# Patient Record
Sex: MALE | Race: WHITE | NOT HISPANIC OR LATINO | Employment: FULL TIME | URBAN - METROPOLITAN AREA
[De-identification: names, ages, dates, MRNs, and addresses within clinical notes are randomized per-mention and may not be internally consistent; named-entity substitution may affect disease eponyms.]

---

## 2017-02-02 ENCOUNTER — OFFICE VISIT (OUTPATIENT)
Dept: FAMILY MEDICINE CLINIC | Facility: CLINIC | Age: 62
End: 2017-02-02

## 2017-02-02 ENCOUNTER — APPOINTMENT (OUTPATIENT)
Dept: LAB | Facility: HOSPITAL | Age: 62
End: 2017-02-02

## 2017-02-02 VITALS
SYSTOLIC BLOOD PRESSURE: 120 MMHG | WEIGHT: 217 LBS | HEIGHT: 72 IN | BODY MASS INDEX: 29.39 KG/M2 | HEART RATE: 73 BPM | TEMPERATURE: 97.5 F | DIASTOLIC BLOOD PRESSURE: 80 MMHG | OXYGEN SATURATION: 95 %

## 2017-02-02 DIAGNOSIS — B35.6 TINEA CRURIS: ICD-10-CM

## 2017-02-02 DIAGNOSIS — Z00.00 ROUTINE GENERAL MEDICAL EXAMINATION AT A HEALTH CARE FACILITY: Primary | ICD-10-CM

## 2017-02-02 DIAGNOSIS — K21.00 GASTROESOPHAGEAL REFLUX DISEASE WITH ESOPHAGITIS: ICD-10-CM

## 2017-02-02 DIAGNOSIS — I10 ESSENTIAL HYPERTENSION: ICD-10-CM

## 2017-02-02 DIAGNOSIS — E78.2 MIXED HYPERLIPIDEMIA: ICD-10-CM

## 2017-02-02 DIAGNOSIS — I10 ESSENTIAL HYPERTENSION, BENIGN: ICD-10-CM

## 2017-02-02 DIAGNOSIS — K58.2 IRRITABLE BOWEL SYNDROME WITH BOTH CONSTIPATION AND DIARRHEA: ICD-10-CM

## 2017-02-02 DIAGNOSIS — M70.21 OLECRANON BURSITIS OF RIGHT ELBOW: ICD-10-CM

## 2017-02-02 LAB
ALBUMIN SERPL-MCNC: 4.7 G/DL (ref 3.2–4.8)
ALBUMIN/GLOB SERPL: 1.6 G/DL (ref 1.5–2.5)
ALP SERPL-CCNC: 123 U/L (ref 25–100)
ALT SERPL W P-5'-P-CCNC: 99 U/L (ref 7–40)
ANION GAP SERPL CALCULATED.3IONS-SCNC: 9 MMOL/L (ref 3–11)
ARTICHOKE IGE QN: 154 MG/DL (ref 0–130)
AST SERPL-CCNC: 55 U/L (ref 0–33)
BASOPHILS # BLD AUTO: 0.06 10*3/MM3 (ref 0–0.2)
BASOPHILS NFR BLD AUTO: 0.8 % (ref 0–1)
BILIRUB SERPL-MCNC: 0.6 MG/DL (ref 0.3–1.2)
BUN BLD-MCNC: 16 MG/DL (ref 9–23)
BUN/CREAT SERPL: 17.8 (ref 7–25)
CALCIUM SPEC-SCNC: 10.7 MG/DL (ref 8.7–10.4)
CHLORIDE SERPL-SCNC: 100 MMOL/L (ref 99–109)
CHOLEST SERPL-MCNC: 239 MG/DL (ref 0–200)
CO2 SERPL-SCNC: 31 MMOL/L (ref 20–31)
CREAT BLD-MCNC: 0.9 MG/DL (ref 0.6–1.3)
DEPRECATED RDW RBC AUTO: 41.9 FL (ref 37–54)
EOSINOPHIL # BLD AUTO: 0.27 10*3/MM3 (ref 0.1–0.3)
EOSINOPHIL NFR BLD AUTO: 3.6 % (ref 0–3)
ERYTHROCYTE [DISTWIDTH] IN BLOOD BY AUTOMATED COUNT: 12.5 % (ref 11.3–14.5)
GFR SERPL CREATININE-BSD FRML MDRD: 85 ML/MIN/1.73
GLOBULIN UR ELPH-MCNC: 3 GM/DL
GLUCOSE BLD-MCNC: 101 MG/DL (ref 70–100)
HCT VFR BLD AUTO: 46.5 % (ref 38.9–50.9)
HCV AB SER DONR QL: NORMAL
HDLC SERPL-MCNC: 43 MG/DL (ref 40–60)
HGB BLD-MCNC: 15.3 G/DL (ref 13.1–17.5)
IMM GRANULOCYTES # BLD: 0.01 10*3/MM3 (ref 0–0.03)
IMM GRANULOCYTES NFR BLD: 0.1 % (ref 0–0.6)
LYMPHOCYTES # BLD AUTO: 1.59 10*3/MM3 (ref 0.6–4.8)
LYMPHOCYTES NFR BLD AUTO: 21.2 % (ref 24–44)
MCH RBC QN AUTO: 30.3 PG (ref 27–31)
MCHC RBC AUTO-ENTMCNC: 32.9 G/DL (ref 32–36)
MCV RBC AUTO: 92.1 FL (ref 80–99)
MONOCYTES # BLD AUTO: 0.66 10*3/MM3 (ref 0–1)
MONOCYTES NFR BLD AUTO: 8.8 % (ref 0–12)
NEUTROPHILS # BLD AUTO: 4.92 10*3/MM3 (ref 1.5–8.3)
NEUTROPHILS NFR BLD AUTO: 65.5 % (ref 41–71)
PLATELET # BLD AUTO: 235 10*3/MM3 (ref 150–450)
PMV BLD AUTO: 10.5 FL (ref 6–12)
POTASSIUM BLD-SCNC: 4.4 MMOL/L (ref 3.5–5.5)
PROT SERPL-MCNC: 7.7 G/DL (ref 5.7–8.2)
PSA SERPL-MCNC: 3.7 NG/ML (ref 0–4)
RBC # BLD AUTO: 5.05 10*6/MM3 (ref 4.2–5.76)
SODIUM BLD-SCNC: 140 MMOL/L (ref 132–146)
T4 SERPL-MCNC: 6.6 MCG/DL (ref 4.7–11.4)
TRIGL SERPL-MCNC: 299 MG/DL (ref 0–150)
TSH SERPL DL<=0.05 MIU/L-ACNC: 0.71 MIU/ML (ref 0.35–5.35)
WBC NRBC COR # BLD: 7.51 10*3/MM3 (ref 3.5–10.8)

## 2017-02-02 PROCEDURE — 84436 ASSAY OF TOTAL THYROXINE: CPT | Performed by: FAMILY MEDICINE

## 2017-02-02 PROCEDURE — 80061 LIPID PANEL: CPT | Performed by: FAMILY MEDICINE

## 2017-02-02 PROCEDURE — 80053 COMPREHEN METABOLIC PANEL: CPT | Performed by: FAMILY MEDICINE

## 2017-02-02 PROCEDURE — G0103 PSA SCREENING: HCPCS | Performed by: FAMILY MEDICINE

## 2017-02-02 PROCEDURE — 99396 PREV VISIT EST AGE 40-64: CPT | Performed by: FAMILY MEDICINE

## 2017-02-02 PROCEDURE — 93000 ELECTROCARDIOGRAM COMPLETE: CPT | Performed by: FAMILY MEDICINE

## 2017-02-02 PROCEDURE — 84443 ASSAY THYROID STIM HORMONE: CPT | Performed by: FAMILY MEDICINE

## 2017-02-02 PROCEDURE — 85025 COMPLETE CBC W/AUTO DIFF WBC: CPT | Performed by: FAMILY MEDICINE

## 2017-02-02 PROCEDURE — 36415 COLL VENOUS BLD VENIPUNCTURE: CPT | Performed by: FAMILY MEDICINE

## 2017-02-02 PROCEDURE — 86803 HEPATITIS C AB TEST: CPT | Performed by: FAMILY MEDICINE

## 2017-02-02 RX ORDER — SUCRALFATE 1 G/1
1 TABLET ORAL 4 TIMES DAILY
COMMUNITY
End: 2017-02-02 | Stop reason: SDUPTHER

## 2017-02-02 RX ORDER — SUCRALFATE 1 G/1
1 TABLET ORAL 4 TIMES DAILY
Qty: 120 TABLET | Refills: 11 | Status: SHIPPED | OUTPATIENT
Start: 2017-02-02 | End: 2018-05-04 | Stop reason: SDUPTHER

## 2017-02-02 RX ORDER — DICYCLOMINE HCL 20 MG
20 TABLET ORAL EVERY 6 HOURS
Qty: 60 TABLET | Refills: 11 | Status: SHIPPED | OUTPATIENT
Start: 2017-02-02 | End: 2018-05-04 | Stop reason: SDUPTHER

## 2017-02-02 RX ORDER — PRAVASTATIN SODIUM 40 MG
40 TABLET ORAL DAILY
Qty: 90 TABLET | Refills: 3 | Status: SHIPPED | OUTPATIENT
Start: 2017-02-02 | End: 2017-02-12

## 2017-02-02 RX ORDER — HYDROCHLOROTHIAZIDE 25 MG/1
25 TABLET ORAL DAILY
COMMUNITY
End: 2017-02-02 | Stop reason: SDUPTHER

## 2017-02-02 RX ORDER — HYDROCHLOROTHIAZIDE 25 MG/1
25 TABLET ORAL DAILY
Qty: 90 TABLET | Refills: 3 | Status: SHIPPED | OUTPATIENT
Start: 2017-02-02 | End: 2018-06-28 | Stop reason: SDUPTHER

## 2017-02-02 RX ORDER — LOSARTAN POTASSIUM 100 MG/1
100 TABLET ORAL DAILY
Qty: 90 TABLET | Refills: 3 | Status: SHIPPED | OUTPATIENT
Start: 2017-02-02 | End: 2018-05-04 | Stop reason: SDUPTHER

## 2017-02-02 RX ORDER — PRAVASTATIN SODIUM 40 MG
1 TABLET ORAL DAILY
COMMUNITY
Start: 2016-10-27 | End: 2017-02-02 | Stop reason: SDUPTHER

## 2017-02-02 RX ORDER — MELOXICAM 15 MG/1
15 TABLET ORAL DAILY
Qty: 30 TABLET | Refills: 1 | Status: SHIPPED | OUTPATIENT
Start: 2017-02-02 | End: 2018-06-28 | Stop reason: SDUPTHER

## 2017-02-02 RX ORDER — CLOTRIMAZOLE AND BETAMETHASONE DIPROPIONATE 10; .64 MG/G; MG/G
CREAM TOPICAL 3 TIMES DAILY
Qty: 45 G | Refills: 2 | Status: SHIPPED | OUTPATIENT
Start: 2017-02-02 | End: 2020-05-14

## 2017-02-02 RX ORDER — DICYCLOMINE HCL 20 MG
20 TABLET ORAL EVERY 6 HOURS
COMMUNITY
End: 2017-02-02 | Stop reason: SDUPTHER

## 2017-02-02 NOTE — PROGRESS NOTES
Conrad Santiago is a 61 y.o. male    HPI Comments: Patient presents today for annual physical examination.  Chronic medical problems include hypertension, hyperlipidemia, reflux esophagitis, irritable bowel syndrome and relatively recent detached retina.  He also had an abscess on his left lower back recently incised and drained.  Subsequent to that he developed rash in his groin that is now spread to his axilla.  It is quite pruritic.  He also complains of pain at the right elbow with swelling.  He has a previous history of olecranon bursitis in this elbow.  He also complains of dysuria and urinary frequency that has been intermittent for the past several days.  He denies any urethral discharge, fever or chills.  From a health maintenance standpoint he has not had a flu vaccine or tetanus booster in unknown period of time and he's never had the shingles vaccine.  His last colonoscopy was in 2010.  He is fasting for his lab studies today.  He needs all of his medicines refilled also.    Rash   Pertinent negatives include no cough or shortness of breath.   Joint Swelling   Associated symptoms include arthralgias, joint swelling and a rash. Pertinent negatives include no chest pain or coughing.   Urinary Tract Infection    Associated symptoms include frequency.       The following portions of the patient's history were reviewed and updated as appropriate: allergies, current medications, past social history and problem list    Review of Systems   Constitutional: Negative.    HENT: Negative.    Eyes: Positive for visual disturbance.   Respiratory: Negative.  Negative for cough, shortness of breath and wheezing.    Cardiovascular: Negative.  Negative for chest pain and palpitations.   Gastrointestinal: Negative.    Endocrine: Negative.    Genitourinary: Positive for dysuria and frequency.   Musculoskeletal: Positive for arthralgias and joint swelling.   Skin: Positive for rash.   Allergic/Immunologic: Negative.     Neurological: Negative.    Hematological: Negative.    Psychiatric/Behavioral: Negative.    All other systems reviewed and are negative.      Objective     Vitals:    02/02/17 0846   BP: 120/80   Pulse: 73   Temp: 97.5 °F (36.4 °C)   SpO2: 95%       Physical Exam   Constitutional: He is oriented to person, place, and time. He appears well-developed and well-nourished.   HENT:   Head: Normocephalic and atraumatic.   Right Ear: External ear normal.   Left Ear: External ear normal.   Nose: Nose normal.   Mouth/Throat: Oropharynx is clear and moist.   Eyes: Conjunctivae and EOM are normal. Pupils are equal, round, and reactive to light.   Neck: Normal range of motion. Neck supple. No thyromegaly present.   Cardiovascular: Normal rate, regular rhythm, normal heart sounds and intact distal pulses.    No murmur heard.  Pulmonary/Chest: Effort normal and breath sounds normal.   Abdominal: Soft. Bowel sounds are normal. He exhibits no mass. There is no tenderness.   Genitourinary: Rectum normal, prostate normal and penis normal.   Musculoskeletal: Normal range of motion. He exhibits no edema.        Right elbow: He exhibits swelling. He exhibits normal range of motion, no effusion, no deformity and no laceration. Tenderness found. Olecranon process tenderness noted.   Tenderness with slight swelling at the olecranon bursa right elbow.   Neurological: He is alert and oriented to person, place, and time. He has normal reflexes. No cranial nerve deficit.   Skin: Skin is warm and dry. Rash noted.   Erythematous rash confluent bilateral groin regions extending onto his scrotum and also noted in both axillae.   Psychiatric: He has a normal mood and affect. His behavior is normal.       ECG 12 Lead  Date/Time: 2/2/2017 9:55 AM  Performed by: JASON SUAZO  Authorized by: JASON SUAZO   Comparison: not compared with previous ECG   Rhythm: sinus rhythm  Rate: normal  Conduction: conduction normal  ST  Segments: ST segments normal  T Waves: T waves normal  QRS axis: normal  Other: no other findings  Clinical impression: normal ECG            Assessment/Plan   Problem List Items Addressed This Visit        Cardiovascular and Mediastinum    Essential hypertension, benign    Relevant Medications    losartan (COZAAR) 100 MG tablet    hydrochlorothiazide (HYDRODIURIL) 25 MG tablet    Other Relevant Orders    Comprehensive Metabolic Panel    TSH    T4    Hyperlipidemia    Relevant Medications    pravastatin (PRAVACHOL) 40 MG tablet    Other Relevant Orders    Lipid Panel       Digestive    Esophageal reflux    Relevant Medications    dicyclomine (BENTYL) 20 MG tablet    sucralfate (CARAFATE) 1 G tablet    Other Relevant Orders    CBC & Differential    CBC Auto Differential    IBS (irritable bowel syndrome)    Relevant Medications    dicyclomine (BENTYL) 20 MG tablet      Other Visit Diagnoses     Routine general medical examination at a health care facility    -  Primary    Relevant Orders    CBC & Differential    Comprehensive Metabolic Panel    Lipid Panel    Hepatitis C Antibody    TSH    T4    PSA Screen    CBC Auto Differential    Tinea cruris        Relevant Medications    clotrimazole-betamethasone (LOTRISONE) 1-0.05 % cream    Olecranon bursitis of right elbow        Relevant Medications    meloxicam (MOBIC) 15 MG tablet    Other Relevant Orders    CBC & Differential    CBC Auto Differential    Essential hypertension        Relevant Medications    losartan (COZAAR) 100 MG tablet    hydrochlorothiazide (HYDRODIURIL) 25 MG tablet        Tdap and flu vaccine today.    Patient is counseled during today's visit regarding preventative health measures to include use of seatbelts, medication safety, healthy diet and regular exercise.

## 2017-02-12 RX ORDER — ROSUVASTATIN CALCIUM 20 MG/1
20 TABLET, COATED ORAL DAILY
Qty: 30 TABLET | Refills: 5 | Status: SHIPPED | OUTPATIENT
Start: 2017-02-12 | End: 2018-05-04 | Stop reason: ALTCHOICE

## 2018-04-27 DIAGNOSIS — I10 ESSENTIAL HYPERTENSION, BENIGN: ICD-10-CM

## 2018-04-27 DIAGNOSIS — I10 ESSENTIAL HYPERTENSION: ICD-10-CM

## 2018-05-04 ENCOUNTER — TELEPHONE (OUTPATIENT)
Dept: FAMILY MEDICINE CLINIC | Facility: CLINIC | Age: 63
End: 2018-05-04

## 2018-05-04 DIAGNOSIS — K58.2 IRRITABLE BOWEL SYNDROME WITH BOTH CONSTIPATION AND DIARRHEA: ICD-10-CM

## 2018-05-04 DIAGNOSIS — I10 ESSENTIAL HYPERTENSION, BENIGN: ICD-10-CM

## 2018-05-04 DIAGNOSIS — K21.00 GASTROESOPHAGEAL REFLUX DISEASE WITH ESOPHAGITIS: ICD-10-CM

## 2018-05-04 DIAGNOSIS — I10 ESSENTIAL HYPERTENSION: ICD-10-CM

## 2018-05-04 RX ORDER — PRAVASTATIN SODIUM 40 MG
40 TABLET ORAL DAILY
Qty: 30 TABLET | Refills: 0 | Status: SHIPPED | OUTPATIENT
Start: 2018-05-04 | End: 2018-06-28 | Stop reason: SDUPTHER

## 2018-05-04 RX ORDER — SUCRALFATE 1 G/1
1 TABLET ORAL 4 TIMES DAILY
Qty: 120 TABLET | Refills: 0 | Status: SHIPPED | OUTPATIENT
Start: 2018-05-04 | End: 2018-06-28 | Stop reason: SDUPTHER

## 2018-05-04 RX ORDER — DICYCLOMINE HCL 20 MG
20 TABLET ORAL EVERY 6 HOURS
Qty: 60 TABLET | Refills: 0 | Status: SHIPPED | OUTPATIENT
Start: 2018-05-04 | End: 2018-06-28 | Stop reason: SDUPTHER

## 2018-05-04 RX ORDER — LOSARTAN POTASSIUM 100 MG/1
100 TABLET ORAL DAILY
Qty: 30 TABLET | Refills: 0 | Status: SHIPPED | OUTPATIENT
Start: 2018-05-04 | End: 2018-06-28 | Stop reason: SDUPTHER

## 2018-05-04 NOTE — TELEPHONE ENCOUNTER
----- Message from Chava Santiago sent at 4/19/2018 11:59 AM EDT -----  Regarding: Prescription Question  Contact: [phone]  Dr. Bangura,       The Pharmacy is saying that I need all of my prescriptions redone (Carafate, Losartan, Dycyclomine and Prevastatin) .   please make 30 day intervals. Thank you    Chava Santiago

## 2018-05-04 NOTE — TELEPHONE ENCOUNTER
----- Message from Chava Santiago sent at 4/19/2018 11:59 AM EDT -----  Regarding: Prescription Question  Contact: [phone]  Dr. Bangura,       The Pharmacy is saying that I need all of my prescriptions redone (Carafate, Losartan, Dycyclomine and Prevastatin) .   please make 30 day intervals. Thank you    Chava Santiago

## 2018-05-04 NOTE — TELEPHONE ENCOUNTER
----- Message from Mis Mcfarland sent at 5/4/2018  8:27 AM EDT -----  Contact: PTS WIFE, CHEN LAW  PTS WIFE SAID THERE HAS BEEN NO RESPONSE TO REFILL REQUEST FROM [facility]    LOSARTAN, PREVASTATIN, DICYCLOMINE, KARAFATE

## 2018-06-28 DIAGNOSIS — M70.21 OLECRANON BURSITIS OF RIGHT ELBOW: ICD-10-CM

## 2018-06-28 DIAGNOSIS — K58.2 IRRITABLE BOWEL SYNDROME WITH BOTH CONSTIPATION AND DIARRHEA: ICD-10-CM

## 2018-06-28 DIAGNOSIS — K21.00 GASTROESOPHAGEAL REFLUX DISEASE WITH ESOPHAGITIS: ICD-10-CM

## 2018-06-28 DIAGNOSIS — I10 ESSENTIAL HYPERTENSION: ICD-10-CM

## 2018-06-28 DIAGNOSIS — I10 ESSENTIAL HYPERTENSION, BENIGN: ICD-10-CM

## 2018-06-28 RX ORDER — LOSARTAN POTASSIUM 100 MG/1
100 TABLET ORAL DAILY
Qty: 30 TABLET | Refills: 2 | Status: SHIPPED | OUTPATIENT
Start: 2018-06-28 | End: 2018-11-05 | Stop reason: SDUPTHER

## 2018-06-28 RX ORDER — SUCRALFATE 1 G/1
1 TABLET ORAL 4 TIMES DAILY
Qty: 120 TABLET | Refills: 2 | Status: SHIPPED | OUTPATIENT
Start: 2018-06-28 | End: 2018-12-04

## 2018-06-28 RX ORDER — HYDROCHLOROTHIAZIDE 25 MG/1
25 TABLET ORAL DAILY
Qty: 30 TABLET | Refills: 2 | Status: SHIPPED | OUTPATIENT
Start: 2018-06-28 | End: 2018-12-04 | Stop reason: SDUPTHER

## 2018-06-28 RX ORDER — PRAVASTATIN SODIUM 40 MG
40 TABLET ORAL DAILY
Qty: 30 TABLET | Refills: 2 | Status: SHIPPED | OUTPATIENT
Start: 2018-06-28 | End: 2018-11-05 | Stop reason: SDUPTHER

## 2018-06-28 RX ORDER — DICYCLOMINE HCL 20 MG
20 TABLET ORAL EVERY 6 HOURS
Qty: 60 TABLET | Refills: 0 | Status: SHIPPED | OUTPATIENT
Start: 2018-06-28 | End: 2018-10-24 | Stop reason: SDUPTHER

## 2018-06-28 RX ORDER — MELOXICAM 15 MG/1
15 TABLET ORAL DAILY
Qty: 30 TABLET | Refills: 2 | Status: SHIPPED | OUTPATIENT
Start: 2018-06-28 | End: 2018-12-04 | Stop reason: SDUPTHER

## 2018-07-26 ENCOUNTER — LAB (OUTPATIENT)
Dept: LAB | Facility: HOSPITAL | Age: 63
End: 2018-07-26

## 2018-07-26 ENCOUNTER — HOSPITAL ENCOUNTER (OUTPATIENT)
Dept: GENERAL RADIOLOGY | Facility: HOSPITAL | Age: 63
Discharge: HOME | End: 2018-07-26
Admitting: FAMILY MEDICINE

## 2018-07-26 ENCOUNTER — OFFICE VISIT (OUTPATIENT)
Dept: FAMILY MEDICINE CLINIC | Facility: CLINIC | Age: 63
End: 2018-07-26

## 2018-07-26 VITALS
HEART RATE: 52 BPM | TEMPERATURE: 97.4 F | WEIGHT: 210 LBS | OXYGEN SATURATION: 98 % | DIASTOLIC BLOOD PRESSURE: 78 MMHG | HEIGHT: 72 IN | SYSTOLIC BLOOD PRESSURE: 122 MMHG | BODY MASS INDEX: 28.44 KG/M2

## 2018-07-26 DIAGNOSIS — M25.562 ACUTE PAIN OF LEFT KNEE: ICD-10-CM

## 2018-07-26 DIAGNOSIS — I10 ESSENTIAL HYPERTENSION, BENIGN: ICD-10-CM

## 2018-07-26 DIAGNOSIS — E78.2 MIXED HYPERLIPIDEMIA: ICD-10-CM

## 2018-07-26 DIAGNOSIS — Z00.00 ROUTINE GENERAL MEDICAL EXAMINATION AT A HEALTH CARE FACILITY: Primary | ICD-10-CM

## 2018-07-26 DIAGNOSIS — Z00.00 ROUTINE GENERAL MEDICAL EXAMINATION AT A HEALTH CARE FACILITY: ICD-10-CM

## 2018-07-26 DIAGNOSIS — Z12.5 PROSTATE CANCER SCREENING: ICD-10-CM

## 2018-07-26 DIAGNOSIS — R00.1 SINUS BRADYCARDIA: ICD-10-CM

## 2018-07-26 LAB
ALBUMIN SERPL-MCNC: 4.31 G/DL (ref 3.2–4.8)
ALBUMIN/GLOB SERPL: 1.7 G/DL (ref 1.5–2.5)
ALP SERPL-CCNC: 97 U/L (ref 25–100)
ALT SERPL W P-5'-P-CCNC: 28 U/L (ref 7–40)
ANION GAP SERPL CALCULATED.3IONS-SCNC: 7 MMOL/L (ref 3–11)
ARTICHOKE IGE QN: 138 MG/DL (ref 0–130)
AST SERPL-CCNC: 22 U/L (ref 0–33)
BILIRUB SERPL-MCNC: 0.6 MG/DL (ref 0.3–1.2)
BUN BLD-MCNC: 20 MG/DL (ref 9–23)
BUN/CREAT SERPL: 21.3 (ref 7–25)
CALCIUM SPEC-SCNC: 9.8 MG/DL (ref 8.7–10.4)
CHLORIDE SERPL-SCNC: 105 MMOL/L (ref 99–109)
CHOLEST SERPL-MCNC: 193 MG/DL (ref 0–200)
CO2 SERPL-SCNC: 30 MMOL/L (ref 20–31)
CREAT BLD-MCNC: 0.94 MG/DL (ref 0.6–1.3)
DEPRECATED RDW RBC AUTO: 42.7 FL (ref 37–54)
ERYTHROCYTE [DISTWIDTH] IN BLOOD BY AUTOMATED COUNT: 13 % (ref 11.3–14.5)
GFR SERPL CREATININE-BSD FRML MDRD: 81 ML/MIN/1.73
GLOBULIN UR ELPH-MCNC: 2.5 GM/DL
GLUCOSE BLD-MCNC: 99 MG/DL (ref 70–100)
HCT VFR BLD AUTO: 45.8 % (ref 38.9–50.9)
HDLC SERPL-MCNC: 42 MG/DL (ref 40–60)
HGB BLD-MCNC: 15.1 G/DL (ref 13.1–17.5)
MCH RBC QN AUTO: 29.8 PG (ref 27–31)
MCHC RBC AUTO-ENTMCNC: 33 G/DL (ref 32–36)
MCV RBC AUTO: 90.5 FL (ref 80–99)
PLATELET # BLD AUTO: 246 10*3/MM3 (ref 150–450)
PMV BLD AUTO: 10.6 FL (ref 6–12)
POTASSIUM BLD-SCNC: 4.8 MMOL/L (ref 3.5–5.5)
PROT SERPL-MCNC: 6.8 G/DL (ref 5.7–8.2)
PSA SERPL-MCNC: 1.54 NG/ML (ref 0–4)
RBC # BLD AUTO: 5.06 10*6/MM3 (ref 4.2–5.76)
SODIUM BLD-SCNC: 142 MMOL/L (ref 132–146)
T4 FREE SERPL-MCNC: 1.03 NG/DL (ref 0.89–1.76)
TRIGL SERPL-MCNC: 219 MG/DL (ref 0–150)
TSH SERPL DL<=0.05 MIU/L-ACNC: 1.03 MIU/ML (ref 0.35–5.35)
WBC NRBC COR # BLD: 5.64 10*3/MM3 (ref 3.5–10.8)

## 2018-07-26 PROCEDURE — 80061 LIPID PANEL: CPT

## 2018-07-26 PROCEDURE — 73560 X-RAY EXAM OF KNEE 1 OR 2: CPT

## 2018-07-26 PROCEDURE — 80053 COMPREHEN METABOLIC PANEL: CPT

## 2018-07-26 PROCEDURE — G0103 PSA SCREENING: HCPCS

## 2018-07-26 PROCEDURE — 93000 ELECTROCARDIOGRAM COMPLETE: CPT | Performed by: FAMILY MEDICINE

## 2018-07-26 PROCEDURE — 36415 COLL VENOUS BLD VENIPUNCTURE: CPT

## 2018-07-26 PROCEDURE — 85027 COMPLETE CBC AUTOMATED: CPT

## 2018-07-26 PROCEDURE — 84439 ASSAY OF FREE THYROXINE: CPT

## 2018-07-26 PROCEDURE — 84443 ASSAY THYROID STIM HORMONE: CPT

## 2018-07-26 PROCEDURE — 99396 PREV VISIT EST AGE 40-64: CPT | Performed by: FAMILY MEDICINE

## 2018-07-26 NOTE — PROGRESS NOTES
Conrad Santiago is a 62 y.o. male    Patient presents today for annual physical examination.  Is been 18 months since he has had a wellness visit.  Health maintenance issues are addressed and updated.  Chronic medical problems include hypertension, hyperlipidemia, GERD and irritable bowel syndrome.  His only acute complaint is that of left knee pain that started approximately 1 month ago.  There was no specific injury but he developed some swelling and pain medially.  The swelling has subsided but he continues to have discomfort and also reports occasional locking of the knee occurs.  He does not feel any catching and there is been no giving way sensation.  No prior history of knee problems.        The following portions of the patient's history were reviewed and updated as appropriate: allergies, current medications, past social history and problem list    Review of Systems   Constitutional: Negative.  Negative for chills, fatigue, fever and unexpected weight change.   HENT: Negative.    Eyes: Negative.    Respiratory: Negative.  Negative for cough, chest tightness, shortness of breath and wheezing.    Cardiovascular: Negative.  Negative for chest pain, palpitations and leg swelling.   Gastrointestinal: Negative.  Negative for abdominal pain, nausea and vomiting.   Endocrine: Negative.  Negative for polydipsia, polyphagia and polyuria.   Genitourinary: Negative.  Negative for dysuria, frequency and urgency.   Musculoskeletal: Negative.  Negative for arthralgias, back pain and myalgias.   Skin: Negative.  Negative for color change and rash.   Allergic/Immunologic: Negative.    Neurological: Negative.  Negative for dizziness, syncope, weakness and headaches.   Hematological: Negative.  Negative for adenopathy. Does not bruise/bleed easily.   Psychiatric/Behavioral: Negative.    All other systems reviewed and are negative.      Objective     Vitals:    07/26/18 0852   BP: 122/78   Pulse: 52   Temp: 97.4 °F  (36.3 °C)   SpO2: 98%       Physical Exam   Constitutional: He is oriented to person, place, and time. He appears well-developed and well-nourished.   HENT:   Head: Normocephalic and atraumatic.   Right Ear: External ear normal.   Left Ear: External ear normal.   Nose: Nose normal.   Mouth/Throat: Oropharynx is clear and moist.   Eyes: Pupils are equal, round, and reactive to light. Conjunctivae and EOM are normal.   Neck: Normal range of motion. Neck supple. No JVD present. No thyromegaly present.   Cardiovascular: Normal rate, regular rhythm, normal heart sounds, intact distal pulses and normal pulses.    No murmur heard.  Pulmonary/Chest: Effort normal and breath sounds normal. No respiratory distress.   Abdominal: Soft. Bowel sounds are normal. He exhibits no mass. There is no hepatosplenomegaly. There is no tenderness.   Genitourinary: Rectum normal, prostate normal and penis normal.   Musculoskeletal: Normal range of motion. He exhibits no edema.   Lymphadenopathy:     He has no cervical adenopathy.   Neurological: He is alert and oriented to person, place, and time. He has normal reflexes. No cranial nerve deficit.   Skin: Skin is warm and dry. No rash noted.   Psychiatric: He has a normal mood and affect. His behavior is normal.   Nursing note and vitals reviewed.      ECG 12 Lead  Date/Time: 7/26/2018 10:30 AM  Performed by: JASON SUAZO  Authorized by: JASON SUAZO   Comparison: not compared with previous ECG   Rhythm: sinus bradycardia  Rate: bradycardic  ST Segments: ST segments normal  T Waves: T waves normal  QRS axis: normal  Other: no other findings  Clinical impression: normal ECG  Comments: Sinus bradycardia          Assessment/Plan   Problem List Items Addressed This Visit        Cardiovascular and Mediastinum    Essential hypertension, benign    Relevant Orders    Comprehensive Metabolic Panel    TSH    T4, Free    Hyperlipidemia    Relevant Orders    Comprehensive  Metabolic Panel    Lipid Panel      Other Visit Diagnoses     Routine general medical examination at a health care facility    -  Primary    Relevant Orders    CBC (No Diff)    Comprehensive Metabolic Panel    Lipid Panel    Acute pain of left knee        Relevant Orders    XR Knee 1 or 2 View Left    Ambulatory Referral to Orthopedic Surgery    Prostate cancer screening        Relevant Orders    PSA Screen        Patient is counseled during today's visit regarding preventative health measures to include use of seatbelts, medication safety, healthy diet and regular exercise.

## 2018-08-08 ENCOUNTER — OFFICE VISIT (OUTPATIENT)
Dept: ORTHOPEDIC SURGERY | Facility: CLINIC | Age: 63
End: 2018-08-08

## 2018-08-08 VITALS — HEIGHT: 69 IN | BODY MASS INDEX: 30.99 KG/M2 | WEIGHT: 209.22 LBS | OXYGEN SATURATION: 97 % | HEART RATE: 62 BPM

## 2018-08-08 DIAGNOSIS — M17.12 ARTHRITIS OF LEFT KNEE: Primary | ICD-10-CM

## 2018-08-08 DIAGNOSIS — M17.12 PRIMARY OSTEOARTHRITIS OF LEFT KNEE: ICD-10-CM

## 2018-08-08 PROCEDURE — 99204 OFFICE O/P NEW MOD 45 MIN: CPT | Performed by: ORTHOPAEDIC SURGERY

## 2018-08-08 PROCEDURE — 20610 DRAIN/INJ JOINT/BURSA W/O US: CPT | Performed by: ORTHOPAEDIC SURGERY

## 2018-08-08 RX ORDER — BETAMETHASONE SODIUM PHOSPHATE AND BETAMETHASONE ACETATE 3; 3 MG/ML; MG/ML
6 INJECTION, SUSPENSION INTRA-ARTICULAR; INTRALESIONAL; INTRAMUSCULAR; SOFT TISSUE
Status: COMPLETED | OUTPATIENT
Start: 2018-08-08 | End: 2018-08-08

## 2018-08-08 RX ORDER — BUPIVACAINE HYDROCHLORIDE 2.5 MG/ML
4 INJECTION, SOLUTION INFILTRATION; PERINEURAL
Status: COMPLETED | OUTPATIENT
Start: 2018-08-08 | End: 2018-08-08

## 2018-08-08 RX ORDER — LIDOCAINE HYDROCHLORIDE 10 MG/ML
4 INJECTION, SOLUTION INFILTRATION; PERINEURAL
Status: COMPLETED | OUTPATIENT
Start: 2018-08-08 | End: 2018-08-08

## 2018-08-08 RX ADMIN — LIDOCAINE HYDROCHLORIDE 4 ML: 10 INJECTION, SOLUTION INFILTRATION; PERINEURAL at 09:46

## 2018-08-08 RX ADMIN — BETAMETHASONE SODIUM PHOSPHATE AND BETAMETHASONE ACETATE 6 MG: 3; 3 INJECTION, SUSPENSION INTRA-ARTICULAR; INTRALESIONAL; INTRAMUSCULAR; SOFT TISSUE at 09:46

## 2018-08-08 RX ADMIN — BUPIVACAINE HYDROCHLORIDE 4 ML: 2.5 INJECTION, SOLUTION INFILTRATION; PERINEURAL at 09:46

## 2018-08-08 NOTE — PROGRESS NOTES
Procedure   Large Joint Arthrocentesis  Date/Time: 8/8/2018 9:46 AM  Consent given by: patient  Site marked: site marked  Timeout: Immediately prior to procedure a time out was called to verify the correct patient, procedure, equipment, support staff and site/side marked as required   Supporting Documentation  Indications: pain   Procedure Details  Location: knee - L knee  Preparation: Patient was prepped and draped in the usual sterile fashion  Needle size: 22 G  Approach: anterolateral  Medications administered: 4 mL bupivacaine 0.25 %; 4 mL lidocaine 1 %; 6 mg betamethasone acetate-betamethasone sodium phosphate 6 (3-3) MG/ML  Patient tolerance: patient tolerated the procedure well with no immediate complications

## 2018-08-29 ENCOUNTER — OFFICE VISIT (OUTPATIENT)
Dept: ORTHOPEDIC SURGERY | Facility: CLINIC | Age: 63
End: 2018-08-29

## 2018-08-29 VITALS — WEIGHT: 209.22 LBS | HEIGHT: 69 IN | OXYGEN SATURATION: 98 % | HEART RATE: 63 BPM | BODY MASS INDEX: 30.99 KG/M2

## 2018-08-29 DIAGNOSIS — M17.12 PRIMARY OSTEOARTHRITIS OF LEFT KNEE: Primary | ICD-10-CM

## 2018-08-29 PROCEDURE — 99212 OFFICE O/P EST SF 10 MIN: CPT | Performed by: ORTHOPAEDIC SURGERY

## 2018-08-29 NOTE — PROGRESS NOTES
[facility] [place] [facility] Note    Subjective     Chief Complaint   Patient presents with   • Follow-up     3 week follow up -- Arthritis of left knee - injection given 08/08/18        HPI      Chava HERNANDES Law is a 62 y.o. male.  He is doing better with the left knee.  The cortisone shot helped tremendously.  He takes Mobic.  His pain is 2 out of 10 and aching at times.        Past Medical History:   Diagnosis Date   • Acute bronchitis    • Acute pharyngitis    • Conjunctivitis    • Hyperlipidemia    • Hypertension    • IBS (irritable bowel syndrome)    • Lumbago    • Rhinitis       Past Surgical History:   Procedure Laterality Date   • EYE SURGERY Bilateral     multiple on both eyes   • HERNIA REPAIR Right     x2   • KNEE SURGERY Left     arthroscopy with meniscal repair- 10 years ago      Family History   Problem Relation Age of Onset   • Adopted: Yes     Social History     Social History   • Marital status:      Spouse name: N/A   • Number of children: N/A   • Years of education: N/A     Occupational History   • Not on file.     Social History Main Topics   • Smoking status: Former Smoker   • Smokeless tobacco: Never Used   • Alcohol use Yes      Comment: occ   • Drug use: No   • Sexual activity: Not on file     Other Topics Concern   • Not on file     Social History Narrative   • No narrative on file      Current Outpatient Prescriptions on File Prior to Visit   Medication Sig Dispense Refill   • clotrimazole-betamethasone (LOTRISONE) 1-0.05 % cream Apply  topically 3 (Three) Times a Day. 45 g 2   • dicyclomine (BENTYL) 20 MG tablet Take 1 tablet by mouth Every 6 (Six) Hours. 60 tablet 0   • hydrochlorothiazide (HYDRODIURIL) 25 MG tablet Take 1 tablet by mouth Daily. 30 tablet 2   • losartan (COZAAR) 100 MG tablet Take 1 tablet by mouth Daily. 30 tablet 2   • meloxicam (MOBIC) 15 MG tablet Take 1 tablet by mouth Daily. 30 tablet 2   • pravastatin (PRAVACHOL) 40 MG tablet Take 1 tablet by mouth Daily.  "30 tablet 2   • sucralfate (CARAFATE) 1 g tablet Take 1 tablet by mouth 4 (Four) Times a Day. 120 tablet 2     No current facility-administered medications on file prior to visit.       No Known Allergies     The following portions of the patient's history were reviewed and updated as appropriate: allergies, current medications, past family history, past medical history, past social history, past surgical history and problem list.    Review of Systems   Constitutional: Negative.    HENT: Negative.    Eyes: Negative.    Respiratory: Negative.    Cardiovascular: Negative.    Gastrointestinal: Negative.    Endocrine: Negative.    Genitourinary: Negative.    Musculoskeletal: Positive for arthralgias and joint swelling.   Skin: Negative.    Allergic/Immunologic: Negative.    Neurological: Negative.    Hematological: Negative.    Psychiatric/Behavioral: Negative.         Objective      Physical Exam  Pulse 63   Ht 175.3 cm (69.02\")   Wt 94.9 kg (209 lb 3.5 oz)   SpO2 98%   BMI 30.88 kg/m²     Body mass index is 30.88 kg/m².        GENERAL APPEARANCE: awake, alert & oriented x 3, in no acute distress and well developed, well nourished  PSYCH: normal mood and affect    Ortho Exam  Peripheral Vascular:    Upper Extremity:   Inspection:  Left--no cyanotic nail beds Right--no cyanotic nail beds   Bilateral:  Pink nail beds with brisk capillary refill   Palpation:  Bilateral radial pulse normal    Musculoskeletal:  Global Assessment:  Overall assessment of Lower Extremity Muscle Strength and Tone:  Left quadriceps--5/5   Left hamstrings--5/5       Left tibialis anterior--5/5  Left gastroc-soleus--5/5  Left EHL --5/5    Lower Extremity:  Knee/Patella:  No digital clubbing or cyanosis.    Examination of left knee reveals:  Normal deep tendon reflexes, coordination, strength, tone, sensation.  No known fractures or deformities.    Inspection and Palpation:  Left knee:  Tenderness:  Over the medial joint line and moderate " severity  Effusion:  none  Crepitus:  Positive  Pulses:  2+  Ecchymosis:  None  Warmth:  None     ROM:  Right:  Extension:5    Flexion:120  Left:  Extension:5     Flexion:120    Instability:    Left:  Lachman Test:  Negative, Varus stress test negative, Valgus stress test negative    Deformities/Malalignments/Discrepancies:    Left:  Genu Varum   Right:  No deformity    Functional Testing:  Jacquelyn's test:  Negative  Patella grind test:  Positive  Q-angle:  normal    Imaging/Studies  Imaging Results (last 7 days)     ** No results found for the last 168 hours. **          Assessment/Plan        ICD-10-CM ICD-9-CM   1. Primary osteoarthritis of left knee M17.12 715.16     He is doing better after the cortisone injection and will follow-up as needed.  Medical Decision Making  Management Options : over-the-counter medicine      Raúl Bhatti MD  08/29/18  9:19 AM         EMR Dragon/Transcription disclaimer:  Much of this encounter note is an electronic transcription of spoken language to printed text. Electronic transcription of spoken language may permit erroneous, or at times, nonsensical words or phrases to be inadvertently transcribed. Although I have reviewed the note for such errors, some may still exist.

## 2018-10-24 DIAGNOSIS — K58.2 IRRITABLE BOWEL SYNDROME WITH BOTH CONSTIPATION AND DIARRHEA: ICD-10-CM

## 2018-11-05 DIAGNOSIS — I10 ESSENTIAL HYPERTENSION, BENIGN: ICD-10-CM

## 2018-11-05 DIAGNOSIS — I10 ESSENTIAL HYPERTENSION: ICD-10-CM

## 2018-11-05 RX ORDER — PRAVASTATIN SODIUM 40 MG
TABLET ORAL
Qty: 30 TABLET | Refills: 3 | Status: SHIPPED | OUTPATIENT
Start: 2018-11-05 | End: 2018-12-04 | Stop reason: SDUPTHER

## 2018-11-05 RX ORDER — DICYCLOMINE HCL 20 MG
TABLET ORAL
Qty: 60 TABLET | Refills: 0 | Status: SHIPPED | OUTPATIENT
Start: 2018-11-05 | End: 2018-12-04 | Stop reason: SDUPTHER

## 2018-11-05 RX ORDER — LOSARTAN POTASSIUM 100 MG/1
TABLET ORAL
Qty: 30 TABLET | Refills: 3 | Status: SHIPPED | OUTPATIENT
Start: 2018-11-05 | End: 2018-12-04 | Stop reason: SDUPTHER

## 2018-12-04 ENCOUNTER — OFFICE VISIT (OUTPATIENT)
Dept: FAMILY MEDICINE CLINIC | Facility: CLINIC | Age: 63
End: 2018-12-04

## 2018-12-04 VITALS
WEIGHT: 213 LBS | SYSTOLIC BLOOD PRESSURE: 140 MMHG | HEART RATE: 55 BPM | TEMPERATURE: 98.6 F | OXYGEN SATURATION: 98 % | HEIGHT: 69 IN | DIASTOLIC BLOOD PRESSURE: 82 MMHG | BODY MASS INDEX: 31.55 KG/M2

## 2018-12-04 DIAGNOSIS — M70.21 OLECRANON BURSITIS OF RIGHT ELBOW: ICD-10-CM

## 2018-12-04 DIAGNOSIS — K21.00 GASTROESOPHAGEAL REFLUX DISEASE WITH ESOPHAGITIS: ICD-10-CM

## 2018-12-04 DIAGNOSIS — E78.2 MIXED HYPERLIPIDEMIA: ICD-10-CM

## 2018-12-04 DIAGNOSIS — N39.0 URINARY TRACT INFECTION WITH HEMATURIA, SITE UNSPECIFIED: ICD-10-CM

## 2018-12-04 DIAGNOSIS — Z87.19 HISTORY OF ESOPHAGEAL STRICTURE: ICD-10-CM

## 2018-12-04 DIAGNOSIS — I10 ESSENTIAL HYPERTENSION, BENIGN: ICD-10-CM

## 2018-12-04 DIAGNOSIS — K58.2 IRRITABLE BOWEL SYNDROME WITH BOTH CONSTIPATION AND DIARRHEA: ICD-10-CM

## 2018-12-04 DIAGNOSIS — R31.9 URINARY TRACT INFECTION WITH HEMATURIA, SITE UNSPECIFIED: ICD-10-CM

## 2018-12-04 DIAGNOSIS — R13.10 DYSPHAGIA, UNSPECIFIED TYPE: Primary | ICD-10-CM

## 2018-12-04 LAB
BILIRUB BLD-MCNC: NEGATIVE MG/DL
CLARITY, POC: ABNORMAL
COLOR UR: ABNORMAL
GLUCOSE UR STRIP-MCNC: NEGATIVE MG/DL
KETONES UR QL: NEGATIVE
LEUKOCYTE EST, POC: ABNORMAL
NITRITE UR-MCNC: POSITIVE MG/ML
PH UR: 6 [PH] (ref 5–8)
PROT UR STRIP-MCNC: ABNORMAL MG/DL
RBC # UR STRIP: ABNORMAL /UL
SP GR UR: 1.02 (ref 1–1.03)
UROBILINOGEN UR QL: NORMAL

## 2018-12-04 PROCEDURE — 81003 URINALYSIS AUTO W/O SCOPE: CPT | Performed by: FAMILY MEDICINE

## 2018-12-04 PROCEDURE — 99214 OFFICE O/P EST MOD 30 MIN: CPT | Performed by: FAMILY MEDICINE

## 2018-12-04 RX ORDER — HYDROCHLOROTHIAZIDE 25 MG/1
25 TABLET ORAL DAILY
Qty: 90 TABLET | Refills: 3 | Status: SHIPPED | OUTPATIENT
Start: 2018-12-04 | End: 2020-01-20 | Stop reason: SDUPTHER

## 2018-12-04 RX ORDER — LOSARTAN POTASSIUM 100 MG/1
100 TABLET ORAL DAILY
Qty: 90 TABLET | Refills: 3 | Status: SHIPPED | OUTPATIENT
Start: 2018-12-04 | End: 2020-01-20

## 2018-12-04 RX ORDER — CIPROFLOXACIN 500 MG/1
500 TABLET, FILM COATED ORAL 2 TIMES DAILY
Qty: 20 TABLET | Refills: 0 | Status: SHIPPED | OUTPATIENT
Start: 2018-12-04 | End: 2018-12-27 | Stop reason: SDUPTHER

## 2018-12-04 RX ORDER — MELOXICAM 15 MG/1
15 TABLET ORAL DAILY
Qty: 90 TABLET | Refills: 3 | Status: SHIPPED | OUTPATIENT
Start: 2018-12-04 | End: 2020-01-20 | Stop reason: SDUPTHER

## 2018-12-04 RX ORDER — PRAVASTATIN SODIUM 40 MG
40 TABLET ORAL DAILY
Qty: 90 TABLET | Refills: 3 | Status: SHIPPED | OUTPATIENT
Start: 2018-12-04 | End: 2020-01-20 | Stop reason: SDUPTHER

## 2018-12-04 RX ORDER — DICYCLOMINE HCL 20 MG
TABLET ORAL
Qty: 60 TABLET | Refills: 11 | Status: SHIPPED | OUTPATIENT
Start: 2018-12-04 | End: 2020-01-20 | Stop reason: SDUPTHER

## 2018-12-04 RX ORDER — SUCRALFATE ORAL 1 G/10ML
1 SUSPENSION ORAL 4 TIMES DAILY
Qty: 420 ML | Refills: 11 | Status: SHIPPED | OUTPATIENT
Start: 2018-12-04 | End: 2020-05-14 | Stop reason: SDUPTHER

## 2018-12-04 NOTE — PROGRESS NOTES
Conrad Santiago is a 63 y.o. male    Chief Complaint    Dysphagia  Esophageal reflux  History of esophageal stricture  Hypertension  Malodorous urine      History of Present Illness   Patient presents today with multiple complaints.  Primary complaint is difficulty swallowing.  Has a history of esophageal reflux and previous esophageal stricture requiring dilatation.  He reports he is having trouble swallowing pills and swallowing solid food.  He has been taking Carafate tablets but would like the liquid.  Also follow-up regarding hypertension.  Needs multiple refills.  Also complains today of malodorous urine but denies dysuria or hematuria.  No history of kidney stones, urinary tract infection or prostatitis.  Office urinalysis is positive for leukocytes, blood and nitrite positive.      The following portions of the patient's history were reviewed and updated as appropriate: allergies, current medications, past social history and problem list    Review of Systems   Constitutional: Negative for chills, fatigue, fever and unexpected weight change.   HENT: Positive for trouble swallowing. Negative for voice change.    Respiratory: Negative for cough, chest tightness, shortness of breath and wheezing.    Cardiovascular: Negative for chest pain, palpitations and leg swelling.   Gastrointestinal: Negative for abdominal pain, nausea and vomiting.   Endocrine: Negative for polydipsia, polyphagia and polyuria.   Genitourinary: Positive for difficulty urinating. Negative for dysuria, frequency, hematuria and urgency.   Musculoskeletal: Positive for arthralgias and myalgias. Negative for back pain.   Skin: Negative for color change and rash.   Neurological: Negative for dizziness, syncope, weakness and headaches.   Hematological: Negative for adenopathy. Does not bruise/bleed easily.   Psychiatric/Behavioral: Negative.        Objective     Vitals:    12/04/18 1325   BP: 140/82   Pulse: 55   Temp: 98.6 °F (37 °C)    SpO2: 98%       Physical Exam   Constitutional: He is oriented to person, place, and time. He appears well-developed and well-nourished.   HENT:   Head: Normocephalic.   Mouth/Throat: Oropharynx is clear and moist.   Eyes: Conjunctivae are normal. Pupils are equal, round, and reactive to light.   Neck: Neck supple. No JVD present. No thyromegaly present.   Cardiovascular: Normal rate, regular rhythm, normal heart sounds, intact distal pulses and normal pulses.   No murmur heard.  Pulmonary/Chest: Effort normal and breath sounds normal. No respiratory distress.   Abdominal: Soft. Bowel sounds are normal. He exhibits no mass. There is no hepatosplenomegaly. There is tenderness. There is no rebound and no guarding.   Musculoskeletal: He exhibits no edema or tenderness.   Lymphadenopathy:     He has no cervical adenopathy.   Neurological: He is alert and oriented to person, place, and time.   Skin: Skin is warm and dry. No rash noted.   Psychiatric: He has a normal mood and affect. His behavior is normal.   Nursing note and vitals reviewed.      Assessment/Plan   Problem List Items Addressed This Visit        Cardiovascular and Mediastinum    Essential hypertension, benign    Relevant Medications    hydrochlorothiazide (HYDRODIURIL) 25 MG tablet    losartan (COZAAR) 100 MG tablet    Hyperlipidemia    Relevant Medications    pravastatin (PRAVACHOL) 40 MG tablet       Digestive    Esophageal reflux    Relevant Medications    dicyclomine (BENTYL) 20 MG tablet    sucralfate (CARAFATE) 1 GM/10ML suspension    Other Relevant Orders    Ambulatory Referral to Gastroenterology    IBS (irritable bowel syndrome)    Relevant Medications    dicyclomine (BENTYL) 20 MG tablet      Other Visit Diagnoses     Dysphagia, unspecified type    -  Primary    Relevant Orders    Ambulatory Referral to Gastroenterology    History of esophageal stricture        Relevant Medications    sucralfate (CARAFATE) 1 GM/10ML suspension    Other Relevant  Orders    Ambulatory Referral to Gastroenterology    Olecranon bursitis of right elbow        Relevant Medications    meloxicam (MOBIC) 15 MG tablet    Urinary tract infection with hematuria, site unspecified        Relevant Medications    ciprofloxacin (CIPRO) 500 MG tablet    Other Relevant Orders    POC Urinalysis Dipstick, Automated (Completed)

## 2018-12-05 ENCOUNTER — OFFICE VISIT (OUTPATIENT)
Dept: ORTHOPEDIC SURGERY | Facility: CLINIC | Age: 63
End: 2018-12-05

## 2018-12-05 VITALS — HEIGHT: 69 IN | BODY MASS INDEX: 31.35 KG/M2 | WEIGHT: 211.64 LBS | HEART RATE: 65 BPM | OXYGEN SATURATION: 98 %

## 2018-12-05 DIAGNOSIS — M17.12 PRIMARY OSTEOARTHRITIS OF LEFT KNEE: Primary | ICD-10-CM

## 2018-12-05 PROCEDURE — 20610 DRAIN/INJ JOINT/BURSA W/O US: CPT | Performed by: ORTHOPAEDIC SURGERY

## 2018-12-05 RX ORDER — BETAMETHASONE SODIUM PHOSPHATE AND BETAMETHASONE ACETATE 3; 3 MG/ML; MG/ML
6 INJECTION, SUSPENSION INTRA-ARTICULAR; INTRALESIONAL; INTRAMUSCULAR; SOFT TISSUE
Status: COMPLETED | OUTPATIENT
Start: 2018-12-05 | End: 2018-12-05

## 2018-12-05 RX ORDER — BUPIVACAINE HYDROCHLORIDE 2.5 MG/ML
4 INJECTION, SOLUTION INFILTRATION; PERINEURAL
Status: COMPLETED | OUTPATIENT
Start: 2018-12-05 | End: 2018-12-05

## 2018-12-05 RX ORDER — LIDOCAINE HYDROCHLORIDE 10 MG/ML
4 INJECTION, SOLUTION INFILTRATION; PERINEURAL
Status: COMPLETED | OUTPATIENT
Start: 2018-12-05 | End: 2018-12-05

## 2018-12-05 RX ADMIN — BETAMETHASONE SODIUM PHOSPHATE AND BETAMETHASONE ACETATE 6 MG: 3; 3 INJECTION, SUSPENSION INTRA-ARTICULAR; INTRALESIONAL; INTRAMUSCULAR; SOFT TISSUE at 08:46

## 2018-12-05 RX ADMIN — LIDOCAINE HYDROCHLORIDE 4 ML: 10 INJECTION, SOLUTION INFILTRATION; PERINEURAL at 08:46

## 2018-12-05 RX ADMIN — BUPIVACAINE HYDROCHLORIDE 4 ML: 2.5 INJECTION, SOLUTION INFILTRATION; PERINEURAL at 08:46

## 2018-12-05 NOTE — PROGRESS NOTES
[facility] [place] [facility] Note    Subjective     Chief Complaint   Patient presents with   • Left Knee - Follow-up     3 Month f/u  Primary osteoarthritis of left knee        HPI      Chava HERNANDES Law is a 63 y.o. male.  He complains of left knee pain.  His last cortisone injection helped for over 6 weeks.  His pain is 7 out of 10.        Past Medical History:   Diagnosis Date   • Acute bronchitis    • Acute pharyngitis    • Conjunctivitis    • Hyperlipidemia    • Hypertension    • IBS (irritable bowel syndrome)    • Lumbago    • Rhinitis       Past Surgical History:   Procedure Laterality Date   • EYE SURGERY Bilateral     multiple on both eyes   • HERNIA REPAIR Right     x2   • KNEE SURGERY Left     arthroscopy with meniscal repair- 10 years ago      Family History   Adopted: Yes     Social History     Socioeconomic History   • Marital status:      Spouse name: Not on file   • Number of children: Not on file   • Years of education: Not on file   • Highest education level: Not on file   Social Needs   • Financial resource strain: Not on file   • Food insecurity - worry: Not on file   • Food insecurity - inability: Not on file   • Transportation needs - medical: Not on file   • Transportation needs - non-medical: Not on file   Occupational History   • Not on file   Tobacco Use   • Smoking status: Former Smoker   • Smokeless tobacco: Never Used   Substance and Sexual Activity   • Alcohol use: Yes     Comment: occ   • Drug use: No   • Sexual activity: Not on file   Other Topics Concern   • Not on file   Social History Narrative   • Not on file      Current Outpatient Medications on File Prior to Visit   Medication Sig Dispense Refill   • ciprofloxacin (CIPRO) 500 MG tablet Take 1 tablet by mouth 2 (Two) Times a Day. 20 tablet 0   • clotrimazole-betamethasone (LOTRISONE) 1-0.05 % cream Apply  topically 3 (Three) Times a Day. 45 g 2   • dicyclomine (BENTYL) 20 MG tablet 1 tablet 4 times a day as needed for  "abdominal pain or diarrhea 60 tablet 11   • hydrochlorothiazide (HYDRODIURIL) 25 MG tablet Take 1 tablet by mouth Daily. 90 tablet 3   • losartan (COZAAR) 100 MG tablet Take 1 tablet by mouth Daily. 90 tablet 3   • meloxicam (MOBIC) 15 MG tablet Take 1 tablet by mouth Daily. 90 tablet 3   • pravastatin (PRAVACHOL) 40 MG tablet Take 1 tablet by mouth Daily. 90 tablet 3   • sucralfate (CARAFATE) 1 GM/10ML suspension Take 10 mL by mouth 4 (Four) Times a Day. 420 mL 11     No current facility-administered medications on file prior to visit.       No Known Allergies     The following portions of the patient's history were reviewed and updated as appropriate: allergies, current medications, past family history, past medical history, past social history, past surgical history and problem list.    Review of Systems   Constitutional: Negative.    HENT: Negative.    Eyes: Negative.    Respiratory: Negative.    Cardiovascular: Negative.    Gastrointestinal: Negative.    Endocrine: Negative.    Genitourinary: Negative.    Musculoskeletal: Positive for arthralgias.   Skin: Negative.    Allergic/Immunologic: Negative.    Neurological: Negative.    Hematological: Negative.    Psychiatric/Behavioral: Negative.         Objective      Physical Exam  Pulse 65   Ht 175.3 cm (69.02\")   Wt 96 kg (211 lb 10.3 oz)   SpO2 98%   BMI 31.24 kg/m²     Body mass index is 31.24 kg/m².        GENERAL APPEARANCE: awake, alert & oriented x 3, in no acute distress and well developed, well nourished  PSYCH: normal mood and affect      Ortho Exam  Peripheral Vascular:    Upper Extremity:   Inspection:  Left--no cyanotic nail beds Right--no cyanotic nail beds   Bilateral:  Pink nail beds with brisk capillary refill   Palpation:  Bilateral radial pulse normal    Musculoskeletal:  Global Assessment:  Overall assessment of Lower Extremity Muscle Strength and Tone:  Left quadriceps--5/5   Left hamstrings--5/5       Left tibialis anterior--5/5  Left " gastroc-soleus--5/5  Left EHL --5/5    Lower Extremity:  Knee/Patella:  No digital clubbing or cyanosis.    Examination of left knee reveals:  Normal deep tendon reflexes, coordination, strength, tone, sensation.  No known fractures or deformities.    Inspection and Palpation:  Left knee:  Tenderness:  Over the medial joint line and moderate severity  Effusion:  none  Crepitus:  Positive  Pulses:  2+  Ecchymosis:  None  Warmth:  None     ROM:  Right:  Extension:5    Flexion:120  Left:  Extension:5     Flexion:120    Instability:    Left:  Lachman Test:  Negative, Varus stress test negative, Valgus stress test negative    Deformities/Malalignments/Discrepancies:    Left:  Genu Varum   Right:  No deformity    Functional Testing:  Jacquelyn's test:  Negative  Patella grind test:  Positive  Q-angle:  normal    Imaging/Studies  Imaging Results (last 7 days)     ** No results found for the last 168 hours. **          Assessment/Plan      No diagnosis found.    Orders Placed This Encounter   Procedures   • Large Joint Arthrocentesis: L knee    I injected his left knee with cortisone.  He'll follow-up as needed.    Medical Decision Making  Management Options : prescription/IM medicine    Raúl Bhatti MD  12/05/18  8:51 AM         EMR Dragon/Transcription disclaimer:  Much of this encounter note is an electronic transcription of spoken language to printed text. Electronic transcription of spoken language may permit erroneous, or at times, nonsensical words or phrases to be inadvertently transcribed. Although I have reviewed the note for such errors, some may still exist.

## 2018-12-05 NOTE — PROGRESS NOTES
Procedure   Large Joint Arthrocentesis: L knee  Date/Time: 12/5/2018 8:46 AM  Consent given by: patient  Site marked: site marked  Timeout: Immediately prior to procedure a time out was called to verify the correct patient, procedure, equipment, support staff and site/side marked as required   Supporting Documentation  Indications: pain   Procedure Details  Location: knee - L knee  Preparation: Patient was prepped and draped in the usual sterile fashion  Needle size: 22 G  Approach: anterolateral  Medications administered: 4 mL lidocaine 1 %; 6 mg betamethasone acetate-betamethasone sodium phosphate 6 (3-3) MG/ML; 4 mL bupivacaine 0.25 %  Patient tolerance: patient tolerated the procedure well with no immediate complications

## 2018-12-26 DIAGNOSIS — R31.9 URINARY TRACT INFECTION WITH HEMATURIA, SITE UNSPECIFIED: ICD-10-CM

## 2018-12-26 DIAGNOSIS — N39.0 URINARY TRACT INFECTION WITH HEMATURIA, SITE UNSPECIFIED: ICD-10-CM

## 2018-12-26 RX ORDER — CIPROFLOXACIN 500 MG/1
500 TABLET, FILM COATED ORAL 2 TIMES DAILY
Qty: 20 TABLET | Refills: 0 | Status: CANCELLED | OUTPATIENT
Start: 2018-12-26

## 2018-12-27 ENCOUNTER — TELEPHONE (OUTPATIENT)
Dept: FAMILY MEDICINE CLINIC | Facility: CLINIC | Age: 63
End: 2018-12-27

## 2018-12-27 DIAGNOSIS — R31.9 URINARY TRACT INFECTION WITH HEMATURIA, SITE UNSPECIFIED: ICD-10-CM

## 2018-12-27 DIAGNOSIS — N39.0 URINARY TRACT INFECTION WITH HEMATURIA, SITE UNSPECIFIED: ICD-10-CM

## 2018-12-27 RX ORDER — CIPROFLOXACIN 500 MG/1
500 TABLET, FILM COATED ORAL 2 TIMES DAILY
Qty: 20 TABLET | Refills: 0 | Status: SHIPPED | OUTPATIENT
Start: 2018-12-27 | End: 2019-11-13

## 2018-12-27 NOTE — TELEPHONE ENCOUNTER
Regarding: Prescription Question  Contact: [phone]  ----- Message from mAPPn, Generic sent at 12/26/2018 12:42 PM EST -----    I did not have any refills on my Ciprofloaxin, so I requested one. I still have a little Sulphur odor in urine. Can Dr. Bangura, please refill this one?    Thank you,    Chava Santiago

## 2018-12-27 NOTE — TELEPHONE ENCOUNTER
Pt has strong odor in his urine.  He wants to know if he can get some cipro called into the pharmacy?  Reshmar  [phone]

## 2019-01-03 ENCOUNTER — LAB REQUISITION (OUTPATIENT)
Dept: LAB | Facility: HOSPITAL | Age: 64
End: 2019-01-03

## 2019-01-03 ENCOUNTER — OUTSIDE FACILITY SERVICE (OUTPATIENT)
Dept: GASTROENTEROLOGY | Facility: CLINIC | Age: 64
End: 2019-01-03

## 2019-01-03 DIAGNOSIS — R13.10 DYSPHAGIA: ICD-10-CM

## 2019-01-03 PROCEDURE — 43239 EGD BIOPSY SINGLE/MULTIPLE: CPT | Performed by: INTERNAL MEDICINE

## 2019-01-03 PROCEDURE — 43249 ESOPH EGD DILATION <30 MM: CPT | Performed by: INTERNAL MEDICINE

## 2019-01-03 PROCEDURE — 88305 TISSUE EXAM BY PATHOLOGIST: CPT | Performed by: INTERNAL MEDICINE

## 2019-01-07 LAB
CYTO UR: NORMAL
LAB AP CASE REPORT: NORMAL
LAB AP CLINICAL INFORMATION: NORMAL
LAB AP DIAGNOSIS COMMENT: NORMAL
PATH REPORT.FINAL DX SPEC: NORMAL
PATH REPORT.GROSS SPEC: NORMAL

## 2019-02-06 ENCOUNTER — OFFICE VISIT (OUTPATIENT)
Dept: ORTHOPEDIC SURGERY | Facility: CLINIC | Age: 64
End: 2019-02-06

## 2019-02-06 VITALS — HEART RATE: 73 BPM | WEIGHT: 208.56 LBS | BODY MASS INDEX: 30.89 KG/M2 | OXYGEN SATURATION: 98 % | HEIGHT: 69 IN

## 2019-02-06 DIAGNOSIS — M17.12 PRIMARY OSTEOARTHRITIS OF LEFT KNEE: Primary | ICD-10-CM

## 2019-02-06 PROCEDURE — 99213 OFFICE O/P EST LOW 20 MIN: CPT | Performed by: ORTHOPAEDIC SURGERY

## 2019-02-06 NOTE — PROGRESS NOTES
[facility] – [place] [facility] Note    Subjective     Chief Complaint   Patient presents with   • Left Knee - Follow-up     Primary Osteoarthritis of Left Knee  Cortisone Injection given 12/05/2019        HPI      Chava Santiago is a 63 y.o. male.  He is follow-up left knee arthritis.  He's had pain for 8 months.  He uses a cane for relief.  Pain is 6 out of 10.  The cortisone injection he got December last 1 day.  It is worse with walking and standing stairs.  It is better with icing the knee.  He's tried anti-inflammatories and use of a cane.        Past Medical History:   Diagnosis Date   • Acute bronchitis    • Acute pharyngitis    • Conjunctivitis    • Hyperlipidemia    • Hypertension    • IBS (irritable bowel syndrome)    • Lumbago    • Rhinitis       Past Surgical History:   Procedure Laterality Date   • EYE SURGERY Bilateral     multiple on both eyes   • HERNIA REPAIR Right     x2   • KNEE SURGERY Left     arthroscopy with meniscal repair- 10 years ago      Family History   Adopted: Yes     Social History     Socioeconomic History   • Marital status:      Spouse name: Not on file   • Number of children: Not on file   • Years of education: Not on file   • Highest education level: Not on file   Social Needs   • Financial resource strain: Not on file   • Food insecurity - worry: Not on file   • Food insecurity - inability: Not on file   • Transportation needs - medical: Not on file   • Transportation needs - non-medical: Not on file   Occupational History   • Not on file   Tobacco Use   • Smoking status: Former Smoker   • Smokeless tobacco: Never Used   Substance and Sexual Activity   • Alcohol use: Yes     Comment: occ   • Drug use: No   • Sexual activity: Not on file   Other Topics Concern   • Not on file   Social History Narrative   • Not on file      Current Outpatient Medications on File Prior to Visit   Medication Sig Dispense Refill   • ciprofloxacin (CIPRO) 500 MG tablet Take 1 tablet by mouth  "2 (Two) Times a Day. 20 tablet 0   • clotrimazole-betamethasone (LOTRISONE) 1-0.05 % cream Apply  topically 3 (Three) Times a Day. 45 g 2   • dicyclomine (BENTYL) 20 MG tablet 1 tablet 4 times a day as needed for abdominal pain or diarrhea 60 tablet 11   • hydrochlorothiazide (HYDRODIURIL) 25 MG tablet Take 1 tablet by mouth Daily. 90 tablet 3   • losartan (COZAAR) 100 MG tablet Take 1 tablet by mouth Daily. 90 tablet 3   • meloxicam (MOBIC) 15 MG tablet Take 1 tablet by mouth Daily. 90 tablet 3   • omeprazole (priLOSEC) 40 MG capsule Take 1 capsule by mouth Daily. 30 capsule 5   • pravastatin (PRAVACHOL) 40 MG tablet Take 1 tablet by mouth Daily. 90 tablet 3   • sucralfate (CARAFATE) 1 GM/10ML suspension Take 10 mL by mouth 4 (Four) Times a Day. 420 mL 11     No current facility-administered medications on file prior to visit.       No Known Allergies     The following portions of the patient's history were reviewed and updated as appropriate: allergies, current medications, past family history, past medical history, past social history, past surgical history and problem list.    Review of Systems   Constitutional: Negative.    HENT: Negative.    Eyes: Negative.    Respiratory: Negative.    Cardiovascular: Negative.    Gastrointestinal: Negative.    Endocrine: Negative.    Genitourinary: Negative.    Musculoskeletal: Positive for joint swelling.        Joint Pain    Skin: Negative.    Allergic/Immunologic: Negative.    Neurological: Negative.    Hematological: Negative.    Psychiatric/Behavioral: Negative.         Objective      Physical Exam  Pulse 73   Ht 175.3 cm (69.02\")   Wt 94.6 kg (208 lb 8.9 oz)   SpO2 98%   BMI 30.78 kg/m²     Body mass index is 30.78 kg/m².        GENERAL APPEARANCE: awake, alert & oriented x 3, in no acute distress and well developed, well nourished  PSYCH: normal mood and affect    Ortho Exam  Peripheral Vascular:    Upper Extremity:   Inspection:  Left--no cyanotic nail " beds Right--no cyanotic nail beds   Bilateral:  Pink nail beds with brisk capillary refill   Palpation:  Bilateral radial pulse normal    Musculoskeletal:  Global Assessment:  Overall assessment of Lower Extremity Muscle Strength and Tone:  Left quadriceps--5/5   Left hamstrings--5/5       Left tibialis anterior--5/5  Left gastroc-soleus--5/5  Left EHL --5/5    Lower Extremity:  Knee/Patella:  No digital clubbing or cyanosis.    Examination of left knee reveals:  Normal deep tendon reflexes, coordination, strength, tone, sensation.  No known fractures or deformities.    Inspection and Palpation:  Left knee:  Tenderness:  Over the medial joint line and moderate severity  Effusion:  none  Crepitus:  Positive  Pulses:  2+  Ecchymosis:  None  Warmth:  None     ROM:  Right:  Extension:5    Flexion:120  Left:  Extension:5     Flexion:120    Instability:    Left:  Lachman Test:  Negative, Varus stress test negative, Valgus stress test negative    Deformities/Malalignments/Discrepancies:    Left:  Genu Varum   Right:  No deformity    Functional Testing:  Jacquelyn's test:  Negative  Patella grind test:  Positive  Q-angle:  normal    Imaging/Studies  Imaging Results (last 7 days)     ** No results found for the last 168 hours. **          Assessment/Plan        ICD-10-CM ICD-9-CM   1. Primary osteoarthritis of left knee M17.12 715.16       Orders Placed This Encounter   Procedures   • Large Joint Arthrocentesis    I will order him medial compartment  brace and Orthovisc injections.  He has failed cortisone injections anti-inflammatories and use of a cane.  If the Orthovisc injections do not work the next step is a total knee arthroplasty.  He is agreeable to this plan.  All of his questions were answered.    Medical Decision Making  Management Options : prescription/IM medicine      Raúl Bhatti MD  02/06/19  8:07 AM         EMR Dragon/Transcription disclaimer:  Much of this encounter note is an electronic  transcription of spoken language to printed text. Electronic transcription of spoken language may permit erroneous, or at times, nonsensical words or phrases to be inadvertently transcribed. Although I have reviewed the note for such errors, some may still exist.

## 2019-03-06 ENCOUNTER — CLINICAL SUPPORT (OUTPATIENT)
Dept: ORTHOPEDIC SURGERY | Facility: CLINIC | Age: 64
End: 2019-03-06

## 2019-03-06 DIAGNOSIS — M17.12 PRIMARY OSTEOARTHRITIS OF LEFT KNEE: Primary | ICD-10-CM

## 2019-03-06 PROCEDURE — 20610 DRAIN/INJ JOINT/BURSA W/O US: CPT | Performed by: ORTHOPAEDIC SURGERY

## 2019-03-06 NOTE — PROGRESS NOTES
Procedure   Large Joint Arthrocentesis: L knee  Date/Time: 3/6/2019 8:00 AM  Consent given by: patient  Site marked: site marked  Timeout: Immediately prior to procedure a time out was called to verify the correct patient, procedure, equipment, support staff and site/side marked as required   Supporting Documentation  Indications: pain   Procedure Details  Location: knee - L knee  Preparation: Patient was prepped and draped in the usual sterile fashion  Needle size: 22 G  Approach: anterolateral  Medications administered: 30 mg Hyaluronan 30 MG/2ML  Patient tolerance: patient tolerated the procedure well with no immediate complications         I injected left knee with Orthovisc.

## 2019-03-13 ENCOUNTER — CLINICAL SUPPORT (OUTPATIENT)
Dept: ORTHOPEDIC SURGERY | Facility: CLINIC | Age: 64
End: 2019-03-13

## 2019-03-13 DIAGNOSIS — M17.12 PRIMARY OSTEOARTHRITIS OF LEFT KNEE: Primary | ICD-10-CM

## 2019-03-13 PROCEDURE — 20610 DRAIN/INJ JOINT/BURSA W/O US: CPT | Performed by: ORTHOPAEDIC SURGERY

## 2019-03-13 NOTE — PROGRESS NOTES
Procedure   Large Joint Arthrocentesis: L knee  Date/Time: 3/13/2019 8:24 AM  Consent given by: patient  Site marked: site marked  Timeout: Immediately prior to procedure a time out was called to verify the correct patient, procedure, equipment, support staff and site/side marked as required   Supporting Documentation  Indications: pain   Procedure Details  Location: knee - L knee  Preparation: Patient was prepped and draped in the usual sterile fashion  Needle size: 22 G  Approach: anterolateral  Medications administered: 30 mg Hyaluronan 30 MG/2ML  Patient tolerance: patient tolerated the procedure well with no immediate complications         I injected the left knee.  The patient is doing well without complications

## 2019-03-20 ENCOUNTER — CLINICAL SUPPORT (OUTPATIENT)
Dept: ORTHOPEDIC SURGERY | Facility: CLINIC | Age: 64
End: 2019-03-20

## 2019-03-20 DIAGNOSIS — M17.12 PRIMARY OSTEOARTHRITIS OF LEFT KNEE: Primary | ICD-10-CM

## 2019-03-20 PROCEDURE — 20610 DRAIN/INJ JOINT/BURSA W/O US: CPT | Performed by: ORTHOPAEDIC SURGERY

## 2019-03-20 NOTE — PROGRESS NOTES
Procedure   Large Joint Arthrocentesis: L knee  Date/Time: 3/20/2019 7:56 AM  Consent given by: patient  Site marked: site marked  Timeout: Immediately prior to procedure a time out was called to verify the correct patient, procedure, equipment, support staff and site/side marked as required   Supporting Documentation  Indications: pain   Procedure Details  Location: knee - L knee  Preparation: Patient was prepped and draped in the usual sterile fashion  Needle size: 22 G  Approach: anterolateral  Medications administered: 30 mg Hyaluronan 30 MG/2ML  Patient tolerance: patient tolerated the procedure well with no immediate complications         I injected the left knee with Orthovisc.  He will follow-up in 3 weeks.

## 2019-04-10 ENCOUNTER — OFFICE VISIT (OUTPATIENT)
Dept: ORTHOPEDIC SURGERY | Facility: CLINIC | Age: 64
End: 2019-04-10

## 2019-04-10 VITALS — OXYGEN SATURATION: 97 % | HEIGHT: 69 IN | HEART RATE: 64 BPM | BODY MASS INDEX: 30.89 KG/M2 | WEIGHT: 208.56 LBS

## 2019-04-10 DIAGNOSIS — M23.92 KNEE LOCKING, LEFT: ICD-10-CM

## 2019-04-10 DIAGNOSIS — M17.12 PRIMARY OSTEOARTHRITIS OF LEFT KNEE: Primary | ICD-10-CM

## 2019-04-10 PROCEDURE — 99214 OFFICE O/P EST MOD 30 MIN: CPT | Performed by: ORTHOPAEDIC SURGERY

## 2019-04-10 NOTE — PROGRESS NOTES
[facility] [place] [facility] Note    Subjective     Chief Complaint   Patient presents with   • Left Knee - Follow-up     3 weeks        HPI      Chava Santiago is a 63 y.o. male.  The Orthovisc injections helped 40%.  He now complains of left knee locking.  Worse with walking and stairs.  Pain is 4 out of 10 and aching.  He uses a knee brace and cane      Past Medical History:   Diagnosis Date   • Acute bronchitis    • Acute pharyngitis    • Conjunctivitis    • Hyperlipidemia    • Hypertension    • IBS (irritable bowel syndrome)    • Lumbago    • Rhinitis       Past Surgical History:   Procedure Laterality Date   • EYE SURGERY Bilateral     multiple on both eyes   • HERNIA REPAIR Right     x2   • KNEE SURGERY Left     arthroscopy with meniscal repair- 10 years ago      Family History   Adopted: Yes     Social History     Socioeconomic History   • Marital status:      Spouse name: Not on file   • Number of children: Not on file   • Years of education: Not on file   • Highest education level: Not on file   Tobacco Use   • Smoking status: Former Smoker   • Smokeless tobacco: Never Used   Substance and Sexual Activity   • Alcohol use: Yes     Comment: occ   • Drug use: No      Current Outpatient Medications on File Prior to Visit   Medication Sig Dispense Refill   • ciprofloxacin (CIPRO) 500 MG tablet Take 1 tablet by mouth 2 (Two) Times a Day. 20 tablet 0   • clotrimazole-betamethasone (LOTRISONE) 1-0.05 % cream Apply  topically 3 (Three) Times a Day. 45 g 2   • dicyclomine (BENTYL) 20 MG tablet 1 tablet 4 times a day as needed for abdominal pain or diarrhea 60 tablet 11   • hydrochlorothiazide (HYDRODIURIL) 25 MG tablet Take 1 tablet by mouth Daily. 90 tablet 3   • losartan (COZAAR) 100 MG tablet Take 1 tablet by mouth Daily. 90 tablet 3   • meloxicam (MOBIC) 15 MG tablet Take 1 tablet by mouth Daily. 90 tablet 3   • omeprazole (priLOSEC) 40 MG capsule Take 1 capsule by mouth Daily. 30 capsule 5   •  "pravastatin (PRAVACHOL) 40 MG tablet Take 1 tablet by mouth Daily. 90 tablet 3   • sucralfate (CARAFATE) 1 GM/10ML suspension Take 10 mL by mouth 4 (Four) Times a Day. 420 mL 11     No current facility-administered medications on file prior to visit.       No Known Allergies     The following portions of the patient's history were reviewed and updated as appropriate: allergies, current medications, past family history, past medical history, past social history, past surgical history and problem list.    Review of Systems   Constitutional: Negative.    HENT: Negative.    Eyes: Negative.    Respiratory: Negative.    Cardiovascular: Negative.    Gastrointestinal: Negative.    Endocrine: Negative.    Genitourinary: Negative.    Musculoskeletal: Positive for arthralgias.   Skin: Negative.    Allergic/Immunologic: Negative.    Neurological: Negative.    Hematological: Negative.    Psychiatric/Behavioral: Negative.         Objective      Physical Exam  Pulse 64   Ht 175.3 cm (69.02\")   Wt 94.6 kg (208 lb 8.9 oz)   SpO2 97%   BMI 30.78 kg/m²     Body mass index is 30.78 kg/m².        GENERAL APPEARANCE: awake, alert & oriented x 3, in no acute distress and well developed, well nourished  PSYCH: normal mood and affect      Ortho Exam  Peripheral Vascular:    Upper Extremity:   Inspection:  Left--no cyanotic nail beds Right--no cyanotic nail beds   Bilateral:  Pink nail beds with brisk capillary refill   Palpation:  Bilateral radial pulse normal    Musculoskeletal:  Global Assessment:  Overall assessment of Lower Extremity Muscle Strength and Tone:  Left quadriceps--5/5   Left hamstrings--5/5       Left tibialis anterior--5/5  Left gastroc-soleus--5/5  Left EHL --5/5    Lower Extremity:  Knee/Patella:  No digital clubbing or cyanosis.    Examination of left knee reveals:  Normal deep tendon reflexes, coordination, strength, tone, sensation.  No known fractures or deformities.    Inspection and Palpation:  Left " knee:  Tenderness:  Over the medial joint line and moderate severity  Effusion:  none  Crepitus:  Positive  Pulses:  2+  Ecchymosis:  None  Warmth:  None     ROM:  Right:  Extension:5    Flexion:120  Left:  Extension:5     Flexion:120    Instability:    Left:  Lachman Test:  Negative, Varus stress test negative, Valgus stress test negative    Deformities/Malalignments/Discrepancies:    Left:  Genu Varum   Right:  No deformity    Functional Testing:  Jacquelyn's test:  Negative  Patella grind test:  Positive  Q-angle:  normal    Imaging/Studies  Imaging Results (last 7 days)     ** No results found for the last 168 hours. **          Assessment/Plan        ICD-10-CM ICD-9-CM   1. Primary osteoarthritis of left knee M17.12 715.16   2. Knee locking, left M23.92 717.9       Orders Placed This Encounter   Procedures   • MRI Knee Left Without Contrast      He has had injections which have helped some.  But he has locking.  Therefore we will get an MRI to evaluate for medial meniscus tear.  He will continue his brace and cane  Medical Decision Making  Management Options : over-the-counter medicine  Data/Risk: radiology tests    Raúl Bhatti MD  04/10/19  8:57 AM         EMR Dragon/Transcription disclaimer:  Much of this encounter note is an electronic transcription of spoken language to printed text. Electronic transcription of spoken language may permit erroneous, or at times, nonsensical words or phrases to be inadvertently transcribed. Although I have reviewed the note for such errors, some may still exist.

## 2019-04-18 ENCOUNTER — HOSPITAL ENCOUNTER (OUTPATIENT)
Dept: MRI IMAGING | Facility: HOSPITAL | Age: 64
Discharge: HOME | End: 2019-04-18
Admitting: ORTHOPAEDIC SURGERY

## 2019-04-18 DIAGNOSIS — M23.92 KNEE LOCKING, LEFT: ICD-10-CM

## 2019-04-18 PROCEDURE — 73721 MRI JNT OF LWR EXTRE W/O DYE: CPT

## 2019-05-01 ENCOUNTER — OFFICE VISIT (OUTPATIENT)
Dept: ORTHOPEDIC SURGERY | Facility: CLINIC | Age: 64
End: 2019-05-01

## 2019-05-01 VITALS — HEIGHT: 69 IN | HEART RATE: 64 BPM | OXYGEN SATURATION: 94 % | BODY MASS INDEX: 30.37 KG/M2 | WEIGHT: 205.03 LBS

## 2019-05-01 DIAGNOSIS — M23.92 KNEE LOCKING, LEFT: ICD-10-CM

## 2019-05-01 DIAGNOSIS — S83.232D COMPLEX TEAR OF MEDIAL MENISCUS OF LEFT KNEE AS CURRENT INJURY, SUBSEQUENT ENCOUNTER: ICD-10-CM

## 2019-05-01 DIAGNOSIS — M17.12 PRIMARY OSTEOARTHRITIS OF LEFT KNEE: Primary | ICD-10-CM

## 2019-05-01 PROCEDURE — 99214 OFFICE O/P EST MOD 30 MIN: CPT | Performed by: ORTHOPAEDIC SURGERY

## 2019-05-01 NOTE — PROGRESS NOTES
[facility] [place] [facility] Note    Subjective     Chief Complaint   Patient presents with   • Follow-up     MRI Left Knee 4/18/19        SHAYNA HERNANDES Law is a 63 y.o. male.  He is follow-up MRI of the left knee.  He has had pain for 9 months but for the past month has been locking.  Pain is 5 out of 10 aching and burning.        Past Medical History:   Diagnosis Date   • Acute bronchitis    • Acute pharyngitis    • Conjunctivitis    • Hyperlipidemia    • Hypertension    • IBS (irritable bowel syndrome)    • Lumbago    • Rhinitis       Past Surgical History:   Procedure Laterality Date   • EYE SURGERY Bilateral     multiple on both eyes   • HERNIA REPAIR Right     x2   • KNEE SURGERY Left     arthroscopy with meniscal repair- 10 years ago      Family History   Adopted: Yes     Social History     Socioeconomic History   • Marital status:      Spouse name: Not on file   • Number of children: Not on file   • Years of education: Not on file   • Highest education level: Not on file   Tobacco Use   • Smoking status: Former Smoker   • Smokeless tobacco: Never Used   Substance and Sexual Activity   • Alcohol use: Yes     Comment: occ   • Drug use: No      Current Outpatient Medications on File Prior to Visit   Medication Sig Dispense Refill   • ciprofloxacin (CIPRO) 500 MG tablet Take 1 tablet by mouth 2 (Two) Times a Day. 20 tablet 0   • clotrimazole-betamethasone (LOTRISONE) 1-0.05 % cream Apply  topically 3 (Three) Times a Day. 45 g 2   • dicyclomine (BENTYL) 20 MG tablet 1 tablet 4 times a day as needed for abdominal pain or diarrhea 60 tablet 11   • hydrochlorothiazide (HYDRODIURIL) 25 MG tablet Take 1 tablet by mouth Daily. 90 tablet 3   • losartan (COZAAR) 100 MG tablet Take 1 tablet by mouth Daily. 90 tablet 3   • meloxicam (MOBIC) 15 MG tablet Take 1 tablet by mouth Daily. 90 tablet 3   • omeprazole (priLOSEC) 40 MG capsule Take 1 capsule by mouth Daily. 30 capsule 5   • pravastatin (PRAVACHOL)  "40 MG tablet Take 1 tablet by mouth Daily. 90 tablet 3   • sucralfate (CARAFATE) 1 GM/10ML suspension Take 10 mL by mouth 4 (Four) Times a Day. 420 mL 11     No current facility-administered medications on file prior to visit.       No Known Allergies     The following portions of the patient's history were reviewed and updated as appropriate: allergies, current medications, past family history, past medical history, past social history, past surgical history and problem list.    Review of Systems   Constitutional: Negative.    HENT: Negative.    Eyes: Negative.    Respiratory: Negative.    Cardiovascular: Negative.    Gastrointestinal: Negative.    Endocrine: Negative.    Genitourinary: Negative.    Musculoskeletal: Positive for arthralgias (knee pain).   Skin: Negative.    Allergic/Immunologic: Negative.    Neurological: Negative.    Hematological: Negative.    Psychiatric/Behavioral: Negative.         Objective      Physical Exam  Pulse 64   Ht 175.3 cm (69.02\")   Wt 93 kg (205 lb 0.4 oz)   SpO2 94%   BMI 30.26 kg/m²     Body mass index is 30.26 kg/m².        GENERAL APPEARANCE: awake, alert & oriented x 3, in no acute distress and well developed, well nourished  PSYCH: normal mood and affect      Ortho Exam  Peripheral Vascular:    Upper Extremity:   Inspection:  Left--no cyanotic nail beds Right--no cyanotic nail beds   Bilateral:  Pink nail beds with brisk capillary refill   Palpation:  Bilateral radial pulse normal    Musculoskeletal:  Global Assessment:  Overall assessment of Lower Extremity Muscle Strength and Tone:  Left quadriceps--5/5   Left hamstrings--5/5       Left tibialis anterior--5/5  Left gastroc-soleus--5/5  Left EHL --5/5    Lower Extremity:  Knee/Patella:  No digital clubbing or cyanosis.    Examination of left knee reveals:  Normal deep tendon reflexes, coordination, strength, tone, sensation.  No known fractures or deformities.    Inspection and Palpation:  Left knee:  Tenderness:  Over " the medial joint line and moderate severity  Effusion:  none  Crepitus:  Positive  Pulses:  2+  Ecchymosis:  None  Warmth:  None     ROM:  Right:  Extension:5    Flexion:120  Left:  Extension:5     Flexion:120    Instability:    Left:  Lachman Test:  Negative, Varus stress test negative, Valgus stress test negative    Deformities/Malalignments/Discrepancies:    Left:  Genu Varum   Right:  No deformity    Functional Testing:  Jacquelyn's test:  Negative  Patella grind test:  Positive  Q-angle:  normal    Imaging/Studies  Imaging Results (last 7 days)     ** No results found for the last 168 hours. **      I viewed the MRI from April 18 which shows a medial meniscus tear and is from the arthritis    Assessment/Plan        ICD-10-CM ICD-9-CM   1. Primary osteoarthritis of left knee M17.12 715.16   2. Knee locking, left M23.92 717.9   3. Complex tear of medial meniscus of left knee as current injury, subsequent encounter S83.232D V58.89     The plan to be left knee arthroscopy partial medial meniscectomy.  He still has pre-existing arthritis that will not be cured with a scope.  But we should be able to get rid of the locking.  He will need restrictions of 4 to 6 weeks post surgery of a seated job only.Treatment options and alternatives were discussed with patient.  Surgical versus non surgical treatment options were discussed as well.    We reviewed the nature of the planned procedure including the risks, benefits and potential complications.  Understanding was expressed and the decision was made to proceed with surgery.    Medical Decision Making  Management Options : over-the-counter medicine and major surgery with risk factors  Data/Risk: radiology tests and independent visualization of imaging, lab tests, or EMG/NCV    Raúl Bhatti MD  05/01/19  11:34 AM         EMR Dragon/Transcription disclaimer:  Much of this encounter note is an electronic transcription of spoken language to printed text. Electronic  transcription of spoken language may permit erroneous, or at times, nonsensical words or phrases to be inadvertently transcribed. Although I have reviewed the note for such errors, some may still exist.

## 2019-05-24 ENCOUNTER — TELEPHONE (OUTPATIENT)
Dept: ORTHOPEDIC SURGERY | Facility: CLINIC | Age: 64
End: 2019-05-24

## 2019-05-24 NOTE — TELEPHONE ENCOUNTER
CALLED PATIENT TO ADVISE OF 8:30AM ARRIVAL TIME FOR SURGERY ON 5/28/19 WITH DR BOND.  NO ANSWER, LEFT VOICEMAIL WITH DETAILS AND REQUEST TO RETURN CALL CONFIRMING RECEIPT OF MESSAGE.

## 2019-05-28 ENCOUNTER — LAB REQUISITION (OUTPATIENT)
Dept: LAB | Facility: HOSPITAL | Age: 64
End: 2019-05-28

## 2019-05-28 ENCOUNTER — OUTSIDE FACILITY SERVICE (OUTPATIENT)
Dept: ORTHOPEDIC SURGERY | Facility: CLINIC | Age: 64
End: 2019-05-28

## 2019-05-28 DIAGNOSIS — S83.232D COMPLEX TEAR OF MEDIAL MENISCUS, CURRENT INJURY, LEFT KNEE, SUBSEQUENT ENCOUNTER: ICD-10-CM

## 2019-05-28 LAB — POTASSIUM BLD-SCNC: 4 MMOL/L (ref 3.5–5.2)

## 2019-05-28 PROCEDURE — 84132 ASSAY OF SERUM POTASSIUM: CPT | Performed by: ORTHOPAEDIC SURGERY

## 2019-05-28 PROCEDURE — 29881 ARTHRS KNE SRG MNISECTMY M/L: CPT | Performed by: ORTHOPAEDIC SURGERY

## 2019-06-03 ENCOUNTER — OFFICE VISIT (OUTPATIENT)
Dept: ORTHOPEDIC SURGERY | Facility: CLINIC | Age: 64
End: 2019-06-03

## 2019-06-03 DIAGNOSIS — Z98.890 STATUS POST ARTHROSCOPY OF LEFT KNEE: Primary | ICD-10-CM

## 2019-06-03 PROCEDURE — 99024 POSTOP FOLLOW-UP VISIT: CPT | Performed by: ORTHOPAEDIC SURGERY

## 2019-06-03 NOTE — PROGRESS NOTES
Chief Complaint   Patient presents with   • Left Knee - Post-op     1 week status post Left Knee arthroscopy with partial medial meniscectomy           HPI    He is doing well follow-up left knee scope partial medial meniscectomy May 28.    There were no vitals filed for this visit.      Physical Exam:  His wounds are good.  He is neurovascular intact.  He has full motion.  No effusion.  Negative Homans sign.            ICD-10-CM ICD-9-CM   1. Status post arthroscopy of left knee Z98.890 V45.89       Orders Placed This Encounter   Procedures   • Ambulatory Referral to Physical Therapy   He will do physical therapy.  He is a personal injury off work and I will see him back in 3 weeks.

## 2019-06-05 ENCOUNTER — HOSPITAL ENCOUNTER (OUTPATIENT)
Dept: PHYSICAL THERAPY | Facility: HOSPITAL | Age: 64
Setting detail: THERAPIES SERIES
Discharge: HOME | End: 2019-06-05

## 2019-06-05 DIAGNOSIS — S83.232A COMPLEX TEAR OF MEDIAL MENISCUS OF LEFT KNEE, UNSPECIFIED WHETHER OLD OR CURRENT TEAR, INITIAL ENCOUNTER: Primary | ICD-10-CM

## 2019-06-05 PROCEDURE — 97161 PT EVAL LOW COMPLEX 20 MIN: CPT | Performed by: PHYSICAL THERAPIST

## 2019-06-05 NOTE — THERAPY EVALUATION
Outpatient Physical Therapy Ortho Initial Evaluation  [facility]     Patient Name: Chava Santiago  : 1955  MRN: [id number]  Today's Date: 2019      Visit Date: 2019    Patient Active Problem List   Diagnosis   • Essential hypertension, benign   • Esophageal reflux   • Hyperlipidemia   • IBS (irritable bowel syndrome)   • Sinus bradycardia        Past Medical History:   Diagnosis Date   • Acute bronchitis    • Acute pharyngitis    • Conjunctivitis    • Hyperlipidemia    • Hypertension    • IBS (irritable bowel syndrome)    • Lumbago    • Rhinitis         Past Surgical History:   Procedure Laterality Date   • EYE SURGERY Bilateral     multiple on both eyes   • HERNIA REPAIR Right     x2   • KNEE SURGERY Left     arthroscopy with meniscal repair- 10 years ago       Visit Dx:     ICD-10-CM ICD-9-CM   1. Complex tear of medial meniscus of left knee, unspecified whether old or current tear, initial encounter S83.232A 836.0         Patient History     Row Name 19 1300             History    Chief Complaint  Pain  -CR      Type of Pain  Knee pain  -CR      Date Current Problem(s) Began  19  -CR      Brief Description of Current Complaint  64 yo male underwent menisectomy left knee 2019 with Dr. Bhatti.  Last August client began to have pain and had steroid injecteion that where not beneficial.  Decided to undergo surgical intervention.  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  He reports knee is going well.    -CR      Previous treatment for THIS PROBLEM  Surgery  -CR      Surgery Date:  19  -CR      Patient/Caregiver Goals  Relieve pain;Return to work  -CR      Current Tobacco Use  no  -CR      Smoking Status  no  -CR      Hand Dominance  right-handed  -CR      Patient seeing anyone else for problem(s)?  Dr. Bhatti  -CR      What clinical tests have you had for this problem?  MRI  -CR      Results of Clinical Tests  torn meniscus left knee  -CR          Pain     Pain Location  Knee  -CR      Pain at Present  3  -CR      Pain Frequency  Several days a week;Intermittent  -CR      What Performance Factors Make the Current Problem(s) WORSE?  prolonged walking  -CR      What Performance Factors Make the Current Problem(s) BETTER?  ice  -CR      Is your sleep disturbed?  Yes  -CR      Is medication used to assist with sleep?  No  -CR      Difficulties at work?  yes, out of work   -CR      Difficulties with ADL's?  yes, ambulatoin and work duty  -CR         Fall Risk Assessment    Any falls in the past year:  No  -CR      Does patient have a fear of falling  No  -CR         Daily Activities    Primary Language  English  -CR      How does patient learn best?  Demonstration  -CR      Teaching needs identified  Home Exercise Program;Management of Condition  -CR      Patient is concerned about/has problems with  Difficulty with self care (i.e. bathing, dressing, toileting:;Climbing Stairs;Performing job responsibilities/community activities (work, school,;Performing home management (household chores, shopping, care of dependents)  -CR      Does patient have problems with the following?  None  -CR      Barriers to learning  None  -CR      Pt Participated in POC and Goals  Yes  -CR         Safety    Are you being hurt, hit, or frightened by anyone at home or in your life?  No  -CR      Are you being neglected by a caregiver  No  -CR        User Key  (r) = Recorded By, (t) = Taken By, (c) = Cosigned By    Initials Name Provider Type    Jaime Robertson PT Physical Therapist          PT Ortho     Row Name 06/05/19 1200       Precautions and Contraindications    Precautions/Limitations  no known precautions/limitations  -CR       Special Tests/Palpation    Special Tests/Palpation  Knee  -CR       Knee Palpation    Knee Palpation?  Yes  -CR    Lateral Condyle  Tender  -CR    Medial Condyle  Tender  -CR       Patellar Accessory Motions    Patellar Accessory Motions Tested?   Yes  -CR    Superior glide  Hypomobile  -CR    Inferior glide  Hypomobile  -CR    Medial glide  Hypomobile  -CR    Lateral glide  Hypomobile  -CR       General ROM    RT Lower Ext  Rt Knee Extension/Flexion  -CR    LT Lower Ext  Lt Knee Extension/Flexion  -CR       Right Lower Ext    Rt Knee Extension/Flexion AROM  0-135  -CR       Left Lower Ext    Lt Knee Extension/Flexion AROM  0-5-125  -CR       MMT (Manual Muscle Testing)    General MMT Comments  SLR assessed, able to complete wtihout signficant lag  -CR       Pathomechanics    Lower Extremity Pathomechanics  Excessive hip flexion with swing through;Antalgic with midstance  -CR       Transfers    Sit-Stand [place] (Transfers)  independent  -CR    Stand-Sit [place] (Transfers)  independent  -CR       Gait/Stairs Assessment/Training    [place] Level (Gait)  independent  -CR    Ascending Technique (Stairs)  step-to-step  -CR    Descending Technique (Stairs)  step-to-step  -CR      User Key  (r) = Recorded By, (t) = Taken By, (c) = Cosigned By    Initials Name Provider Type    Jaime Robertson, PT Physical Therapist                      Therapy Education  Education Details: Client provided written HEP including heel slides, quad setrting, SLR, hip extension, hamstring stretch  Given: HEP, Symptoms/condition management, Pain management, Posture/body mechanics  Program: New  How Provided: Verbal, Demonstration, Written  Provided to: Patient  Level of Understanding: Verbalized, Demonstrated     PT OP Goals     Row Name 06/05/19 1200          PT Short Term Goals    STG Date to Achieve  07/03/19  -CR     STG 1  client will be independent with initial HEP  -CR     STG 1 Progress  New  -CR     STG 2  client will demonstrate full AROM left knee  -CR     STG 2 Progress  New  -CR     STG 3  clietn will demonstrate 4+/5 strength left knee flexion and extension  -CR     STG 3 Progress  New  -CR        Long Term Goals    LTG Date to Achieve  07/31/19  -CR      LTG 1  client will return to work without pain  -CR     LTG 1 Progress  New  -CR     LTG 2  client will demonstrate non antalgic gait  -CR     LTG 2 Progress  New  -CR     LTG 3  client will report LEFS increase by 10 points   -CR     LTG 3 Progress  New  -CR        Time Calculation    PT Goal Re-Cert Due Date  09/03/19  -CR       User Key  (r) = Recorded By, (t) = Taken By, (c) = Cosigned By    Initials Name Provider Type    Jaime Robertson, PT Physical Therapist          PT Assessment/Plan     Row Name 06/05/19 1337 06/05/19 1336       PT Assessment    Functional Limitations  --  Impaired locomotion;Limitation in home management;Performance in leisure activities;Performance in self-care ADL;Performance in work activities  -CR    Impairments  --  Pain;Muscle strength;Motor function;Range of motion;Joint mobility  -CR    Assessment Comments  62 yo male arrives with evolving symptoms of low complexity.  He underwent left menisectomy 5/28/19.  He demonstrates signs and symptoms consistent with s/p left menisecomty including ROM, flexibility, and strength deficits contributing to abnormal gait mechanics. He will benefit from skilled services to address impairment and progress towards goal.   -CR  --    Please refer to paper survey for additional self-reported information  --  Yes  -CR    Rehab Potential  --  Excellent  -CR    Patient/caregiver participated in establishment of treatment plan and goals  --  Yes  -CR    Patient would benefit from skilled therapy intervention  --  Yes  -CR       PT Plan    PT Frequency  --  2x/week  -CR    Predicted Duration of Therapy Intervention (Therapy Eval)  --  8 visits  -CR    Planned CPT's?  --  PT EVAL LOW COMPLEXITY: 70410;PT RE-EVAL: 87235;PT MANUAL THERAPY EA 15 MIN: 75781;PT HOT/COLD PACK WC NONMCARE: 26132;PT THER PROC EA 15 MIN: 69164;PT NEUROMUSC RE-EDUCATION EA 15 MIN: 52427;PT THER ACT EA 15 MIN: 93642;PT GAIT TRAINING EA 15 MIN: 27566;PT SELF CARE/HOME  MGMT/TRAIN  15: 40226  -CR    Physical Therapy Interventions (Optional Details)  --  home exercise program;joint mobilization;manual therapy techniques;modalities;neuromuscular re-education;patient/family education;ROM (Range of Motion);stair training;strengthening;stretching  -CR    PT Plan Comments  --  flexibility, strength training, gait training and mechanics for goal of return to work.    -CR      User Key  (r) = Recorded By, (t) = Taken By, (c) = Cosigned By    Initials Name Provider Type    Jaime Robertson, PT Physical Therapist                              Outcome Measure Options: Lower Extremity Functional Scale (LEFS)  Lower Extremity Functional Index  Any of your usual work, housework or school activities: Moderate difficulty  Your usual hobbies, recreational or sporting activities: Quite a bit of difficulty  Getting into or out of the bath: A little bit of difficulty  Walking between rooms: A little bit of difficulty  Putting on your shoes or socks: A little bit of difficulty  Squatting: A little bit of difficulty  Lifting an object, like a bag of groceries from the floor: A little bit of difficulty  Performing light activities around your home: A little bit of difficulty  Performing heavy activities around your home: Moderate difficulty  Getting into or out of a car: A little bit of difficulty  Walking 2 blocks: Moderate difficulty  Walking a mile: Quite a bit of difficulty  Going up or down 10 stairs (about 1 flight of stairs): Quite a bit of difficulty  Standing for 1 hour: Quite a bit of difficulty  Sitting for 1 hour: No difficulty  Running on even ground: Extreme difficulty or unable to perform activity  Running on uneven ground: Extreme difficulty or unable to perform activity  Making sharp turns while running fast: Extreme difficulty or unable to perform activity  Hopping: Extreme difficulty or unable to perform activity  Rolling over in bed: No difficulty  Total: 39      Time  Calculation:     Start Time: 1305     Therapy Charges for Today     Code Description Service Date Service Provider Modifiers Qty    38102746634 HC PT EVAL LOW COMPLEXITY 3 6/5/2019 Jaime Marina, PT GP 1          PT G-Codes  Outcome Measure Options: Lower Extremity Functional Scale (LEFS)  Total: 39         Jaime Marina, PT  6/5/2019

## 2019-06-10 ENCOUNTER — HOSPITAL ENCOUNTER (OUTPATIENT)
Dept: PHYSICAL THERAPY | Facility: HOSPITAL | Age: 64
Setting detail: THERAPIES SERIES
Discharge: HOME | End: 2019-06-10

## 2019-06-10 DIAGNOSIS — S83.232A COMPLEX TEAR OF MEDIAL MENISCUS OF LEFT KNEE, UNSPECIFIED WHETHER OLD OR CURRENT TEAR, INITIAL ENCOUNTER: Primary | ICD-10-CM

## 2019-06-10 PROCEDURE — 97110 THERAPEUTIC EXERCISES: CPT | Performed by: PHYSICAL THERAPIST

## 2019-06-10 NOTE — THERAPY TREATMENT NOTE
Outpatient Physical Therapy Ortho Treatment Note  [facility]     Patient Name: Chava Santiago  : 1955  MRN: [id number]  Today's Date: 6/10/2019      Visit Date: 06/10/2019    Visit Dx:    ICD-10-CM ICD-9-CM   1. Complex tear of medial meniscus of left knee, unspecified whether old or current tear, initial encounter S83.232A 836.0       Patient Active Problem List   Diagnosis   • Essential hypertension, benign   • Esophageal reflux   • Hyperlipidemia   • IBS (irritable bowel syndrome)   • Sinus bradycardia        Past Medical History:   Diagnosis Date   • Acute bronchitis    • Acute pharyngitis    • Conjunctivitis    • Hyperlipidemia    • Hypertension    • IBS (irritable bowel syndrome)    • Lumbago    • Rhinitis         Past Surgical History:   Procedure Laterality Date   • EYE SURGERY Bilateral     multiple on both eyes   • HERNIA REPAIR Right     x2   • KNEE SURGERY Left     arthroscopy with meniscal repair- 10 years ago       PT Ortho     Row Name 06/10/19 1300       Left Lower Ext    Lt Knee Extension/Flexion AROM  0-135  -CR      User Key  (r) = Recorded By, (t) = Taken By, (c) = Cosigned By    Initials Name Provider Type    Jaime Robertson, PT Physical Therapist                      PT Assessment/Plan     Row Name 06/10/19 1410          PT Assessment    Assessment Comments  Cient demonstrates progression of AROM as well as improved gait mechaincs with ambulation.  He is progressing well and has been educatd on importance of ongoing ice management for effusion and ongoing maintenance of therapy progress.   -CR        PT Plan    PT Plan Comments  Cont with POC.    -CR       User Key  (r) = Recorded By, (t) = Taken By, (c) = Cosigned By    Initials Name Provider Type    Jaime Robertson, PT Physical Therapist          Modalities     Row Name 06/10/19 1300             Ice    Ice Applied  Yes  -CR      Location  left knee  -CR      Rx Minutes  10 mins  -CR      Ice S/P Rx  Yes  -CR         User Key  (r) = Recorded By, (t) = Taken By, (c) = Cosigned By    Initials Name Provider Type    Jaime Robertson PT Physical Therapist        Exercises     Row Name 06/10/19 1300             Subjective Comments    Subjective Comments  Client reports he has been doing well.  Everything is feeling good and he has been compliant with icing and exercises.   -CR         Subjective Pain    Able to rate subjective pain?  yes  -CR      Pre-Treatment Pain Level  0  -CR      Post-Treatment Pain Level  0  -CR         Total Minutes    03767 - PT Therapeutic Exercise Minutes  30  -CR      45960 - PT Manual Therapy Minutes  5  -CR         Exercise 1    Exercise Name 1  recumbent bike warmup  -CR      Time 1  5  -CR         Exercise 2    Exercise Name 2  quad setting  -CR      Sets 2  3  -CR      Reps 2  10  -CR      Additional Comments  5 second holds  -CR         Exercise 3    Exercise Name 3  SLR 3 way  -CR      Sets 3  3  -CR      Reps 3  10  -CR         Exercise 4    Exercise Name 4  SAQ  -CR      Reps 4  3  -CR      Time 4  10  -CR         Exercise 5    Exercise Name 5  Hamstring stretching  -CR      Reps 5  5  -CR      Time 5  10  -CR        User Key  (r) = Recorded By, (t) = Taken By, (c) = Cosigned By    Initials Name Provider Type    Jaime Robertson, MAURICIO Physical Therapist                      Manual Rx (last 36 hours)      Manual Treatments     Row Name 06/10/19 1300             Total Minutes    88757 - PT Manual Therapy Minutes  5  -CR         Manual Rx 1    Manual Rx 1 Location  left knee  -CR      Manual Rx 1 Type  patellar mobilization  -CR        User Key  (r) = Recorded By, (t) = Taken By, (c) = Cosigned By    Initials Name Provider Type    Jaime Robertson PT Physical Therapist                             Time Calculation:   Start Time: 1335  Therapy Charges for Today     Code Description Service Date Service Provider Modifiers Qty    52616079570  PT THER PROC EA 15 MIN 6/10/2019 Laina  Jaime, PT GP 2                    Jaime Marina, PT  6/10/2019

## 2019-06-12 ENCOUNTER — HOSPITAL ENCOUNTER (OUTPATIENT)
Dept: PHYSICAL THERAPY | Facility: HOSPITAL | Age: 64
Setting detail: THERAPIES SERIES
Discharge: HOME | End: 2019-06-12

## 2019-06-12 DIAGNOSIS — S83.232A COMPLEX TEAR OF MEDIAL MENISCUS OF LEFT KNEE, UNSPECIFIED WHETHER OLD OR CURRENT TEAR, INITIAL ENCOUNTER: Primary | ICD-10-CM

## 2019-06-12 PROCEDURE — 97110 THERAPEUTIC EXERCISES: CPT | Performed by: PHYSICAL THERAPIST

## 2019-06-12 NOTE — THERAPY TREATMENT NOTE
Outpatient Physical Therapy Ortho Treatment Note  [facility]     Patient Name: Chava Santiago  : 1955  MRN: [id number]  Today's Date: 2019      Visit Date: 2019    Visit Dx:    ICD-10-CM ICD-9-CM   1. Complex tear of medial meniscus of left knee, unspecified whether old or current tear, initial encounter S83.232A 836.0       Patient Active Problem List   Diagnosis   • Essential hypertension, benign   • Esophageal reflux   • Hyperlipidemia   • IBS (irritable bowel syndrome)   • Sinus bradycardia        Past Medical History:   Diagnosis Date   • Acute bronchitis    • Acute pharyngitis    • Conjunctivitis    • Hyperlipidemia    • Hypertension    • IBS (irritable bowel syndrome)    • Lumbago    • Rhinitis         Past Surgical History:   Procedure Laterality Date   • EYE SURGERY Bilateral     multiple on both eyes   • HERNIA REPAIR Right     x2   • KNEE SURGERY Left     arthroscopy with meniscal repair- 10 years ago       PT Ortho     Row Name 06/10/19 1300       Left Lower Ext    Lt Knee Extension/Flexion AROM  0-135  -CR      User Key  (r) = Recorded By, (t) = Taken By, (c) = Cosigned By    Initials Name Provider Type    Jaime Robertson, PT Physical Therapist                      PT Assessment/Plan     Row Name 19 1449          PT Assessment    Assessment Comments  Client tolerates progression well today.  therapist educates on importance of ongoing ice/elevation for effusion management.  Client will be followed 2 visits next week for ongoing care.    -CR        PT Plan    PT Plan Comments  cont with POC.   -CR       User Key  (r) = Recorded By, (t) = Taken By, (c) = Cosigned By    Initials Name Provider Type    Jaime Robertson, PT Physical Therapist          Modalities     Row Name 19 1400             Ice    Ice Applied  Yes  -CR      Location  left knee  -CR      Rx Minutes  10 mins  -CR      Ice S/P Rx  Yes  -CR        User Key  (r) = Recorded By, (t) = Taken By, (c)  = Cosigned By    Initials Name Provider Type    Jaime Robertson, PT Physical Therapist        Exercises     Row Name 06/12/19 1400             Subjective Comments    Subjective Comments  Client reports feelingn well.  Denies using ice as part of HEP   -CR         Subjective Pain    Able to rate subjective pain?  yes  -CR      Pre-Treatment Pain Level  0  -CR      Post-Treatment Pain Level  0  -CR         Total Minutes    73304 - PT Therapeutic Exercise Minutes  30  -CR         Exercise 1    Exercise Name 1  recumbent bike warmup  -CR      Time 1  6  -CR         Exercise 2    Exercise Name 2  quad setting  -CR      Sets 2  3  -CR      Reps 2  10  -CR         Exercise 3    Exercise Name 3  SLR 3 way  -CR      Sets 3  3  -CR      Reps 3  10  -CR      Additional Comments  3#  -CR         Exercise 4    Exercise Name 4  SAQ  -CR      Reps 4  2  -CR      Time 4  10  -CR      Additional Comments  3#  -CR         Exercise 6    Exercise Name 6  bridging  -CR      Sets 6  2  -CR      Reps 6  10  -CR         Exercise 7    Exercise Name 7  standing TKE  -CR      Sets 7  3  -CR      Reps 7  10  -CR      Additional Comments  green band  -CR        User Key  (r) = Recorded By, (t) = Taken By, (c) = Cosigned By    Initials Name Provider Type    Jaime Robertson, PT Physical Therapist                                          Time Calculation:   Start Time: 1420  Therapy Charges for Today     Code Description Service Date Service Provider Modifiers Qty    [id number]  PT THER PROC EA 15 MIN 6/12/2019 Jaime Marina, PT GP 2                    Jaime Marina, PT  6/12/2019

## 2019-06-17 ENCOUNTER — HOSPITAL ENCOUNTER (OUTPATIENT)
Dept: PHYSICAL THERAPY | Facility: HOSPITAL | Age: 64
Setting detail: THERAPIES SERIES
Discharge: HOME | End: 2019-06-17

## 2019-06-17 DIAGNOSIS — S83.232A COMPLEX TEAR OF MEDIAL MENISCUS OF LEFT KNEE, UNSPECIFIED WHETHER OLD OR CURRENT TEAR, INITIAL ENCOUNTER: Primary | ICD-10-CM

## 2019-06-17 PROCEDURE — 97110 THERAPEUTIC EXERCISES: CPT | Performed by: PHYSICAL THERAPIST

## 2019-06-19 ENCOUNTER — HOSPITAL ENCOUNTER (OUTPATIENT)
Dept: PHYSICAL THERAPY | Facility: HOSPITAL | Age: 64
Setting detail: THERAPIES SERIES
Discharge: HOME | End: 2019-06-19

## 2019-06-19 DIAGNOSIS — S83.232A COMPLEX TEAR OF MEDIAL MENISCUS OF LEFT KNEE, UNSPECIFIED WHETHER OLD OR CURRENT TEAR, INITIAL ENCOUNTER: Primary | ICD-10-CM

## 2019-06-19 PROCEDURE — 97140 MANUAL THERAPY 1/> REGIONS: CPT | Performed by: PHYSICAL THERAPIST

## 2019-06-19 PROCEDURE — 97110 THERAPEUTIC EXERCISES: CPT | Performed by: PHYSICAL THERAPIST

## 2019-06-19 NOTE — THERAPY TREATMENT NOTE
Outpatient Physical Therapy Ortho Treatment Note   [facility]     Patient Name: Chava Santiago  : 1955  MRN: [id number]  Today's Date: 2019      Visit Date: 2019    Visit Dx:    ICD-10-CM ICD-9-CM   1. Complex tear of medial meniscus of left knee, unspecified whether old or current tear, initial encounter S83.232A 836.0       Patient Active Problem List   Diagnosis   • Essential hypertension, benign   • Esophageal reflux   • Hyperlipidemia   • IBS (irritable bowel syndrome)   • Sinus bradycardia        Past Medical History:   Diagnosis Date   • Acute bronchitis    • Acute pharyngitis    • Conjunctivitis    • Hyperlipidemia    • Hypertension    • IBS (irritable bowel syndrome)    • Lumbago    • Rhinitis         Past Surgical History:   Procedure Laterality Date   • EYE SURGERY Bilateral     multiple on both eyes   • HERNIA REPAIR Right     x2   • KNEE SURGERY Left     arthroscopy with meniscal repair- 10 years ago                       PT Assessment/Plan     Row Name 19 1549          PT Assessment    Assessment Comments  Client continues to tolerate strengthening well and will be following up with MD next week.  Anticipate continues progression in post operative protocol as currently demonstrates excellent prognosis.    -CR        PT Plan    PT Plan Comments  Cont with strength as tolerated  -CR       User Key  (r) = Recorded By, (t) = Taken By, (c) = Cosigned By    Initials Name Provider Type    Jaime Robertson, PT Physical Therapist          Modalities     Row Name 19 1500             Ice    Ice Applied  Yes  -CR      Location  left knee  -CR      Rx Minutes  Other: 7  -CR      Ice S/P Rx  Yes  -CR        User Key  (r) = Recorded By, (t) = Taken By, (c) = Cosigned By    Initials Name Provider Type    Jaime Robertson, PT Physical Therapist        Exercises     Row Name 19 1500             Subjective Comments    Subjective Comments  Denies pain   -CR          Subjective Pain    Able to rate subjective pain?  yes  -CR      Pre-Treatment Pain Level  0  -CR      Post-Treatment Pain Level  0  -CR         Total Minutes    94475 - PT Therapeutic Exercise Minutes  30  -CR      09248 - PT Manual Therapy Minutes  8  -CR         Exercise 1    Exercise Name 1  recumbent bike warmup  -CR      Time 1  6  -CR         Exercise 2    Exercise Name 2  quad setting  -CR      Sets 2  3  -CR      Reps 2  10  -CR         Exercise 3    Exercise Name 3  SLR 3 way  -CR      Sets 3  3  -CR      Reps 3  10  -CR         Exercise 4    Exercise Name 4  SAQ  -CR      Reps 4  3  -CR      Time 4  10  -CR         Exercise 5    Exercise Name 5  Hamstring stretching  -CR      Reps 5  5  -CR      Time 5  15  -CR         Exercise 8    Exercise Name 8  prone hip extension  -CR      Sets 8  3  -CR      Reps 8  10  -CR         Exercise 9    Exercise Name 9  standing heel rasises  -CR      Reps 9  30  -CR         Exercise 10    Exercise Name 10  side stepping with t band   -CR      Sets 10  2   -CR      Reps 10  10   -CR      Additional Comments  yellow  -CR        User Key  (r) = Recorded By, (t) = Taken By, (c) = Cosigned By    Initials Name Provider Type    CR Jaime Marina, PT Physical Therapist                      Manual Rx (last 36 hours)      Manual Treatments     Row Name 06/19/19 1500             Total Minutes    53333 - PT Manual Therapy Minutes  8  -CR         Manual Rx 1    Manual Rx 1 Location  left knee  -CR      Manual Rx 1 Type  patellar mobilization, soft tissue quad work   -CR        User Key  (r) = Recorded By, (t) = Taken By, (c) = Cosigned By    Initials Name Provider Type    CR Jaime Marina PT Physical Therapist                             Time Calculation:   Start Time: 1515  Therapy Charges for Today     Code Description Service Date Service Provider Modifiers Qty    76539006085  PT THER PROC EA 15 MIN 6/19/2019 Jaime Marina, PT GP 2    [id number]  PT MANUAL  THERAPY EA 15 MIN 6/19/2019 Jaime Marina, PT GP 1                    Jaime Marina, PT  6/19/2019

## 2019-06-24 ENCOUNTER — OFFICE VISIT (OUTPATIENT)
Dept: ORTHOPEDIC SURGERY | Facility: CLINIC | Age: 64
End: 2019-06-24

## 2019-06-24 DIAGNOSIS — M17.12 PRIMARY OSTEOARTHRITIS OF LEFT KNEE: ICD-10-CM

## 2019-06-24 DIAGNOSIS — Z98.890 STATUS POST ARTHROSCOPY OF LEFT KNEE: Primary | ICD-10-CM

## 2019-06-24 PROCEDURE — 99024 POSTOP FOLLOW-UP VISIT: CPT | Performed by: ORTHOPAEDIC SURGERY

## 2019-06-24 NOTE — PROGRESS NOTES
Chief Complaint   Patient presents with   • Post-op     3 week f/y; 4 weeks status post  Left Knee Arthroscopy with Partial Medial Meniscectomy 05/28/2019           HPI    He is doing better follow-up left knee scope with partial meniscectomy.    There were no vitals filed for this visit.      Physical Exam:    His knee has trace swelling and effusion.  Negative Homans sign.  Neurovascular intact.      ICD-10-CM ICD-9-CM   1. Status post arthroscopy of left knee Z98.890 V45.89   2. Primary osteoarthritis of left knee M17.12 715.16       Orders Placed This Encounter   Procedures   • Ambulatory Referral to Physical Therapy Evaluate and treat, Ortho     Will complete a course of physical therapy and return to work July 15 full duty without restriction.

## 2019-06-25 ENCOUNTER — HOSPITAL ENCOUNTER (OUTPATIENT)
Dept: PHYSICAL THERAPY | Facility: HOSPITAL | Age: 64
Setting detail: THERAPIES SERIES
Discharge: HOME | End: 2019-06-25

## 2019-06-25 DIAGNOSIS — S83.232A COMPLEX TEAR OF MEDIAL MENISCUS OF LEFT KNEE, UNSPECIFIED WHETHER OLD OR CURRENT TEAR, INITIAL ENCOUNTER: Primary | ICD-10-CM

## 2019-06-25 PROCEDURE — 97110 THERAPEUTIC EXERCISES: CPT | Performed by: PHYSICAL THERAPIST

## 2019-06-25 NOTE — THERAPY TREATMENT NOTE
Outpatient Physical Therapy Ortho Treatment Note  [facility]     Patient Name: Chava Santiago  : 1955  MRN: [id number]  Today's Date: 2019      Visit Date: 2019    Visit Dx:    ICD-10-CM ICD-9-CM   1. Complex tear of medial meniscus of left knee, unspecified whether old or current tear, initial encounter S83.232A 836.0       Patient Active Problem List   Diagnosis   • Essential hypertension, benign   • Esophageal reflux   • Hyperlipidemia   • IBS (irritable bowel syndrome)   • Sinus bradycardia        Past Medical History:   Diagnosis Date   • Acute bronchitis    • Acute pharyngitis    • Conjunctivitis    • Hyperlipidemia    • Hypertension    • IBS (irritable bowel syndrome)    • Lumbago    • Rhinitis         Past Surgical History:   Procedure Laterality Date   • EYE SURGERY Bilateral     multiple on both eyes   • HERNIA REPAIR Right     x2   • KNEE SURGERY Left     arthroscopy with meniscal repair- 10 years ago                       PT Assessment/Plan     Row Name 19 1414          PT Assessment    Assessment Comments  Kanu has been cleared by MD for RTW 7/15.  He continues to progress through strength program and will continue to benefit from HEP.  Anticipate client will transition to HEP  with return to work. in 2 weeks.   -CR        PT Plan    PT Plan Comments  Cont with POC.   -CR       User Key  (r) = Recorded By, (t) = Taken By, (c) = Cosigned By    Initials Name Provider Type    Jaime Robertson, PT Physical Therapist            Exercises     Row Name 19 1300             Subjective Comments    Subjective Comments  Reports follow up with MD went well.  Cleared for full duty starting 7/15/19.   -CR         Subjective Pain    Able to rate subjective pain?  yes  -CR      Pre-Treatment Pain Level  2  -CR         Total Minutes    11486 - PT Therapeutic Exercise Minutes  30  -CR         Exercise 1    Exercise Name 1  recumbent bike warmup  -CR      Time 1  6  -CR          Exercise 2    Exercise Name 2  sit to stand with 10# kb  -CR      Sets 2  2  -CR      Reps 2  10  -CR         Exercise 3    Exercise Name 3  SLR 3 way  -CR      Sets 3  3  -CR      Reps 3  10  -CR      Additional Comments  3#  -CR         Exercise 4    Exercise Name 4  SAQ  -CR      Reps 4  3  -CR      Time 4  10  -CR      Additional Comments  5#  -CR         Exercise 5    Exercise Name 5  leg press   -CR      Sets 5  3  -CR      Reps 5  10  -CR      Additional Comments  80#  -CR         Exercise 6    Exercise Name 6  standing hip abduction  -CR      Sets 6  2  -CR      Reps 6  10  -CR      Additional Comments  red  -CR        User Key  (r) = Recorded By, (t) = Taken By, (c) = Cosigned By    Initials Name Provider Type    CR Jaime Marina, PT Physical Therapist                                          Time Calculation:   Start Time: 1345  Therapy Charges for Today     Code Description Service Date Service Provider Modifiers Qty    [id number]  PT THER PROC EA 15 MIN 6/25/2019 Jaime Marina, PT GP 2                    Jaime Marina PT  6/25/2019

## 2019-07-02 ENCOUNTER — HOSPITAL ENCOUNTER (OUTPATIENT)
Dept: PHYSICAL THERAPY | Facility: HOSPITAL | Age: 64
Setting detail: THERAPIES SERIES
Discharge: HOME | End: 2019-07-02

## 2019-07-02 DIAGNOSIS — S83.232A COMPLEX TEAR OF MEDIAL MENISCUS OF LEFT KNEE, UNSPECIFIED WHETHER OLD OR CURRENT TEAR, INITIAL ENCOUNTER: Primary | ICD-10-CM

## 2019-07-02 PROCEDURE — 97110 THERAPEUTIC EXERCISES: CPT | Performed by: PHYSICAL THERAPIST

## 2019-07-02 NOTE — THERAPY TREATMENT NOTE
Outpatient Physical Therapy Ortho Treatment Note   [facility]     Patient Name: Chava Santiago  : 1955  MRN: [id number]  Today's Date: 2019      Visit Date: 2019    Visit Dx:    ICD-10-CM ICD-9-CM   1. Complex tear of medial meniscus of left knee, unspecified whether old or current tear, initial encounter S83.232A 836.0       Patient Active Problem List   Diagnosis   • Essential hypertension, benign   • Esophageal reflux   • Hyperlipidemia   • IBS (irritable bowel syndrome)   • Sinus bradycardia        Past Medical History:   Diagnosis Date   • Acute bronchitis    • Acute pharyngitis    • Conjunctivitis    • Hyperlipidemia    • Hypertension    • IBS (irritable bowel syndrome)    • Lumbago    • Rhinitis         Past Surgical History:   Procedure Laterality Date   • EYE SURGERY Bilateral     multiple on both eyes   • HERNIA REPAIR Right     x2   • KNEE SURGERY Left     arthroscopy with meniscal repair- 10 years ago                       PT Assessment/Plan     Row Name 19 1616          PT Assessment    Assessment Comments  Client with WNL AROM, significant improvement in LEFS, and ongoing strength gains.  He has demonstrated progressive strides towards independence with HEP and will likely be appropriate for discharge to HEP within next 1-2 visits .   -CR        PT Plan    PT Plan Comments  Cont with POC.  Discharge to HEP within 1-2 visits  -CR       User Key  (r) = Recorded By, (t) = Taken By, (c) = Cosigned By    Initials Name Provider Type    Jaime Robertson, PT Physical Therapist            Exercises     Row Name 19 1500             Subjective Comments    Subjective Comments  Reports he continues to feel well  -CR         Subjective Pain    Able to rate subjective pain?  yes  -CR      Pre-Treatment Pain Level  2  -CR      Post-Treatment Pain Level  2  -CR         Total Minutes    90925 - PT Therapeutic Exercise Minutes  35  -CR         Exercise 1    Exercise Name 1   recumbent bike warmup  -CR      Time 1  6  -CR         Exercise 2    Exercise Name 2  sit to stand with 10# kb  -CR      Sets 2  2  -CR      Reps 2  10  -CR         Exercise 3    Exercise Name 3  SLR 3 way  -CR      Sets 3  3  -CR      Reps 3  10  -CR      Additional Comments  4#  -CR         Exercise 4    Exercise Name 4  LAQ  -CR      Reps 4  3  -CR      Time 4  10  -CR      Additional Comments  4#  -CR         Exercise 5    Exercise Name 5  leg press   -CR      Sets 5  3  -CR      Reps 5  10  -CR      Time 5  15  -CR         Exercise 6    Exercise Name 6  standing hip abduction  -CR      Sets 6  3  -CR      Reps 6  10  -CR      Additional Comments  red  -CR         Exercise 7    Exercise Name 7  HS stretching   -CR      Reps 7  5  -CR      Time 7  20  -CR         Exercise 9    Exercise Name 9  standing heel rasises  -CR      Reps 9  30  -CR        User Key  (r) = Recorded By, (t) = Taken By, (c) = Cosigned By    Initials Name Provider Type    Jaime Robertson, PT Physical Therapist                                Outcome Measure Options: Lower Extremity Functional Scale (LEFS)  Lower Extremity Functional Index  Any of your usual work, housework or school activities: A little bit of difficulty  Your usual hobbies, recreational or sporting activities: No difficulty  Getting into or out of the bath: No difficulty  Walking between rooms: No difficulty  Putting on your shoes or socks: No difficulty  Squatting: No difficulty  Lifting an object, like a bag of groceries from the floor: No difficulty  Performing light activities around your home: No difficulty  Performing heavy activities around your home: A little bit of difficulty  Getting into or out of a car: A little bit of difficulty  Walking 2 blocks: A little bit of difficulty  Walking a mile: Quite a bit of difficulty  Going up or down 10 stairs (about 1 flight of stairs): A little bit of difficulty  Standing for 1 hour: A little bit of difficulty  Sitting  for 1 hour: No difficulty  Running on even ground: Extreme difficulty or unable to perform activity  Running on uneven ground: Extreme difficulty or unable to perform activity  Making sharp turns while running fast: Extreme difficulty or unable to perform activity  Hopping: Moderate difficulty  Rolling over in bed: No difficulty  Total: 57      Time Calculation:   Start Time: 1540  Therapy Charges for Today     Code Description Service Date Service Provider Modifiers Qty    76109892905 HC PT THER PROC EA 15 MIN 7/2/2019 Jaime Marina, PT GP 2          PT G-Codes  Outcome Measure Options: Lower Extremity Functional Scale (LEFS)  Total: 57         Jaime Marina, PT  7/2/2019

## 2019-07-11 ENCOUNTER — HOSPITAL ENCOUNTER (OUTPATIENT)
Dept: PHYSICAL THERAPY | Facility: HOSPITAL | Age: 64
Setting detail: THERAPIES SERIES
Discharge: HOME | End: 2019-07-11

## 2019-07-11 DIAGNOSIS — S83.232A COMPLEX TEAR OF MEDIAL MENISCUS OF LEFT KNEE, UNSPECIFIED WHETHER OLD OR CURRENT TEAR, INITIAL ENCOUNTER: Primary | ICD-10-CM

## 2019-07-11 PROCEDURE — 97110 THERAPEUTIC EXERCISES: CPT | Performed by: PHYSICAL THERAPIST

## 2019-07-11 NOTE — THERAPY PROGRESS REPORT/RE-CERT
Outpatient Physical Therapy Ortho Progress Note   [facility]     Patient Name: Chava Santiago  : 1955  MRN: [id number]  Today's Date: 2019      Visit Date: 2019    Visit Dx:    ICD-10-CM ICD-9-CM   1. Complex tear of medial meniscus of left knee, unspecified whether old or current tear, initial encounter S83.232A 836.0       Patient Active Problem List   Diagnosis   • Essential hypertension, benign   • Esophageal reflux   • Hyperlipidemia   • IBS (irritable bowel syndrome)   • Sinus bradycardia        Past Medical History:   Diagnosis Date   • Acute bronchitis    • Acute pharyngitis    • Conjunctivitis    • Hyperlipidemia    • Hypertension    • IBS (irritable bowel syndrome)    • Lumbago    • Rhinitis         Past Surgical History:   Procedure Laterality Date   • EYE SURGERY Bilateral     multiple on both eyes   • HERNIA REPAIR Right     x2   • KNEE SURGERY Left     arthroscopy with meniscal repair- 10 years ago       PT Ortho     Row Name 19 1500       Subjective Pain    Pre-Treatment Pain Level  0  -CR    Post-Treatment Pain Level  0  -CR       Knee Palpation    Medial Condyle  Tender  -CR       Right Lower Ext    Rt Knee Extension/Flexion AROM  0-135  -CR       Flexibility    Flexibility Tested?  Lower Extremity  -CR       Lower Extremity Flexibility    Hamstrings  Mildly limited  -CR    Quadriceps  Mildly limited  -CR      User Key  (r) = Recorded By, (t) = Taken By, (c) = Cosigned By    Initials Name Provider Type    Jaime Robertson PT Physical Therapist                      PT Assessment/Plan     Row Name 19 1546          PT Assessment    Functional Limitations  Impaired locomotion;Limitation in home management;Performance in leisure activities;Performance in self-care ADL;Performance in work activities  -CR     Impairments  Pain;Muscle strength;Motor function;Range of motion;Joint mobility  -CR     Assessment Comments  Client has progressed well with treatment  interventions for rehab of knee menisectomy.  He has demontrated meeting 75% of goals and is planning on return to work next week.  CLlient will be folloowed 1 visit next week and will likely transition to HEP next session  -CR        PT Plan    PT Frequency  1x/week  -CR     PT Plan Comments  Establish independence with HEP. transition to HEP   -CR       User Key  (r) = Recorded By, (t) = Taken By, (c) = Cosigned By    Initials Name Provider Type    Jaime Robertson, PT Physical Therapist            Exercises     Row Name 07/11/19 1500             Subjective Comments    Subjective Comments  Reports plan to return to work next week.    -CR         Subjective Pain    Able to rate subjective pain?  yes  -CR      Pre-Treatment Pain Level  0  -CR      Post-Treatment Pain Level  0  -CR         Total Minutes    50630 - PT Therapeutic Exercise Minutes  35  -CR         Exercise 1    Exercise Name 1  recumbent bike warmup  -CR      Time 1  6  -CR         Exercise 3    Exercise Name 3  SLR 3 way  -CR      Sets 3  3  -CR      Reps 3  10  -CR      Additional Comments  5#  -CR         Exercise 4    Exercise Name 4  LAQ  -CR      Reps 4  3  -CR      Time 4  10  -CR      Additional Comments  5#  -CR         Exercise 7    Exercise Name 7  HS stretching   -CR      Reps 7  5  -CR      Time 7  20  -CR         Exercise 8    Exercise Name 8  Quad and IT band stretching  -CR      Reps 8  3  -CR      Time 8  30  -CR         Exercise 9    Exercise Name 9  standing heel rasises  -CR      Reps 9  30  -CR        User Key  (r) = Recorded By, (t) = Taken By, (c) = Cosigned By    Initials Name Provider Type    Jaime Robertson, PT Physical Therapist                       PT OP Goals     Row Name 07/11/19 1500          PT Short Term Goals    STG Date to Achieve  07/03/19  -CR     STG 1  client will be independent with initial HEP  -CR     STG 1 Progress  Met  -CR     STG 2  client will demonstrate full AROM left knee  -CR     STG 2  Progress  Met  -CR     STG 3  averyn will demonstrate 4+/5 strength left knee flexion and extension  -CR     STG 3 Progress  Met  -CR        Long Term Goals    LTG Date to Achieve  07/31/19  -CR     LTG 1  client will return to work without pain  -CR     LTG 1 Progress  Ongoing  -CR     LTG 2  client will demonstrate non antalgic gait  -CR     LTG 2 Progress  Ongoing  -CR     LTG 3  client will report LEFS increase by 10 points   -CR     LTG 3 Progress  Met  -CR       User Key  (r) = Recorded By, (t) = Taken By, (c) = Cosigned By    Initials Name Provider Type    Jaime Robertson, PT Physical Therapist               Outcome Measure Options: Lower Extremity Functional Scale (LEFS)  Lower Extremity Functional Index  Any of your usual work, housework or school activities: A little bit of difficulty  Your usual hobbies, recreational or sporting activities: Moderate difficulty  Getting into or out of the bath: No difficulty  Walking between rooms: No difficulty  Putting on your shoes or socks: No difficulty  Squatting: No difficulty  Lifting an object, like a bag of groceries from the floor: No difficulty  Performing light activities around your home: No difficulty  Performing heavy activities around your home: Moderate difficulty  Getting into or out of a car: No difficulty  Walking 2 blocks: A little bit of difficulty  Walking a mile: Quite a bit of difficulty  Going up or down 10 stairs (about 1 flight of stairs): A little bit of difficulty  Standing for 1 hour: A little bit of difficulty  Sitting for 1 hour: No difficulty  Running on even ground: Moderate difficulty  Running on uneven ground: Quite a bit of difficulty  Making sharp turns while running fast: Quite a bit of difficulty  Hopping: Moderate difficulty  Rolling over in bed: No difficulty  Total: 59      Time Calculation:   Start Time: 1525  Therapy Charges for Today     Code Description Service Date Service Provider Modifiers Qty    06522873060  PT  THER PROC EA 15 MIN 7/11/2019 Jaime Marina, PT GP 2          PT G-Codes  Outcome Measure Options: Lower Extremity Functional Scale (LEFS)  Total: 59         Jaime Marina, PT  7/11/2019

## 2019-07-18 ENCOUNTER — HOSPITAL ENCOUNTER (OUTPATIENT)
Dept: PHYSICAL THERAPY | Facility: HOSPITAL | Age: 64
Setting detail: THERAPIES SERIES
Discharge: HOME | End: 2019-07-18

## 2019-07-18 DIAGNOSIS — S83.232A COMPLEX TEAR OF MEDIAL MENISCUS OF LEFT KNEE, UNSPECIFIED WHETHER OLD OR CURRENT TEAR, INITIAL ENCOUNTER: Primary | ICD-10-CM

## 2019-07-18 PROCEDURE — 97110 THERAPEUTIC EXERCISES: CPT | Performed by: PHYSICAL THERAPIST

## 2019-07-18 NOTE — THERAPY DISCHARGE NOTE
Outpatient Physical Therapy Ortho Treatment Note/Discharge Summary  [facility]     Patient Name: Chava Santiago  : 1955  MRN: [id number]  Today's Date: 2019      Visit Date: 2019    Visit Dx:    ICD-10-CM ICD-9-CM   1. Complex tear of medial meniscus of left knee, unspecified whether old or current tear, initial encounter S83.232A 836.0       Patient Active Problem List   Diagnosis   • Essential hypertension, benign   • Esophageal reflux   • Hyperlipidemia   • IBS (irritable bowel syndrome)   • Sinus bradycardia        Past Medical History:   Diagnosis Date   • Acute bronchitis    • Acute pharyngitis    • Conjunctivitis    • Hyperlipidemia    • Hypertension    • IBS (irritable bowel syndrome)    • Lumbago    • Rhinitis         Past Surgical History:   Procedure Laterality Date   • EYE SURGERY Bilateral     multiple on both eyes   • HERNIA REPAIR Right     x2   • KNEE SURGERY Left     arthroscopy with meniscal repair- 10 years ago       PT Ortho     Row Name 19 1500       Knee Palpation    Knee Palpation?  No Tenderness/Abnormality  -CR       Patellar Accessory Motions    Superior glide  WNL  -CR    Inferior glide  WNL  -CR    Medial glide  WNL  -CR    Lateral glide  WNL  -CR       Right Lower Ext    Rt Knee Extension/Flexion AROM  0-135  -CR       Flexibility    Flexibility Tested?  Lower Extremity  -CR       Gait/Stairs Assessment/Training    Wolfe Level (Gait)  independent  -CR      User Key  (r) = Recorded By, (t) = Taken By, (c) = Cosigned By    Initials Name Provider Type    Jaime Robertson, PT Physical Therapist                      PT Assessment/Plan     Row Name 19 1600 19 1542       PT Assessment    Assessment Comments  --  Client has currently progressed through rehab protocol and has met all treatment goals set at .  He has demonstrated ability to complete TE independently and is appropriate for discharge to [facility] at this time.  Client verbally  agrees with POC.    -CR       PT Plan    PT Plan Comments  discharge to HEP  -CR  --      User Key  (r) = Recorded By, (t) = Taken By, (c) = Cosigned By    Initials Name Provider Type    Jaime Robertson, MAURICIO Physical Therapist              Exercises     Row Name 07/18/19 1500             Subjective Comments    Subjective Comments  Yaa reports he has returned to work.    -CR         Subjective Pain    Able to rate subjective pain?  yes  -CR      Pre-Treatment Pain Level  0  -CR      Post-Treatment Pain Level  0  -CR         Total Minutes    60113 - PT Therapeutic Exercise Minutes  35  -CR         Exercise 1    Exercise Name 1  recumbent bike warmup  -CR      Time 1  6  -CR         Exercise 2    Exercise Name 2  Re assessemnt/discharge process included in TE  -CR      Additional Comments  15  -CR         Exercise 4    Exercise Name 4  LAQ  -CR      Reps 4  3  -CR      Time 4  10  -CR      Additional Comments  5#  -CR         Exercise 5    Exercise Name 5  leg press   -CR      Sets 5  3  -CR      Reps 5  10  -CR      Additional Comments  100#  -CR        User Key  (r) = Recorded By, (t) = Taken By, (c) = Cosigned By    Initials Name Provider Type    Jaime Robertson, PT Physical Therapist                         PT OP Goals     Row Name 07/18/19 1500          PT Short Term Goals    STG Date to Achieve  07/03/19  -CR     STG 1  client will be independent with initial HEP  -CR     STG 1 Progress  Met  -CR     STG 2  client will demonstrate full AROM left knee  -CR     STG 2 Progress  Met  -CR     STG 3  clietn will demonstrate 4+/5 strength left knee flexion and extension  -CR     STG 3 Progress  Met  -CR        Long Term Goals    LTG Date to Achieve  07/31/19  -CR     LTG 1  client will return to work without pain  -CR     LTG 1 Progress  Met  -CR     LTG 2  client will demonstrate non antalgic gait  -CR     LTG 2 Progress  Met  -CR     LTG 3  client will report LEFS increase by 10 points   -CR     LTG 3  Progress  Met  -CR       User Key  (r) = Recorded By, (t) = Taken By, (c) = Cosigned By    Initials Name Provider Type    Jaime Robertson, PT Physical Therapist          Therapy Education  Education Details: Client provided written D/C HEp  Given: HEP  How Provided: Verbal, Demonstration, Written  Provided to: Patient  Level of Understanding: Verbalized, Demonstrated    Outcome Measure Options: Lower Extremity Functional Scale (LEFS)  Lower Extremity Functional Index  Any of your usual work, housework or school activities: No difficulty  Your usual hobbies, recreational or sporting activities: No difficulty  Getting into or out of the bath: No difficulty  Walking between rooms: A little bit of difficulty  Putting on your shoes or socks: No difficulty  Squatting: No difficulty  Lifting an object, like a bag of groceries from the floor: No difficulty  Performing light activities around your home: No difficulty  Performing heavy activities around your home: A little bit of difficulty  Getting into or out of a car: No difficulty  Walking 2 blocks: A little bit of difficulty  Walking a mile: Moderate difficulty  Going up or down 10 stairs (about 1 flight of stairs): No difficulty  Standing for 1 hour: No difficulty  Sitting for 1 hour: No difficulty  Running on even ground: Moderate difficulty  Running on uneven ground: Quite a bit of difficulty  Making sharp turns while running fast: Quite a bit of difficulty  Hopping: A little bit of difficulty  Rolling over in bed: No difficulty  Total: 66      Time Calculation:   Start Time: 1525  Therapy Charges for Today     Code Description Service Date Service Provider Modifiers Qty    [id number] HC PT THER PROC EA 15 MIN 7/18/2019 Jaime Marina, PT GP 2          PT G-Codes  Outcome Measure Options: Lower Extremity Functional Scale (LEFS)  Total: 66     OP PT Discharge Summary  Date of Discharge: 07/18/19  Reason for Discharge: All goals achieved  Outcomes Achieved:  Able to achieve all goals within established timeline  Discharge Destination: Home with home program      Jaime Marina, PT  7/18/2019

## 2019-08-26 RX ORDER — OMEPRAZOLE 40 MG/1
40 CAPSULE, DELAYED RELEASE ORAL DAILY
Qty: 30 CAPSULE | Refills: 11 | Status: SHIPPED | OUTPATIENT
Start: 2019-08-26 | End: 2020-05-14 | Stop reason: SDUPTHER

## 2019-11-13 ENCOUNTER — OFFICE VISIT (OUTPATIENT)
Dept: FAMILY MEDICINE CLINIC | Facility: CLINIC | Age: 64
End: 2019-11-13

## 2019-11-13 VITALS
SYSTOLIC BLOOD PRESSURE: 118 MMHG | WEIGHT: 217 LBS | TEMPERATURE: 98.5 F | HEIGHT: 69 IN | HEART RATE: 60 BPM | OXYGEN SATURATION: 98 % | DIASTOLIC BLOOD PRESSURE: 60 MMHG | BODY MASS INDEX: 32.14 KG/M2

## 2019-11-13 DIAGNOSIS — J40 BRONCHITIS: Primary | ICD-10-CM

## 2019-11-13 PROCEDURE — 99213 OFFICE O/P EST LOW 20 MIN: CPT | Performed by: FAMILY MEDICINE

## 2019-11-13 RX ORDER — PROMETHAZINE HYDROCHLORIDE AND CODEINE PHOSPHATE 6.25; 1 MG/5ML; MG/5ML
5 SYRUP ORAL EVERY 6 HOURS PRN
Qty: 180 ML | Refills: 0 | Status: SHIPPED | OUTPATIENT
Start: 2019-11-13 | End: 2020-05-14

## 2019-11-13 RX ORDER — PREDNISONE 20 MG/1
TABLET ORAL
Qty: 15 TABLET | Refills: 0 | Status: SHIPPED | OUTPATIENT
Start: 2019-11-13 | End: 2020-05-14

## 2019-11-13 RX ORDER — CEFDINIR 300 MG/1
300 CAPSULE ORAL 2 TIMES DAILY
Qty: 20 CAPSULE | Refills: 0 | Status: SHIPPED | OUTPATIENT
Start: 2019-11-13 | End: 2020-05-14

## 2019-11-14 NOTE — PROGRESS NOTES
Conrad Santiago is a 63 y.o. male    Chief Complaint    Chest congestion  Nonproductive cough  Fatigue    History of Present Illness  Patient presents today complaining of nasal congestion, postnasal drainage, chest congestion with nonproductive cough, generalized fatigue but no fever, chills, myalgias or arthralgias.  He has tried several over-the-counter medications without any relief.  He is currently afebrile with excellent oxygen saturation at 98%.    The following portions of the patient's history were reviewed and updated as appropriate: allergies, current medications, past social history and problem list    Review of Systems   Constitutional: Negative for chills, fatigue and fever.   HENT: Negative.    Respiratory: Positive for cough, chest tightness and wheezing. Negative for shortness of breath.    Cardiovascular: Negative for chest pain.   Gastrointestinal: Negative for nausea.       Objective     Vitals:    11/13/19 1042   BP: 118/60   Pulse: 60   Temp: 98.5 °F (36.9 °C)   SpO2: 98%       Physical Exam   Constitutional: He appears well-developed and well-nourished. No distress.   HENT:   Head: Normocephalic and atraumatic.   Nose: Nose normal.   Mouth/Throat: Oropharynx is clear and moist.   Neck: Neck supple. No JVD present.   Cardiovascular: Normal rate, regular rhythm and normal heart sounds.   No murmur heard.  Pulmonary/Chest: Effort normal. No stridor. No respiratory distress. He has wheezes.   Musculoskeletal: He exhibits no edema.   Lymphadenopathy:     He has no cervical adenopathy.   Skin: He is not diaphoretic.   Nursing note and vitals reviewed.      Assessment/Plan   Problem List Items Addressed This Visit     None      Visit Diagnoses     Bronchitis    -  Primary    Relevant Medications    cefdinir (OMNICEF) 300 MG capsule    predniSONE (DELTASONE) 20 MG tablet    promethazine-codeine (PHENERGAN with CODEINE) 6.25-10 MG/5ML syrup

## 2020-01-11 DIAGNOSIS — E78.2 MIXED HYPERLIPIDEMIA: ICD-10-CM

## 2020-01-16 RX ORDER — PRAVASTATIN SODIUM 40 MG
TABLET ORAL
Qty: 90 TABLET | Refills: 2 | OUTPATIENT
Start: 2020-01-16

## 2020-01-19 DIAGNOSIS — I10 ESSENTIAL HYPERTENSION, BENIGN: ICD-10-CM

## 2020-01-20 DIAGNOSIS — E78.2 MIXED HYPERLIPIDEMIA: ICD-10-CM

## 2020-01-20 DIAGNOSIS — K58.2 IRRITABLE BOWEL SYNDROME WITH BOTH CONSTIPATION AND DIARRHEA: ICD-10-CM

## 2020-01-20 DIAGNOSIS — I10 ESSENTIAL HYPERTENSION, BENIGN: ICD-10-CM

## 2020-01-20 DIAGNOSIS — M70.21 OLECRANON BURSITIS OF RIGHT ELBOW: ICD-10-CM

## 2020-01-20 RX ORDER — PRAVASTATIN SODIUM 40 MG
40 TABLET ORAL DAILY
Qty: 90 TABLET | Refills: 0 | Status: SHIPPED | OUTPATIENT
Start: 2020-01-20 | End: 2020-05-04 | Stop reason: SDUPTHER

## 2020-01-20 RX ORDER — MELOXICAM 15 MG/1
15 TABLET ORAL DAILY
Qty: 90 TABLET | Refills: 0 | Status: SHIPPED | OUTPATIENT
Start: 2020-01-20 | End: 2020-05-04 | Stop reason: SDUPTHER

## 2020-01-20 RX ORDER — HYDROCHLOROTHIAZIDE 25 MG/1
25 TABLET ORAL DAILY
Qty: 90 TABLET | Refills: 0 | Status: SHIPPED | OUTPATIENT
Start: 2020-01-20 | End: 2020-05-04 | Stop reason: SDUPTHER

## 2020-01-20 RX ORDER — DICYCLOMINE HCL 20 MG
TABLET ORAL
Qty: 60 TABLET | Refills: 11 | Status: SHIPPED | OUTPATIENT
Start: 2020-01-20 | End: 2021-02-03

## 2020-01-20 RX ORDER — LOSARTAN POTASSIUM 100 MG/1
TABLET ORAL
Qty: 90 TABLET | Refills: 2 | Status: SHIPPED | OUTPATIENT
Start: 2020-01-20 | End: 2020-01-20 | Stop reason: SDUPTHER

## 2020-01-20 RX ORDER — LOSARTAN POTASSIUM 100 MG/1
100 TABLET ORAL DAILY
Qty: 90 TABLET | Refills: 0 | Status: SHIPPED | OUTPATIENT
Start: 2020-01-20 | End: 2020-05-14 | Stop reason: SDUPTHER

## 2020-04-22 DIAGNOSIS — M70.21 OLECRANON BURSITIS OF RIGHT ELBOW: ICD-10-CM

## 2020-04-22 DIAGNOSIS — E78.2 MIXED HYPERLIPIDEMIA: ICD-10-CM

## 2020-04-26 DIAGNOSIS — I10 ESSENTIAL HYPERTENSION, BENIGN: ICD-10-CM

## 2020-04-28 RX ORDER — MELOXICAM 15 MG/1
TABLET ORAL
Qty: 90 TABLET | Refills: 0 | OUTPATIENT
Start: 2020-04-28

## 2020-04-28 RX ORDER — PRAVASTATIN SODIUM 40 MG
TABLET ORAL
Qty: 90 TABLET | Refills: 0 | OUTPATIENT
Start: 2020-04-28

## 2020-04-28 RX ORDER — HYDROCHLOROTHIAZIDE 25 MG/1
TABLET ORAL
Qty: 90 TABLET | Refills: 0 | OUTPATIENT
Start: 2020-04-28

## 2020-05-04 DIAGNOSIS — I10 ESSENTIAL HYPERTENSION, BENIGN: ICD-10-CM

## 2020-05-04 DIAGNOSIS — E78.2 MIXED HYPERLIPIDEMIA: ICD-10-CM

## 2020-05-04 DIAGNOSIS — M70.21 OLECRANON BURSITIS OF RIGHT ELBOW: ICD-10-CM

## 2020-05-04 RX ORDER — HYDROCHLOROTHIAZIDE 25 MG/1
25 TABLET ORAL DAILY
Qty: 90 TABLET | Refills: 0 | Status: SHIPPED | OUTPATIENT
Start: 2020-05-04 | End: 2020-05-14 | Stop reason: SDUPTHER

## 2020-05-04 RX ORDER — MELOXICAM 15 MG/1
15 TABLET ORAL DAILY
Qty: 90 TABLET | Refills: 0 | Status: SHIPPED | OUTPATIENT
Start: 2020-05-04 | End: 2020-05-14 | Stop reason: SDUPTHER

## 2020-05-04 RX ORDER — PRAVASTATIN SODIUM 40 MG
40 TABLET ORAL DAILY
Qty: 90 TABLET | Refills: 0 | Status: SHIPPED | OUTPATIENT
Start: 2020-05-04 | End: 2020-05-14 | Stop reason: SDUPTHER

## 2020-05-14 ENCOUNTER — OFFICE VISIT (OUTPATIENT)
Dept: FAMILY MEDICINE CLINIC | Facility: CLINIC | Age: 65
End: 2020-05-14

## 2020-05-14 VITALS
HEIGHT: 69 IN | RESPIRATION RATE: 16 BRPM | OXYGEN SATURATION: 96 % | TEMPERATURE: 97.3 F | DIASTOLIC BLOOD PRESSURE: 74 MMHG | WEIGHT: 223.4 LBS | BODY MASS INDEX: 33.09 KG/M2 | SYSTOLIC BLOOD PRESSURE: 128 MMHG | HEART RATE: 65 BPM

## 2020-05-14 DIAGNOSIS — I10 ESSENTIAL HYPERTENSION, BENIGN: Primary | ICD-10-CM

## 2020-05-14 DIAGNOSIS — Z12.5 PROSTATE CANCER SCREENING: ICD-10-CM

## 2020-05-14 DIAGNOSIS — M70.21 OLECRANON BURSITIS OF RIGHT ELBOW: ICD-10-CM

## 2020-05-14 DIAGNOSIS — K21.00 GASTROESOPHAGEAL REFLUX DISEASE WITH ESOPHAGITIS: ICD-10-CM

## 2020-05-14 DIAGNOSIS — E78.2 MIXED HYPERLIPIDEMIA: ICD-10-CM

## 2020-05-14 DIAGNOSIS — Z87.19 HISTORY OF ESOPHAGEAL STRICTURE: ICD-10-CM

## 2020-05-14 DIAGNOSIS — Z51.81 MEDICATION MONITORING ENCOUNTER: ICD-10-CM

## 2020-05-14 PROCEDURE — 99214 OFFICE O/P EST MOD 30 MIN: CPT | Performed by: FAMILY MEDICINE

## 2020-05-14 RX ORDER — MELOXICAM 15 MG/1
15 TABLET ORAL DAILY
Qty: 90 TABLET | Refills: 3 | Status: SHIPPED | OUTPATIENT
Start: 2020-05-14 | End: 2020-08-10 | Stop reason: SDUPTHER

## 2020-05-14 RX ORDER — MONTELUKAST SODIUM 4 MG/1
1 TABLET, CHEWABLE ORAL 2 TIMES DAILY PRN
Qty: 60 TABLET | Refills: 11 | Status: SHIPPED | OUTPATIENT
Start: 2020-05-14 | End: 2021-07-12 | Stop reason: SDUPTHER

## 2020-05-14 RX ORDER — OMEPRAZOLE 40 MG/1
40 CAPSULE, DELAYED RELEASE ORAL DAILY
Qty: 30 CAPSULE | Refills: 11 | Status: SHIPPED | OUTPATIENT
Start: 2020-05-14 | End: 2020-09-13 | Stop reason: SDUPTHER

## 2020-05-14 RX ORDER — PRAVASTATIN SODIUM 40 MG
40 TABLET ORAL DAILY
Qty: 90 TABLET | Refills: 3 | Status: SHIPPED | OUTPATIENT
Start: 2020-05-14 | End: 2020-08-02 | Stop reason: SDUPTHER

## 2020-05-14 RX ORDER — HYDROCHLOROTHIAZIDE 25 MG/1
25 TABLET ORAL DAILY
Qty: 90 TABLET | Refills: 3 | Status: SHIPPED | OUTPATIENT
Start: 2020-05-14 | End: 2020-08-02 | Stop reason: SDUPTHER

## 2020-05-14 RX ORDER — LOSARTAN POTASSIUM 100 MG/1
100 TABLET ORAL DAILY
Qty: 90 TABLET | Refills: 3 | Status: SHIPPED | OUTPATIENT
Start: 2020-05-14 | End: 2020-09-13 | Stop reason: SDUPTHER

## 2020-05-14 RX ORDER — SUCRALFATE ORAL 1 G/10ML
1 SUSPENSION ORAL 4 TIMES DAILY
Qty: 420 ML | Refills: 11 | Status: SHIPPED | OUTPATIENT
Start: 2020-05-14 | End: 2021-08-02

## 2020-05-15 NOTE — PROGRESS NOTES
Conrad Santiago is a 64 y.o. male    Chief Complaint    High blood pressure  High cholesterol  GERD with esophageal stricture  IBS  Osteoarthritis    History of Present Illness  Patient presents today for infrequent office visit for multiple medical problems as noted above including hypertension, hyperlipidemia, gastroesophageal reflux disease with history of esophageal strictures requiring dilatation, irritable bowel syndrome with diarrhea and generalized osteoarthritis.  He is due for refills on multiple medications.  He does not feel that his dicyclomine is helping with his diarrhea is much as it used to in the past.  It is also costly for him.  I recommended we try colestipol 1 or 2 tablets/day as needed.  Unsure whether this cost will be improved or not.  Patient continues to work on a regular basis with a swing shift noted.  He has significant and severe ptosis particularly of the left lid.  He comes in today with tape holding his left eyelid up so he can see.  He refuses consultation as he reports that he cannot afford any type of surgery.    The following portions of the patient's history were reviewed and updated as appropriate: allergies, current medications, past social history and problem list    Review of Systems   Constitutional: Negative for chills, fatigue, fever and unexpected weight change.   HENT: Positive for trouble swallowing. Negative for voice change.    Respiratory: Negative for cough, chest tightness, shortness of breath and wheezing.    Cardiovascular: Negative for chest pain, palpitations and leg swelling.   Gastrointestinal: Negative for abdominal pain, nausea and vomiting.   Endocrine: Negative for polydipsia, polyphagia and polyuria.   Genitourinary: Positive for difficulty urinating. Negative for dysuria, frequency, hematuria and urgency.   Musculoskeletal: Positive for arthralgias and myalgias. Negative for back pain.   Skin: Negative for color change and rash.    Neurological: Negative for dizziness, syncope, weakness and headaches.   Hematological: Negative for adenopathy. Does not bruise/bleed easily.   Psychiatric/Behavioral: Negative.        Objective     Vitals:    05/14/20 1533   BP: 128/74   Pulse: 65   Resp: 16   Temp: 97.3 °F (36.3 °C)   SpO2: 96%       Physical Exam   Constitutional: He is oriented to person, place, and time. He appears well-developed and well-nourished.   HENT:   Head: Normocephalic.   Mouth/Throat: Oropharynx is clear and moist.   Eyes: Pupils are equal, round, and reactive to light. Conjunctivae are normal.   Neck: Neck supple. No JVD present. No thyromegaly present.   Cardiovascular: Normal rate, regular rhythm, normal heart sounds, intact distal pulses and normal pulses.   No murmur heard.  Pulmonary/Chest: Effort normal and breath sounds normal. No respiratory distress.   Abdominal: Soft. Bowel sounds are normal. There is no hepatosplenomegaly. There is no tenderness.   Musculoskeletal: He exhibits tenderness. He exhibits no edema.   Lymphadenopathy:     He has no cervical adenopathy.   Neurological: He is alert and oriented to person, place, and time. No sensory deficit.   Skin: Skin is warm and dry. No rash noted. He is not diaphoretic.   Psychiatric: He has a normal mood and affect. His behavior is normal.   Nursing note and vitals reviewed.      Assessment/Plan   Problem List Items Addressed This Visit        Cardiovascular and Mediastinum    Essential hypertension, benign - Primary    Relevant Medications    hydroCHLOROthiazide (HYDRODIURIL) 25 MG tablet    losartan (COZAAR) 100 MG tablet    Other Relevant Orders    Comprehensive Metabolic Panel    Hyperlipidemia    Relevant Medications    pravastatin (PRAVACHOL) 40 MG tablet    colestipol (COLESTID) 1 g tablet    Other Relevant Orders    Comprehensive Metabolic Panel    Lipid Panel       Digestive    Esophageal reflux    Relevant Medications    omeprazole (priLOSEC) 40 MG capsule     sucralfate (CARAFATE) 1 GM/10ML suspension      Other Visit Diagnoses     Olecranon bursitis of right elbow        Relevant Medications    meloxicam (MOBIC) 15 MG tablet    History of esophageal stricture        Relevant Medications    sucralfate (CARAFATE) 1 GM/10ML suspension    Prostate cancer screening        Relevant Orders    PSA Screen    Medication monitoring encounter        Relevant Orders    CBC (No Diff)    Comprehensive Metabolic Panel

## 2020-05-19 ENCOUNTER — LAB (OUTPATIENT)
Dept: LAB | Facility: HOSPITAL | Age: 65
End: 2020-05-19

## 2020-05-19 DIAGNOSIS — Z51.81 MEDICATION MONITORING ENCOUNTER: ICD-10-CM

## 2020-05-19 DIAGNOSIS — E78.2 MIXED HYPERLIPIDEMIA: ICD-10-CM

## 2020-05-19 DIAGNOSIS — I10 ESSENTIAL HYPERTENSION, BENIGN: ICD-10-CM

## 2020-05-19 DIAGNOSIS — Z12.5 PROSTATE CANCER SCREENING: ICD-10-CM

## 2020-05-19 LAB
ALBUMIN SERPL-MCNC: 4.7 G/DL (ref 3.5–5.2)
ALBUMIN/GLOB SERPL: 1.8 G/DL
ALP SERPL-CCNC: 84 U/L (ref 39–117)
ALT SERPL W P-5'-P-CCNC: 36 U/L (ref 1–41)
ANION GAP SERPL CALCULATED.3IONS-SCNC: 12.1 MMOL/L (ref 5–15)
ARTICHOKE IGE QN: 147 MG/DL (ref 0–100)
AST SERPL-CCNC: 28 U/L (ref 1–40)
BILIRUB SERPL-MCNC: 0.5 MG/DL (ref 0.2–1.2)
BUN BLD-MCNC: 23 MG/DL (ref 8–23)
BUN/CREAT SERPL: 24.2 (ref 7–25)
CALCIUM SPEC-SCNC: 9.8 MG/DL (ref 8.6–10.5)
CHLORIDE SERPL-SCNC: 101 MMOL/L (ref 98–107)
CHOLEST SERPL-MCNC: 220 MG/DL (ref 0–200)
CO2 SERPL-SCNC: 25.9 MMOL/L (ref 22–29)
CREAT BLD-MCNC: 0.95 MG/DL (ref 0.76–1.27)
DEPRECATED RDW RBC AUTO: 39.6 FL (ref 37–54)
ERYTHROCYTE [DISTWIDTH] IN BLOOD BY AUTOMATED COUNT: 12.2 % (ref 12.3–15.4)
GFR SERPL CREATININE-BSD FRML MDRD: 79 ML/MIN/1.73
GLOBULIN UR ELPH-MCNC: 2.6 GM/DL
GLUCOSE BLD-MCNC: 108 MG/DL (ref 65–99)
HCT VFR BLD AUTO: 41.6 % (ref 37.5–51)
HDLC SERPL-MCNC: 39 MG/DL (ref 40–60)
HGB BLD-MCNC: 14.8 G/DL (ref 13–17.7)
LDLC SERPL CALC-MCNC: ABNORMAL MG/DL
LDLC/HDLC SERPL: ABNORMAL {RATIO}
MCH RBC QN AUTO: 32.1 PG (ref 26.6–33)
MCHC RBC AUTO-ENTMCNC: 35.6 G/DL (ref 31.5–35.7)
MCV RBC AUTO: 90.2 FL (ref 79–97)
PLATELET # BLD AUTO: 246 10*3/MM3 (ref 140–450)
PMV BLD AUTO: 10.6 FL (ref 6–12)
POTASSIUM BLD-SCNC: 3.8 MMOL/L (ref 3.5–5.2)
PROT SERPL-MCNC: 7.3 G/DL (ref 6–8.5)
PSA SERPL-MCNC: 2.14 NG/ML (ref 0–4)
RBC # BLD AUTO: 4.61 10*6/MM3 (ref 4.14–5.8)
SODIUM BLD-SCNC: 139 MMOL/L (ref 136–145)
TRIGL SERPL-MCNC: 476 MG/DL (ref 0–150)
VLDLC SERPL-MCNC: ABNORMAL MG/DL
WBC NRBC COR # BLD: 5.72 10*3/MM3 (ref 3.4–10.8)

## 2020-05-19 PROCEDURE — 80053 COMPREHEN METABOLIC PANEL: CPT

## 2020-05-19 PROCEDURE — 85027 COMPLETE CBC AUTOMATED: CPT

## 2020-05-19 PROCEDURE — 83721 ASSAY OF BLOOD LIPOPROTEIN: CPT

## 2020-05-19 PROCEDURE — G0103 PSA SCREENING: HCPCS

## 2020-05-19 PROCEDURE — 80061 LIPID PANEL: CPT

## 2020-05-19 PROCEDURE — 36415 COLL VENOUS BLD VENIPUNCTURE: CPT

## 2020-08-02 DIAGNOSIS — E78.2 MIXED HYPERLIPIDEMIA: ICD-10-CM

## 2020-08-02 DIAGNOSIS — I10 ESSENTIAL HYPERTENSION, BENIGN: ICD-10-CM

## 2020-08-03 RX ORDER — PRAVASTATIN SODIUM 40 MG
40 TABLET ORAL DAILY
Qty: 90 TABLET | Refills: 3 | Status: SHIPPED | OUTPATIENT
Start: 2020-08-03 | End: 2021-08-02 | Stop reason: SDUPTHER

## 2020-08-03 RX ORDER — HYDROCHLOROTHIAZIDE 25 MG/1
25 TABLET ORAL DAILY
Qty: 90 TABLET | Refills: 3 | Status: SHIPPED | OUTPATIENT
Start: 2020-08-03 | End: 2021-08-02 | Stop reason: SDUPTHER

## 2020-08-10 DIAGNOSIS — M70.21 OLECRANON BURSITIS OF RIGHT ELBOW: ICD-10-CM

## 2020-08-11 RX ORDER — MELOXICAM 15 MG/1
15 TABLET ORAL DAILY
Qty: 90 TABLET | Refills: 3 | Status: SHIPPED | OUTPATIENT
Start: 2020-08-11 | End: 2021-06-22

## 2020-09-13 DIAGNOSIS — I10 ESSENTIAL HYPERTENSION, BENIGN: ICD-10-CM

## 2020-09-17 RX ORDER — OMEPRAZOLE 40 MG/1
40 CAPSULE, DELAYED RELEASE ORAL DAILY
Qty: 30 CAPSULE | Refills: 11 | Status: SHIPPED | OUTPATIENT
Start: 2020-09-17 | End: 2021-09-13 | Stop reason: SDUPTHER

## 2020-09-17 RX ORDER — LOSARTAN POTASSIUM 100 MG/1
100 TABLET ORAL DAILY
Qty: 90 TABLET | Refills: 3 | Status: SHIPPED | OUTPATIENT
Start: 2020-09-17 | End: 2020-11-13 | Stop reason: SDUPTHER

## 2020-10-30 DIAGNOSIS — I10 ESSENTIAL HYPERTENSION, BENIGN: ICD-10-CM

## 2020-11-03 RX ORDER — LOSARTAN POTASSIUM 100 MG/1
100 TABLET ORAL DAILY
Qty: 90 TABLET | Refills: 3 | OUTPATIENT
Start: 2020-11-03

## 2020-11-03 NOTE — TELEPHONE ENCOUNTER
Last OV 05/14/20  Last RF   Losartan already sent in on 09/17/20 with 3 refills for the year to [facility]

## 2020-11-13 DIAGNOSIS — I10 ESSENTIAL HYPERTENSION, BENIGN: ICD-10-CM

## 2020-11-16 RX ORDER — LOSARTAN POTASSIUM 100 MG/1
100 TABLET ORAL DAILY
Qty: 90 TABLET | Refills: 3 | Status: SHIPPED | OUTPATIENT
Start: 2020-11-16 | End: 2021-04-13

## 2021-02-01 DIAGNOSIS — K58.2 IRRITABLE BOWEL SYNDROME WITH BOTH CONSTIPATION AND DIARRHEA: ICD-10-CM

## 2021-02-03 RX ORDER — DICYCLOMINE HCL 20 MG
TABLET ORAL
Qty: 60 TABLET | Refills: 10 | Status: SHIPPED | OUTPATIENT
Start: 2021-02-03 | End: 2021-06-22

## 2021-02-03 NOTE — TELEPHONE ENCOUNTER
Last Office Visit: 5/19/2020  Next Office Visit: no future appt scheduled     Labs completed in past 6 months? no  Labs completed in past year? yes    Medication: dicyclomine   Last Refill Date: 1/20/2020  Quantity: 60  Refills: 11    Pharmacy: Pharmacy:  [facility] [address] [place] [address] [address] AT [facility] [address] - [phone]  - [phone] FX

## 2021-03-26 ENCOUNTER — TELEPHONE (OUTPATIENT)
Dept: FAMILY MEDICINE CLINIC | Facility: CLINIC | Age: 66
End: 2021-03-26

## 2021-03-26 NOTE — TELEPHONE ENCOUNTER
Losartan 100 mg is maximum effective dose.  200 mg does not prove to show any clinical benefit.  Continue losartan 100 mg daily.  If blood pressure is elevated needs appointment.

## 2021-03-26 NOTE — TELEPHONE ENCOUNTER
----- Message from Chava Santiago sent at 3/26/2021  5:40 AM EDT -----  Regarding: Non-Urgent Medical Question  Contact: [phone]  On 3/12/2021 I experienced floaters in my eye. I started treating it as being dehydrated so I loaded up on water and Gatorade. I purchased a blood pressure cuff and started monitoring my blood pressure starting on 3/15/2021. At that time it was at a level 2 for HBP (160s 170s over High 90s to low 100s). I also discovered that it appeared that I had somehow not been taking my Losartan for some time (prescribe was a month behind). My blood pressure has come down to level  1 (High 130s to low 140s over low 80s to mid 90s). I have lost 10 lbs. in the past few months and am taking a Probiotic. These are the only changes. Should I double up on my Losartan  (once in the morning and once at night)  to get my BP lowered? floaters are less often.

## 2021-04-13 ENCOUNTER — OFFICE VISIT (OUTPATIENT)
Dept: FAMILY MEDICINE CLINIC | Facility: CLINIC | Age: 66
End: 2021-04-13

## 2021-04-13 VITALS
OXYGEN SATURATION: 96 % | SYSTOLIC BLOOD PRESSURE: 160 MMHG | BODY MASS INDEX: 32.74 KG/M2 | WEIGHT: 221.8 LBS | DIASTOLIC BLOOD PRESSURE: 90 MMHG | TEMPERATURE: 97.5 F | HEART RATE: 61 BPM

## 2021-04-13 DIAGNOSIS — I10 ESSENTIAL HYPERTENSION, BENIGN: Primary | ICD-10-CM

## 2021-04-13 DIAGNOSIS — L60.0 INGROWN TOENAIL WITH INFECTION: ICD-10-CM

## 2021-04-13 PROCEDURE — 99213 OFFICE O/P EST LOW 20 MIN: CPT | Performed by: FAMILY MEDICINE

## 2021-04-13 RX ORDER — VALSARTAN 160 MG/1
160 TABLET ORAL DAILY
Qty: 30 TABLET | Refills: 5 | Status: SHIPPED | OUTPATIENT
Start: 2021-04-13 | End: 2021-10-03 | Stop reason: SDUPTHER

## 2021-04-13 RX ORDER — CEPHALEXIN 500 MG/1
500 CAPSULE ORAL 3 TIMES DAILY
Qty: 30 CAPSULE | Refills: 0 | Status: SHIPPED | OUTPATIENT
Start: 2021-04-13 | End: 2021-06-22

## 2021-04-13 RX ORDER — PREDNISOLONE ACETATE 10 MG/ML
SUSPENSION/ DROPS OPHTHALMIC
COMMUNITY
Start: 2021-03-20 | End: 2021-06-22

## 2021-04-13 NOTE — PROGRESS NOTES
Conrad Santiago is a 65 y.o. male    Chief Complaint    High blood pressure    History of Present Illness  The patient presents today complaining of elevated blood pressure, which is 160/90 mmHg here in the office. His prescriptions for blood pressure include losartan 100 mg daily and hydrochlorothiazide 25 mg once daily. The patient presents with his blood pressure log and notes having constant headaches. He began keeping a blood pressure log because be started getting viscosupplementation injections for his knee. The formula was changed in 12/2020 which did not provide relief, so a cortisone injection was recommended. On 04/12/2021 the patient said he saw lightning bolts shooting in his eye.     The patient relates having a lot of trouble with bursitis in his elbow.  He said his whole arm and hand goes numb for 5 to 10 minutes around 4 times a day.     He also complains of an ingrown toenail that he has been digging at and is not clearing up.     The following portions of the patient's history were reviewed and updated as appropriate: allergies, current medications, past social history and problem list    Review of Systems   Constitutional: Negative for fatigue and unexpected weight change.   Respiratory: Negative for cough, chest tightness and shortness of breath.    Cardiovascular: Negative for chest pain, palpitations and leg swelling.   Gastrointestinal: Negative for nausea.   Skin: Negative for color change and rash.   Neurological: Negative for dizziness, syncope, weakness and headaches.       Objective     Vitals:    04/13/21 1447   BP: 160/90   Pulse: 61   Temp: 97.5 °F (36.4 °C)   SpO2: 96%       Physical Exam  Vitals and nursing note reviewed.   Constitutional:       Appearance: He is well-developed.   Neck:      Vascular: No JVD.   Cardiovascular:      Rate and Rhythm: Normal rate and regular rhythm.      Pulses: Normal pulses.      Heart sounds: Normal heart sounds. No murmur heard.      Pulmonary:      Effort: Pulmonary effort is normal. No respiratory distress.      Breath sounds: Normal breath sounds.   Abdominal:      General: Bowel sounds are normal.      Palpations: Abdomen is soft.      Tenderness: There is no abdominal tenderness.   Skin:     General: Skin is warm and dry.      Comments: Ingrown right great toenail with erythema and tenderness no drainage.   Psychiatric:         Mood and Affect: Mood normal.         Behavior: Behavior normal.         Assessment/Plan   Problems Addressed this Visit        Cardiac and Vasculature    Essential hypertension, benign - Primary    Relevant Medications    valsartan (Diovan) 160 MG tablet      Other Visit Diagnoses     Ingrown toenail with infection        Relevant Medications    cephalexin (KEFLEX) 500 MG capsule      Diagnoses       Codes Comments    Essential hypertension, benign    -  Primary ICD-10-CM: I10  ICD-9-CM: 401.1     Ingrown toenail with infection     ICD-10-CM: L60.0  ICD-9-CM: 703.0                Scribed for RUBEN Bangura MD by Irma Norirs.  04/13/21   15:41 EDT    I have personally performed the services described in this document as scribed by the above individual, and it is both accurate and complete.  RUBEN Bangura MD  4/13/2021  17:14 EDT

## 2021-06-21 ENCOUNTER — TELEPHONE (OUTPATIENT)
Dept: FAMILY MEDICINE CLINIC | Facility: CLINIC | Age: 66
End: 2021-06-21

## 2021-06-21 NOTE — TELEPHONE ENCOUNTER
----- Message from Chava SJamal Santiago sent at 6/20/2021  9:13 PM EDT -----  Regarding: Non-Urgent Medical Question  Contact: [phone]  For the last month or so I have experienced pain on the inside of my right elbow. The pain goes down into my right hand and my hand becomes numb and tingly. This pain causes me to make up multiple times at night when sleeping and bothers me during the day.  I am taking Alleve for pain but the does not help much. I have iced my elbow, put heat on my elbow and nothing seems to help.  I have also noticed  recently  pain in my shoulders and in my left hip and left lower back. I have bad arthetis in my left knee and I wear a brace when I work.  I have a limp and am wondering if this is causing problems in my back such as a pinched nerve.  Can Doctor Panchito Otto give me something for the pain and a treatment plan?

## 2021-06-22 ENCOUNTER — OFFICE VISIT (OUTPATIENT)
Dept: FAMILY MEDICINE CLINIC | Facility: CLINIC | Age: 66
End: 2021-06-22

## 2021-06-22 VITALS
TEMPERATURE: 97.3 F | SYSTOLIC BLOOD PRESSURE: 142 MMHG | HEART RATE: 74 BPM | OXYGEN SATURATION: 95 % | WEIGHT: 224.2 LBS | DIASTOLIC BLOOD PRESSURE: 90 MMHG | RESPIRATION RATE: 15 BRPM | BODY MASS INDEX: 31.39 KG/M2 | HEIGHT: 71 IN

## 2021-06-22 DIAGNOSIS — M79.2 NEURALGIA OF RIGHT UPPER EXTREMITY: Primary | ICD-10-CM

## 2021-06-22 PROCEDURE — 99213 OFFICE O/P EST LOW 20 MIN: CPT | Performed by: FAMILY MEDICINE

## 2021-06-22 RX ORDER — CELECOXIB 200 MG/1
200 CAPSULE ORAL DAILY
Qty: 30 CAPSULE | Refills: 5 | Status: SHIPPED | OUTPATIENT
Start: 2021-06-22 | End: 2021-12-20

## 2021-06-22 RX ORDER — PREDNISONE 10 MG/1
TABLET ORAL
Qty: 30 TABLET | Refills: 0 | Status: SHIPPED | OUTPATIENT
Start: 2021-06-22 | End: 2021-07-07

## 2021-06-22 NOTE — PROGRESS NOTES
"Conrad Santiago is a 65 y.o. male    Chief Complaint    Right elbow pain    History of Present Illness  The patient presents today for evaluation of right elbow pain. He has been complaining of right elbow pain for 2 months. The patient denies any known injury. He reports the pain keeps him awake at night. He adds his elbow will be sore and the pain will radiate down between his 3rd, 4th, and 5th fingers. The patient reports his hand goes numb. He reports his shoulder is sore. He adds his neck pops and he feels a \"clunk\" in his shoulder. The patient said several years ago he fell down some stairs and landed on his shoulder. X-rays were done at that time, and no fracture was noted.  He had eye surgery and was told not to sleep on that side, so he has been sleeping on his shoulder. He has tried ice and heat without much relief.     He reports his meloxicam ran out and would like to try something else.    The following portions of the patient's history were reviewed and updated as appropriate: allergies, current medications, past social history and problem list    Review of Systems   Constitutional: Negative for chills and fever.   HENT: Negative for sinus pain, sore throat and voice change.    Eyes: Negative for pain and redness.   Respiratory: Negative for cough, shortness of breath and wheezing.    Cardiovascular: Negative for chest pain and palpitations.   Gastrointestinal: Negative for abdominal pain, diarrhea, nausea and vomiting.   Musculoskeletal: Positive for arthralgias, myalgias and neck pain.   Skin: Negative.    Allergic/Immunologic: Negative for immunocompromised state.   Neurological: Positive for weakness and numbness. Negative for seizures and syncope.   Hematological: Negative for adenopathy. Does not bruise/bleed easily.   Psychiatric/Behavioral: Negative for dysphoric mood. The patient is not nervous/anxious.        Objective     Vitals:    06/22/21 1301   BP: 142/90   Pulse: 74   Resp: 15 "   Temp: 97.3 °F (36.3 °C)   SpO2: 95%       Physical Exam  Vitals and nursing note reviewed.   Constitutional:       Appearance: Normal appearance.   HENT:      Head: Normocephalic and atraumatic.   Eyes:      Conjunctiva/sclera: Conjunctivae normal.      Pupils: Pupils are equal, round, and reactive to light.   Neck:      Vascular: No carotid bruit.   Cardiovascular:      Rate and Rhythm: Normal rate and regular rhythm.   Pulmonary:      Effort: Pulmonary effort is normal.      Breath sounds: Normal breath sounds.   Musculoskeletal:      Cervical back: Neck supple. No tenderness.   Lymphadenopathy:      Cervical: No cervical adenopathy.   Skin:     General: Skin is warm and dry.   Neurological:      General: No focal deficit present.      Mental Status: He is alert.      Sensory: No sensory deficit.   Psychiatric:         Mood and Affect: Mood normal.         Behavior: Behavior normal.         Assessment/Plan   Problems Addressed this Visit     None      Visit Diagnoses     Neuralgia of right upper extremity    -  Primary    Relevant Medications    predniSONE (DELTASONE) 10 MG tablet    celecoxib (CeleBREX) 200 MG capsule      Diagnoses       Codes Comments    Neuralgia of right upper extremity    -  Primary ICD-10-CM: M79.2  ICD-9-CM: 723.4         If not better will need nerve conduction studies of the right upper extremity.       Scribed for RUBEN Bangura MD by Irma Norris.  06/22/21   13:34 EDT    I have personally performed the services described in this document as scribed by the above individual, and it is both accurate and complete.  RUBEN Bangura MD  6/23/2021  07:52 EDT

## 2021-07-07 ENCOUNTER — OFFICE VISIT (OUTPATIENT)
Dept: FAMILY MEDICINE CLINIC | Facility: CLINIC | Age: 66
End: 2021-07-07

## 2021-07-07 VITALS
RESPIRATION RATE: 15 BRPM | WEIGHT: 225 LBS | HEIGHT: 71 IN | DIASTOLIC BLOOD PRESSURE: 88 MMHG | SYSTOLIC BLOOD PRESSURE: 138 MMHG | TEMPERATURE: 96.8 F | BODY MASS INDEX: 31.5 KG/M2

## 2021-07-07 DIAGNOSIS — G56.22 ULNAR NEUROPATHY AT ELBOW, LEFT: Primary | ICD-10-CM

## 2021-07-07 DIAGNOSIS — M79.2 NEURALGIA OF RIGHT UPPER EXTREMITY: ICD-10-CM

## 2021-07-07 PROCEDURE — 99213 OFFICE O/P EST LOW 20 MIN: CPT | Performed by: FAMILY MEDICINE

## 2021-07-07 RX ORDER — PREDNISONE 20 MG/1
TABLET ORAL
Qty: 10 TABLET | Refills: 0 | Status: SHIPPED | OUTPATIENT
Start: 2021-07-07 | End: 2021-11-23

## 2021-07-07 NOTE — PROGRESS NOTES
Conrad Santiago is a 65 y.o. male    Chief Complaint    Right elbow pain    History of Present Illness  The patient presents today for evaluation of right elbow pain. He was seen 2 weeks ago with pain and neuralgia in the right elbow region. He was felt to have a neuritis-type phenomenon. This was treated with prednisone. He reports today that he is improved.    He reports he has arthritis in his knee and it is bone on bone. The patient had the first set of injections at [place], which went well, but by the time he went through the second set, he had [employer] [place] [employer] and they no longer cover it, so it came out of pocket. He was given a different brand of the viscosupplementationl and it did not work quite as good as the first one. The patient then had another round this past spring.    He reports his right elbow is doing great and only flares up every now and then, but he felt some difference after that. The patient notes every once in a while he will get onto his shoulder and he will feel pain. He said 2 days after he was seen here for his right elbow, he was lifting a coffee pot at work onto a stand and instead of using 2 hands to do it he used only his left. It is better now.     The following portions of the patient's history were reviewed and updated as appropriate: allergies, current medications, past social history and problem list    Review of Systems   Constitutional: Negative for chills and fever.   HENT: Negative for sinus pain, sore throat and voice change.    Eyes: Negative for pain and redness.   Respiratory: Negative for cough, shortness of breath and wheezing.    Cardiovascular: Negative for chest pain and palpitations.   Gastrointestinal: Negative for abdominal pain, diarrhea, nausea and vomiting.   Musculoskeletal: Positive for arthralgias, myalgias and neck pain.   Skin: Negative.    Allergic/Immunologic: Negative for immunocompromised state.   Neurological: Positive for  weakness and numbness. Negative for seizures and syncope.   Hematological: Negative for adenopathy. Does not bruise/bleed easily.   Psychiatric/Behavioral: Negative for dysphoric mood. The patient is not nervous/anxious.        Objective     Vitals:    07/07/21 1542   BP: 138/88   Resp: 15   Temp: 96.8 °F (36 °C)       Physical Exam  Vitals and nursing note reviewed.   Constitutional:       Appearance: Normal appearance.   HENT:      Head: Normocephalic and atraumatic.   Eyes:      Conjunctiva/sclera: Conjunctivae normal.      Pupils: Pupils are equal, round, and reactive to light.   Neck:      Vascular: No carotid bruit.   Cardiovascular:      Rate and Rhythm: Normal rate and regular rhythm.   Pulmonary:      Effort: Pulmonary effort is normal.      Breath sounds: Normal breath sounds.   Musculoskeletal:      Right elbow: Normal. No swelling or deformity. Normal range of motion. No tenderness.      Left elbow: Decreased range of motion. Tenderness present.      Cervical back: Neck supple. No tenderness.      Comments: Tender over left ulnar nerve at the elbow   Lymphadenopathy:      Cervical: No cervical adenopathy.   Skin:     General: Skin is warm and dry.   Neurological:      General: No focal deficit present.      Mental Status: He is alert.      Sensory: No sensory deficit.   Psychiatric:         Mood and Affect: Mood normal.         Behavior: Behavior normal.         Assessment/Plan   Problems Addressed this Visit     None      Visit Diagnoses     Ulnar neuropathy at elbow, left    -  Primary    Neuralgia of right upper extremity          Diagnoses       Codes Comments    Ulnar neuropathy at elbow, left    -  Primary ICD-10-CM: G56.22  ICD-9-CM: 354.2     Neuralgia of right upper extremity     ICD-10-CM: M79.2  ICD-9-CM: 723.4                Scribed for R Miguel Ángel Bangura MD by Irma Norris.  07/07/21   17:30 EDT    I have personally performed the services described in this document as scribed by the  above individual, and it is both accurate and complete.  RUBEN Bangura MD  7/7/2021  22:21 EDT

## 2021-07-14 RX ORDER — MONTELUKAST SODIUM 4 MG/1
1 TABLET, CHEWABLE ORAL 2 TIMES DAILY PRN
Qty: 60 TABLET | Refills: 11 | Status: SHIPPED | OUTPATIENT
Start: 2021-07-14 | End: 2022-09-01

## 2021-07-26 ENCOUNTER — TELEPHONE (OUTPATIENT)
Dept: FAMILY MEDICINE CLINIC | Facility: CLINIC | Age: 66
End: 2021-07-26

## 2021-07-26 RX ORDER — CEPHALEXIN 500 MG/1
500 CAPSULE ORAL 3 TIMES DAILY
Qty: 30 CAPSULE | Refills: 0 | Status: SHIPPED | OUTPATIENT
Start: 2021-07-26 | End: 2021-11-23

## 2021-07-26 NOTE — TELEPHONE ENCOUNTER
----- Message from Chava Santiago sent at 7/25/2021  7:57 PM EDT -----  Regarding: Prescription Question  Contact: [phone]  Doctor Panchito Soliz prescribed CEPHALEXIN 500MG for me because I had a infection in my large toe from a ingrown toe nail. I now have a infection in my 1st finger on my left hand from a hang nail. My finger is very sore and swollen. Since this medication worked so well on my toe can he prescribe it for the problem that I am having with my finger?

## 2021-08-02 ENCOUNTER — OFFICE VISIT (OUTPATIENT)
Dept: FAMILY MEDICINE CLINIC | Facility: CLINIC | Age: 66
End: 2021-08-02

## 2021-08-02 VITALS
DIASTOLIC BLOOD PRESSURE: 84 MMHG | HEIGHT: 71 IN | OXYGEN SATURATION: 99 % | TEMPERATURE: 96.9 F | SYSTOLIC BLOOD PRESSURE: 134 MMHG | WEIGHT: 219 LBS | BODY MASS INDEX: 30.66 KG/M2 | HEART RATE: 65 BPM | RESPIRATION RATE: 15 BRPM

## 2021-08-02 DIAGNOSIS — I10 ESSENTIAL HYPERTENSION, BENIGN: ICD-10-CM

## 2021-08-02 DIAGNOSIS — E78.2 MIXED HYPERLIPIDEMIA: ICD-10-CM

## 2021-08-02 DIAGNOSIS — L03.011 PARONYCHIA OF RIGHT INDEX FINGER: Primary | ICD-10-CM

## 2021-08-02 PROCEDURE — 99213 OFFICE O/P EST LOW 20 MIN: CPT | Performed by: FAMILY MEDICINE

## 2021-08-02 PROCEDURE — 10060 I&D ABSCESS SIMPLE/SINGLE: CPT | Performed by: FAMILY MEDICINE

## 2021-08-02 RX ORDER — OXYCODONE AND ACETAMINOPHEN 7.5; 325 MG/1; MG/1
1 TABLET ORAL EVERY 6 HOURS PRN
Qty: 12 TABLET | Refills: 0 | Status: SHIPPED | OUTPATIENT
Start: 2021-08-02 | End: 2021-11-23

## 2021-08-02 RX ORDER — DOXYCYCLINE 100 MG/1
100 CAPSULE ORAL 2 TIMES DAILY
Qty: 14 CAPSULE | Refills: 0 | Status: SHIPPED | OUTPATIENT
Start: 2021-08-02 | End: 2021-08-09

## 2021-08-02 RX ORDER — HYDROCHLOROTHIAZIDE 25 MG/1
25 TABLET ORAL DAILY
Qty: 90 TABLET | Refills: 3 | Status: SHIPPED | OUTPATIENT
Start: 2021-08-02 | End: 2022-07-15

## 2021-08-02 RX ORDER — PRAVASTATIN SODIUM 40 MG
40 TABLET ORAL DAILY
Qty: 90 TABLET | Refills: 3 | Status: SHIPPED | OUTPATIENT
Start: 2021-08-02 | End: 2022-08-01

## 2021-08-02 NOTE — PROGRESS NOTES
Conrad Santiago is a 65 y.o. male    Chief Complaint    Left index finger pain and swelling    History of Present Illness  The patient has a suspected infection of the left 2nd finger. He was treated empirically over the phone with cephalexin, but apparently has not improved. He is here today for a follow-up.    He requests a refill for his pravastatin and hydrochlorothiazide.    The following portions of the patient's history were reviewed and updated as appropriate: allergies, current medications, past social history and problem list    Review of Systems   Constitutional: Negative for chills, fatigue, fever and unexpected weight change.   Respiratory: Negative for cough, chest tightness, shortness of breath and wheezing.    Cardiovascular: Negative for chest pain, palpitations and leg swelling.   Gastrointestinal: Negative for abdominal pain, nausea and vomiting.   Endocrine: Negative for polydipsia, polyphagia and polyuria.   Genitourinary: Negative for dysuria, frequency and urgency.   Musculoskeletal: Negative for arthralgias, back pain and myalgias.        Paronychia, right index finger.   Skin: Negative for color change and rash.   Neurological: Negative for dizziness, syncope, weakness and headaches.   Hematological: Negative for adenopathy. Does not bruise/bleed easily.       Objective     Vitals:    08/02/21 1536   BP: 134/84   Pulse: 65   Resp: 15   Temp: 96.9 °F (36.1 °C)   SpO2: 99%       Physical Exam  Vitals and nursing note reviewed.   Constitutional:       Appearance: He is well-developed.   HENT:      Head: Normocephalic.   Eyes:      Conjunctiva/sclera: Conjunctivae normal.      Pupils: Pupils are equal, round, and reactive to light.   Neck:      Thyroid: No thyromegaly.      Vascular: No JVD.   Cardiovascular:      Rate and Rhythm: Normal rate and regular rhythm.      Pulses: Normal pulses.      Heart sounds: Normal heart sounds. No murmur heard.     Pulmonary:      Effort: Pulmonary  effort is normal. No respiratory distress.      Breath sounds: Normal breath sounds.   Abdominal:      General: Bowel sounds are normal.      Palpations: Abdomen is soft.      Tenderness: There is no abdominal tenderness.   Musculoskeletal:      Left hand: Swelling and deformity present.      Cervical back: Neck supple.      Comments: Paronychia left index finger.  PROCEDURE: Patient gives verbal approval for incision and drainage of paronychia.  Finger is prepped with Betadine.  Local anesthetic with 1% Xylocaine without epinephrine.  Incision with #15 blade scalpel produces purulent debris followed by bloody drainage.  Firm dressing applied.  Wound care instructions given.   Lymphadenopathy:      Cervical: No cervical adenopathy.   Skin:     General: Skin is warm and dry.      Findings: No rash.   Neurological:      General: No focal deficit present.      Mental Status: He is alert and oriented to person, place, and time.   Psychiatric:         Mood and Affect: Mood normal.         Behavior: Behavior normal.         Assessment/Plan   Problems Addressed this Visit        Cardiac and Vasculature    Essential hypertension, benign    Relevant Medications    hydroCHLOROthiazide (HYDRODIURIL) 25 MG tablet    Hyperlipidemia    Relevant Medications    pravastatin (PRAVACHOL) 40 MG tablet      Other Visit Diagnoses     Paronychia of right index finger    -  Primary    Relevant Medications    oxyCODONE-acetaminophen (Percocet) 7.5-325 MG per tablet    doxycycline (MONODOX) 100 MG capsule      Diagnoses       Codes Comments    Paronychia of right index finger    -  Primary ICD-10-CM: L03.011  ICD-9-CM: 681.02     Mixed hyperlipidemia     ICD-10-CM: E78.2  ICD-9-CM: 272.2     Essential hypertension, benign     ICD-10-CM: I10  ICD-9-CM: 401.1         Please note that portions of this document were completed with a voice recognition program. Efforts were made to edit the dictations, but occasionally words are mis-transcribed         Scribed for R Miguel Ángel Bangura MD by Irma Norris.  08/02/21   17:10 EDT    I have personally performed the services described in this document as scribed by the above individual, and it is both accurate and complete.  RUBEN Bangura MD  8/2/2021  17:15 EDT

## 2021-08-05 ENCOUNTER — OFFICE VISIT (OUTPATIENT)
Dept: FAMILY MEDICINE CLINIC | Facility: CLINIC | Age: 66
End: 2021-08-05

## 2021-08-05 VITALS
HEIGHT: 71 IN | TEMPERATURE: 97 F | WEIGHT: 220 LBS | SYSTOLIC BLOOD PRESSURE: 138 MMHG | OXYGEN SATURATION: 99 % | BODY MASS INDEX: 30.8 KG/M2 | HEART RATE: 65 BPM | DIASTOLIC BLOOD PRESSURE: 80 MMHG

## 2021-08-05 DIAGNOSIS — L03.019 PARONYCHIA OF FINGER, UNSPECIFIED LATERALITY: Primary | ICD-10-CM

## 2021-08-05 PROCEDURE — 99024 POSTOP FOLLOW-UP VISIT: CPT | Performed by: FAMILY MEDICINE

## 2021-08-05 NOTE — PROGRESS NOTES
Conrad Santiago is a 65 y.o. male    Chief Complaint    Left index finger infection    History of Present Illness  The patient is here for a follow-up visit after a procedure on his left index saeid earlier this week. We had performed an incision and drainage of a paronychia. He reports the antibiotic is causing nausea.    The following portions of the patient's history were reviewed and updated as appropriate: allergies, current medications, past social history and problem list    Review of Systems   Constitutional: Negative for chills, fatigue, fever and unexpected weight change.   Respiratory: Negative for cough, chest tightness, shortness of breath and wheezing.    Cardiovascular: Negative for chest pain, palpitations and leg swelling.   Gastrointestinal: Positive for nausea. Negative for abdominal pain and vomiting.   Endocrine: Negative for polydipsia, polyphagia and polyuria.   Genitourinary: Negative for dysuria, frequency and urgency.   Musculoskeletal: Negative for arthralgias, back pain and myalgias.        Paronychia, right index finger.   Skin: Negative for color change and rash.   Neurological: Negative for dizziness, syncope, weakness and headaches.   Hematological: Negative for adenopathy. Does not bruise/bleed easily.       Objective     Vitals:    08/05/21 1552   BP: 138/80   Pulse: 65   Temp: 97 °F (36.1 °C)   SpO2: 99%       Physical Exam  Vitals and nursing note reviewed.   Constitutional:       Appearance: He is well-developed. He is obese.   HENT:      Head: Normocephalic and atraumatic.   Eyes:      Conjunctiva/sclera: Conjunctivae normal.      Pupils: Pupils are equal, round, and reactive to light.   Neck:      Thyroid: No thyromegaly.      Vascular: No JVD.   Cardiovascular:      Rate and Rhythm: Normal rate and regular rhythm.      Pulses: Normal pulses.      Heart sounds: Normal heart sounds. No murmur heard.     Pulmonary:      Effort: Pulmonary effort is normal. No respiratory  distress.      Breath sounds: Normal breath sounds.   Abdominal:      General: Bowel sounds are normal.      Palpations: Abdomen is soft.      Tenderness: There is no abdominal tenderness.   Musculoskeletal:      Left hand: Swelling and deformity present.      Comments: Wound at distal left index finger with appropriate healing.  Wound is still open which is good so drainage can occur.  There is no purulent drainage.  There is no odor.  Surrounding erythema has resolved.   Skin:     General: Skin is warm and dry.      Findings: No rash.   Neurological:      General: No focal deficit present.      Mental Status: He is alert and oriented to person, place, and time.   Psychiatric:         Mood and Affect: Mood normal.         Behavior: Behavior normal.         Assessment/Plan   Problems Addressed this Visit     None      Visit Diagnoses     Paronychia of finger, unspecified laterality    -  Primary      Diagnoses       Codes Comments    Paronychia of finger, unspecified laterality    -  Primary ICD-10-CM: L03.019  ICD-9-CM: 681.02       Patient advised to continue to soak his finger as previously recommended.  Keep covered with Band-Aid until incision site has healed.    Please note that portions of this document were completed with a voice recognition program. Efforts were made to edit the dictations, but occasionally words are mis-transcribed          Transcribed from ambient dictation for RUBEN Bangura MD by Irma Norris.  08/05/21   16:31 EDT    I have personally performed the services described in this document as transcribed by the above individual, and it is both accurate and complete.  RUBEN Bangura MD  8/5/2021  16:37 EDT

## 2021-08-12 ENCOUNTER — TELEPHONE (OUTPATIENT)
Dept: FAMILY MEDICINE CLINIC | Facility: CLINIC | Age: 66
End: 2021-08-12

## 2021-08-12 NOTE — TELEPHONE ENCOUNTER
----- Message from Chava SJamal Santiago sent at 8/11/2021  7:18 PM EDT -----  Regarding: Non-Urgent Medical Question  Contact: [phone]  I am attempting to find a good orthopedic doctor for the problems that I have with my left knee. Dr. Bangura recommended Dr. Navas at [facility] on [facility] for my wife a few years back. Dr. Navas has retired. Does Dr. Bangura know who the doctor is out there now and does he recommend this doctor? Is there another doctor that he would recommend? Thank you have a goood day.

## 2021-09-13 DIAGNOSIS — L03.011 PARONYCHIA OF RIGHT INDEX FINGER: ICD-10-CM

## 2021-09-13 RX ORDER — OMEPRAZOLE 40 MG/1
40 CAPSULE, DELAYED RELEASE ORAL DAILY
Qty: 30 CAPSULE | Refills: 11 | Status: SHIPPED | OUTPATIENT
Start: 2021-09-13 | End: 2022-09-12

## 2021-09-13 RX ORDER — OXYCODONE AND ACETAMINOPHEN 7.5; 325 MG/1; MG/1
1 TABLET ORAL EVERY 6 HOURS PRN
Qty: 12 TABLET | Refills: 0 | Status: CANCELLED | OUTPATIENT
Start: 2021-09-13

## 2021-10-03 DIAGNOSIS — I10 ESSENTIAL HYPERTENSION, BENIGN: ICD-10-CM

## 2021-10-04 RX ORDER — VALSARTAN 160 MG/1
160 TABLET ORAL DAILY
Qty: 30 TABLET | Refills: 1 | Status: SHIPPED | OUTPATIENT
Start: 2021-10-04 | End: 2021-12-05 | Stop reason: SDUPTHER

## 2021-11-23 ENCOUNTER — OFFICE VISIT (OUTPATIENT)
Dept: FAMILY MEDICINE CLINIC | Facility: CLINIC | Age: 66
End: 2021-11-23

## 2021-11-23 VITALS
HEART RATE: 90 BPM | SYSTOLIC BLOOD PRESSURE: 130 MMHG | DIASTOLIC BLOOD PRESSURE: 82 MMHG | TEMPERATURE: 98 F | RESPIRATION RATE: 16 BRPM | HEIGHT: 71 IN | BODY MASS INDEX: 31.57 KG/M2 | OXYGEN SATURATION: 97 % | WEIGHT: 225.5 LBS

## 2021-11-23 DIAGNOSIS — M25.521 BILATERAL ELBOW JOINT PAIN: Primary | ICD-10-CM

## 2021-11-23 DIAGNOSIS — M25.562 ACUTE PAIN OF LEFT KNEE: ICD-10-CM

## 2021-11-23 DIAGNOSIS — M25.522 BILATERAL ELBOW JOINT PAIN: Primary | ICD-10-CM

## 2021-11-23 DIAGNOSIS — J02.9 PHARYNGITIS, UNSPECIFIED ETIOLOGY: ICD-10-CM

## 2021-11-23 PROCEDURE — 99214 OFFICE O/P EST MOD 30 MIN: CPT | Performed by: FAMILY MEDICINE

## 2021-11-23 RX ORDER — AZITHROMYCIN 250 MG/1
TABLET, FILM COATED ORAL
Qty: 6 TABLET | Refills: 0 | Status: SHIPPED | OUTPATIENT
Start: 2021-11-23 | End: 2022-01-10

## 2021-11-23 RX ORDER — PREDNISONE 10 MG/1
TABLET ORAL
Qty: 30 TABLET | Refills: 0 | Status: SHIPPED | OUTPATIENT
Start: 2021-11-23 | End: 2022-01-10

## 2021-11-23 NOTE — PROGRESS NOTES
Conrad Santiago is a 65 y.o. male    Chief Complaint    Bilateral elbow pain  Hand numbness  Pain behind left knee  Sore throat    History of Present Illness  The patient presents today for evaluation of bilateral elbow pain and hand numbness. He reports his symptoms are getting a lot worse. The patient said it wakes him up at night. The patient said last night when he was at work, his left hand went numb for approximately 40 minutes. He said he was just holding a clipboard. The patient said his bilateral upper extremities are about the same. He has tried prednisone in the past, which seemed to help a little bit, but when he got off of it the symptoms returned.    The patient has been having a lot of pain that shoots down into his left ankle and into his foot. He has a Baker's cyst.     The patient has had a sore throat for approximately 3 weeks, which is probably allergy related.     The following portions of the patient's history were reviewed and updated as appropriate: allergies, current medications, past social history and problem list    Review of Systems   Constitutional: Negative for chills and fever.   HENT: Positive for postnasal drip and sore throat. Negative for sinus pain and voice change.         Positive pharyngitis.   Eyes: Negative for pain and redness.   Respiratory: Negative for cough, shortness of breath and wheezing.    Cardiovascular: Negative for chest pain and palpitations.   Gastrointestinal: Negative for abdominal pain, diarrhea, nausea and vomiting.   Musculoskeletal: Positive for arthralgias, myalgias and neck pain.   Skin: Negative.    Allergic/Immunologic: Negative for immunocompromised state.   Neurological: Positive for weakness and numbness. Negative for seizures and syncope.   Hematological: Negative for adenopathy. Does not bruise/bleed easily.   Psychiatric/Behavioral: Negative for dysphoric mood. The patient is not nervous/anxious.        Objective     Vitals:     11/23/21 0940   BP: 130/82   Pulse: 90   Resp: 16   Temp: 98 °F (36.7 °C)   SpO2: 97%       Physical Exam  Vitals and nursing note reviewed.   Constitutional:       Appearance: Normal appearance.   HENT:      Head: Normocephalic and atraumatic.      Right Ear: Tympanic membrane and ear canal normal.      Left Ear: Tympanic membrane and ear canal normal.      Mouth/Throat:      Mouth: Mucous membranes are moist.      Pharynx: Posterior oropharyngeal erythema present. No oropharyngeal exudate.   Eyes:      Conjunctiva/sclera: Conjunctivae normal.      Pupils: Pupils are equal, round, and reactive to light.   Neck:      Vascular: No carotid bruit.   Cardiovascular:      Rate and Rhythm: Normal rate and regular rhythm.   Pulmonary:      Effort: Pulmonary effort is normal.      Breath sounds: Normal breath sounds.   Musculoskeletal:      Right elbow: Normal. No swelling or deformity. Normal range of motion. No tenderness.      Left elbow: Decreased range of motion. Tenderness present.      Cervical back: Neck supple. No tenderness.      Comments: Tender over left ulnar nerve at the elbow   Lymphadenopathy:      Cervical: No cervical adenopathy.   Skin:     General: Skin is warm and dry.   Neurological:      General: No focal deficit present.      Mental Status: He is alert.      Sensory: No sensory deficit.   Psychiatric:         Mood and Affect: Mood normal.         Behavior: Behavior normal.         Assessment/Plan   Problems Addressed this Visit     None      Visit Diagnoses     Bilateral elbow joint pain    -  Primary    Relevant Orders    Ambulatory Referral to Orthopedic Surgery    Acute pain of left knee        Relevant Orders    Ambulatory Referral to Orthopedic Surgery    Pharyngitis, unspecified etiology          Diagnoses       Codes Comments    Bilateral elbow joint pain    -  Primary ICD-10-CM: M25.521, M25.522  ICD-9-CM: 719.42     Acute pain of left knee     ICD-10-CM: M25.562  ICD-9-CM: 719.46      Pharyngitis, unspecified etiology     ICD-10-CM: J02.9  ICD-9-CM: 462         I spent 25 minutes in patient care: Reviewing records prior to the visit, examining the patient, entering orders and documentation    Part of this note may be an electronic transcription/translation of spoken language to printed text using the Dragon Dictation System.         Transcribed from ambient dictation for RUBEN Bangura MD by Irma Norris.  11/23/21   11:21 EST    Patient verbalized consent to the visit recording.

## 2021-11-29 ENCOUNTER — OFFICE VISIT (OUTPATIENT)
Dept: ORTHOPEDIC SURGERY | Facility: CLINIC | Age: 66
End: 2021-11-29

## 2021-11-29 VITALS
HEIGHT: 71 IN | WEIGHT: 224.87 LBS | BODY MASS INDEX: 31.48 KG/M2 | HEART RATE: 75 BPM | DIASTOLIC BLOOD PRESSURE: 78 MMHG | SYSTOLIC BLOOD PRESSURE: 132 MMHG

## 2021-11-29 DIAGNOSIS — M17.12 PRIMARY OSTEOARTHRITIS OF LEFT KNEE: ICD-10-CM

## 2021-11-29 DIAGNOSIS — M25.562 LEFT KNEE PAIN, UNSPECIFIED CHRONICITY: Primary | ICD-10-CM

## 2021-11-29 PROCEDURE — 99214 OFFICE O/P EST MOD 30 MIN: CPT | Performed by: PHYSICIAN ASSISTANT

## 2021-11-29 PROCEDURE — 20610 DRAIN/INJ JOINT/BURSA W/O US: CPT | Performed by: PHYSICIAN ASSISTANT

## 2021-11-29 RX ADMIN — TRIAMCINOLONE ACETONIDE 40 MG: 40 INJECTION, SUSPENSION INTRA-ARTICULAR; INTRAMUSCULAR at 14:43

## 2021-11-29 RX ADMIN — LIDOCAINE HYDROCHLORIDE 4 ML: 10 INJECTION, SOLUTION EPIDURAL; INFILTRATION; INTRACAUDAL; PERINEURAL at 14:43

## 2021-12-02 RX ORDER — LIDOCAINE HYDROCHLORIDE 10 MG/ML
4 INJECTION, SOLUTION EPIDURAL; INFILTRATION; INTRACAUDAL; PERINEURAL
Status: COMPLETED | OUTPATIENT
Start: 2021-11-29 | End: 2021-11-29

## 2021-12-02 RX ORDER — TRIAMCINOLONE ACETONIDE 40 MG/ML
40 INJECTION, SUSPENSION INTRA-ARTICULAR; INTRAMUSCULAR
Status: COMPLETED | OUTPATIENT
Start: 2021-11-29 | End: 2021-11-29

## 2021-12-05 DIAGNOSIS — I10 ESSENTIAL HYPERTENSION, BENIGN: ICD-10-CM

## 2021-12-06 RX ORDER — VALSARTAN 160 MG/1
160 TABLET ORAL DAILY
Qty: 30 TABLET | Refills: 1 | Status: SHIPPED | OUTPATIENT
Start: 2021-12-06 | End: 2022-01-31

## 2021-12-09 ENCOUNTER — OFFICE VISIT (OUTPATIENT)
Dept: ORTHOPEDIC SURGERY | Facility: CLINIC | Age: 66
End: 2021-12-09

## 2021-12-09 DIAGNOSIS — G56.21 CUBITAL TUNNEL SYNDROME ON RIGHT: Primary | ICD-10-CM

## 2021-12-09 DIAGNOSIS — M25.521 PAIN OF BOTH ELBOWS: ICD-10-CM

## 2021-12-09 DIAGNOSIS — M25.522 PAIN OF BOTH ELBOWS: ICD-10-CM

## 2021-12-09 DIAGNOSIS — G56.22 CUBITAL TUNNEL SYNDROME ON LEFT: ICD-10-CM

## 2021-12-09 PROCEDURE — 99214 OFFICE O/P EST MOD 30 MIN: CPT | Performed by: ORTHOPAEDIC SURGERY

## 2021-12-09 NOTE — PROGRESS NOTES
[facility] [place] [facility] Surgery Office Visit - Kyaw Murillo MD    Office Visit       Patient Name: Chava Santiago    Chief Complaint:   Chief Complaint   Patient presents with   • Left Elbow - Pain   • Right Elbow - Pain       Referring Physician: No ref. provider found    History of Present Illness:   Chava Santiago is a 66 y.o. male who presents with bilateral body part: elbow Reason: pain.  Onset:Onset: atraumatic and gradual in nature. The issue has been ongoing for 6 month(s). Pain is a 6/10 on the pain scale. Pain is described as Pain Characterization: aching. Associated symptoms include Symptoms: pain. The pain is worse with sleeping, working and lying on affected side; pain medication and/or NSAID improve the pain. Previous treatments have included: NSAIDS.  I have reviewed the patient's history of present illness as noted/entered above.    I have reviewed the patient's past medical history, surgical history, social history, family history, medications, and allergies as noted in the electronic medical record and as noted/entered.  I have reviewed the patient's review of systems as noted/enter and updated as noted in the patient's HPI.    Bilateral elbow pain    Occupation: Security guard  [employer]  Treated with rest, NSAIDs, prednisone    Rates pain a 6 of 10 now ongoing for several months    Enjoys: guitar early 60-70s rock and roll; started playing age 10  Grew up in [facility]    RHD    RIGHT worse than left    Cane use on right side    BILATERAL cubital tunnel syndrome RIGHT worse than left  RIGHT -- fairly persistent numbness and tingling    Limitations playing guitar with the cubital tunnel syndrome    Celebrex, dosepak      Subjective   Subjective      Review of Systems   Constitutional: Negative.  Negative for chills, fatigue and fever.   HENT: Negative.  Negative for congestion and dental problem.    Eyes: Negative.  Negative for  blurred vision.   Respiratory: Negative.  Negative for shortness of breath.    Cardiovascular: Negative.  Negative for leg swelling.   Gastrointestinal: Negative.  Negative for abdominal pain.   Endocrine: Negative.  Negative for polyuria.   Genitourinary: Negative.  Negative for difficulty urinating.   Musculoskeletal: Positive for arthralgias.   Skin: Negative.    Allergic/Immunologic: Negative.    Neurological: Negative.    Hematological: Negative.  Negative for adenopathy.   Psychiatric/Behavioral: Negative.  Negative for behavioral problems.        Past Medical History:   Past Medical History:   Diagnosis Date   • Acute bronchitis    • Acute pharyngitis    • Conjunctivitis    • Hyperlipidemia    • Hypertension    • IBS (irritable bowel syndrome)    • Lumbago    • Rhinitis        Past Surgical History:   Past Surgical History:   Procedure Laterality Date   • EYE SURGERY Bilateral     multiple on both eyes   • HERNIA REPAIR Right     x2   • KNEE SURGERY Left     arthroscopy with meniscal repair- 10 years ago       Family History:   Family History   Adopted: Yes       Social History:   Social History     Socioeconomic History   • Marital status:    Tobacco Use   • Smoking status: Former Smoker   • Smokeless tobacco: Never Used   Substance and Sexual Activity   • Alcohol use: Yes     Comment: occ   • Drug use: No       Medications:   Current Outpatient Medications:   •  azithromycin (Zithromax Z-Royal) 250 MG tablet, Take 2 tablets the first day, then 1 tablet daily for 4 days., Disp: 6 tablet, Rfl: 0  •  celecoxib (CeleBREX) 200 MG capsule, Take 1 capsule by mouth Daily., Disp: 30 capsule, Rfl: 5  •  colestipol (COLESTID) 1 g tablet, Take 1 tablet by mouth 2 (Two) Times a Day As Needed (diarrhea)., Disp: 60 tablet, Rfl: 11  •  hydroCHLOROthiazide (HYDRODIURIL) 25 MG tablet, Take 1 tablet by mouth Daily., Disp: 90 tablet, Rfl: 3  •  omeprazole (priLOSEC) 40 MG capsule, Take 1 capsule by mouth Daily., Disp: 30  capsule, Rfl: 11  •  pravastatin (PRAVACHOL) 40 MG tablet, Take 1 tablet by mouth Daily., Disp: 90 tablet, Rfl: 3  •  predniSONE (DELTASONE) 10 MG tablet, 3 tablets daily for 5 days then 2 tablets daily for 5 days then 1 tablet daily for 5 days, Disp: 30 tablet, Rfl: 0  •  valsartan (Diovan) 160 MG tablet, Take 1 tablet by mouth Daily., Disp: 30 tablet, Rfl: 1    Allergies: No Known Allergies    The following portions of the patient's history were reviewed and updated as appropriate: allergies, current medications, past family history, past medical history, past social history, past surgical history and problem list.        Objective    Objective      Vital Signs: There were no vitals filed for this visit.    Ortho Exam:  General: no acute distress, comfortable  Vitals reviewed in chart    Musculoskeletal Exam    SIDE: Right worse than left elbow  Elbow Exam:    Tenderness: Cubital tunnel bilaterally    Range of motion measurements (degrees)  Elbow extension/flexion/pronation/supination  Active: Full range of motion  Passive: Full    Painful arc of motion: Negative  No evidence of septic joint    Positive Tinel's right worse than left and positive cubital tunnel syndrome testing with elbow flexion-clinical findings consistent with cubital tunnel syndrome ulnar neuropathy    Good motor strength the ulnar nerve distally but fairly persistent numbness and tingling ulnar nerve distribution on the right worse than the left      Results Review:   Imaging Results (Last 24 Hours)     Procedure Component Value Units Date/Time    XR Elbow 2 View Bilateral [[id number]] Resulted: 12/09/21 1434     Updated: 12/09/21 1434    Narrative:      Imaging: elbow x-rays 2 views - AP and lateral elbow x-ray views    Side: Bilateral elbow 2 views    Indication for elbow x-ray 2 views: elbow pain bilaterally    Comparison: no comparison views available    Findings: no acute bony finding noted, no obvious soft tissue edema  Bilateral elbow  x-rays reviewed    I personally reviewed the above x-rays and discussed with the patient.            Procedures             Assessment / Plan      Assessment/Plan:   Problem List Items Addressed This Visit        Musculoskeletal and Injuries    Pain of both elbows    Relevant Orders    XR Elbow 2 View Bilateral (Completed)       Neuro    Cubital tunnel syndrome on right - Primary    Relevant Orders    EMG & Nerve Conduction Test    Cubital tunnel syndrome on left    Relevant Orders    EMG & Nerve Conduction Test        Bilateral cubital tunnel syndrome/ulnar neuropathy right worse than left.  I recommend EMG/NCV of bilateral upper extremities.  He notes that he has tried conservative course with modifications and rest with little success medicines have helped some but really affecting his ADLs.    We will see what the studies reveal with regards to the severity of cubital tunnel syndrome bilaterally.    Follow Up: After bilateral EMG/NCV studies for cubital tunnel syndrome        Kyaw Murillo MD, FAAOS  Orthopedic Surgeon  Fellowship Trained Shoulder and Elbow Surgeon  [facility]  Orthopedics and Sports Medicine  [address] [place] [address], [address]  [place] [address]    12/09/21  14:35 EST

## 2021-12-15 ENCOUNTER — TELEPHONE (OUTPATIENT)
Dept: ORTHOPEDIC SURGERY | Facility: CLINIC | Age: 66
End: 2021-12-15

## 2021-12-15 NOTE — TELEPHONE ENCOUNTER
Caller: ABEBA MCKNIGHT    Relationship: SELF    Best call back number:     What is the best time to reach you: ANY     Who are you requesting to speak with (clinical staff, provider,  specific staff member): CLINICAL    What was the call regarding: NERVE CONDUCTION TEST SCHEDULING     Do you require a callback: YES

## 2021-12-15 NOTE — TELEPHONE ENCOUNTER
Called and LVM to inform Pt to call Central scheduling to get Nerve Conduction Test scheduled. Also provided the #

## 2021-12-19 DIAGNOSIS — M79.2 NEURALGIA OF RIGHT UPPER EXTREMITY: ICD-10-CM

## 2021-12-20 RX ORDER — CELECOXIB 200 MG/1
CAPSULE ORAL
Qty: 30 CAPSULE | Refills: 5 | Status: SHIPPED | OUTPATIENT
Start: 2021-12-20 | End: 2022-06-20

## 2022-01-10 ENCOUNTER — OFFICE VISIT (OUTPATIENT)
Dept: ORTHOPEDIC SURGERY | Facility: CLINIC | Age: 67
End: 2022-01-10

## 2022-01-10 VITALS
WEIGHT: 219.8 LBS | SYSTOLIC BLOOD PRESSURE: 142 MMHG | HEIGHT: 71 IN | BODY MASS INDEX: 30.77 KG/M2 | DIASTOLIC BLOOD PRESSURE: 98 MMHG

## 2022-01-10 DIAGNOSIS — M25.562 CHRONIC PAIN OF LEFT KNEE: ICD-10-CM

## 2022-01-10 DIAGNOSIS — G89.29 CHRONIC PAIN OF LEFT KNEE: ICD-10-CM

## 2022-01-10 DIAGNOSIS — M17.12 PRIMARY OSTEOARTHRITIS OF LEFT KNEE: Primary | ICD-10-CM

## 2022-01-10 PROCEDURE — 20610 DRAIN/INJ JOINT/BURSA W/O US: CPT | Performed by: PHYSICIAN ASSISTANT

## 2022-01-10 NOTE — PROGRESS NOTES
"    [facility] [place] Orthopaedic Surgery Clinic Note        Subjective     CC: Follow-up (6 week f/u Left Knee Pain, last cortisone injection 11/29/21)      SHAYNA Santiago is a 66 y.o. male.  Patient returns today for follow-up of left knee pain.  Pain and symptoms have been ongoing for years.  He did undergo corticosteroid injection 11/29/2021 which she reports 100% pain relief for approximately 4 weeks and then 10 days ago he started noticing increasing pain.  No recent history of injury or trauma.  He takes Celebrex daily.  He has been wearing a medial off  brace which does help for about 4 to 5 hours and then he begins having medial sided knee pain from where the brace is pushing in on his knee.    Current pain scale 7/10.  Associated symptoms swelling and stiffness.  Pain is worse with walking, standing, stair climbing, working and rising from a seated position.  Resting, ice, medication, use of a cane, lying down and elevating do help.    Overall, patient's symptoms are worsening over the last 10 days which is greatly affecting his quality of life and activities of daily living.    ROS:    Constiutional:Pt denies fever, chills, nausea, or vomiting.  MSK:as above        Objective      Past Medical History  Past Medical History:   Diagnosis Date   • Acute bronchitis    • Acute pharyngitis    • Conjunctivitis    • Hyperlipidemia    • Hypertension    • IBS (irritable bowel syndrome)    • Lumbago    • Rhinitis          Physical Exam  /98   Ht 180.3 cm (70.98\")   Wt 99.7 kg (219 lb 12.8 oz)   BMI 30.67 kg/m²     Body mass index is 30.67 kg/m².    Patient is well nourished and well developed.      Antalgic gait pattern      Ortho Exam  Integument:              Left knee: No skin lesions, no rash, no ecchymosis     Neurologic:              Motor:                          Left lower extremity: 5/5 quadriceps, hamstrings, ankle dorsiflexors, and ankle plantar flexors     Lower Extremities:              Left " knee:                          Tenderness:    Medial joint line                          Effusion:          Trace                          Swelling:          None                          Crepitus:          Positive                          Atrophy:           None                          Range of motion:        Extension:       5°                                                              Flexion:           120°                          Instability:        No varus laxity, no valgus laxity, negative anterior drawer                          Deformities:     Genu varum       Imaging/Labs/EMG Reviewed:  No new imaging today.      Assessment:  1. Primary osteoarthritis of left knee    2. Chronic pain of left knee        Plan:  1. Left knee osteoarthritis causing chronic pain.  Patient is a candidate for TKA but unfortunately he is not able to proceed at this time due to personal issues.  2. We discussed the use of viscosupplementation injections.  He is interested in trying those.  He was given his first injection today.  3. Patient is to continue use of his Celebrex for inflammation/pain control.  4. Recommend continued use of the medial off  brace but recommend removal when it starts to irritate the medial aspect of his knee.  5. Follow-up 1 week.  6. Questions and concerns answered.     After discussing risks of injection the patient gave consent to proceed.  He is left knee knee was confirmed as the correct joint to be injected with a timeout.  The knee was then prepped with Hibiclens and injected with a prefilled syringe of Orthovisc without any resistance using anterior lateral approach, patient in seated position.  The patient tolerated procedure well.  Hemostasis was achieved and a Band-Aid was applied over the injection site.  I instructed the patient on signs and symptoms of infection.  They should report to the ED if any of these develop.  Recommended modifying activity to include rest, ice,  elevation and/or heat along with oral pain medication as needed.         Gypsy Cote PA-C  01/10/22  14:41 EST      Dictated Utilizing Dragon Dictation.

## 2022-01-10 NOTE — PROGRESS NOTES

## 2022-01-17 ENCOUNTER — CLINICAL SUPPORT (OUTPATIENT)
Dept: ORTHOPEDIC SURGERY | Facility: CLINIC | Age: 67
End: 2022-01-17

## 2022-01-17 DIAGNOSIS — M17.12 PRIMARY OSTEOARTHRITIS OF LEFT KNEE: Primary | ICD-10-CM

## 2022-01-17 PROCEDURE — 20610 DRAIN/INJ JOINT/BURSA W/O US: CPT | Performed by: PHYSICIAN ASSISTANT

## 2022-01-17 NOTE — PROGRESS NOTES
Procedure   Large Joint Arthrocentesis: L knee  Date/Time: 1/17/2022 8:16 AM  Consent given by: patient  Site marked: site marked  Timeout: Immediately prior to procedure a time out was called to verify the correct patient, procedure, equipment, support staff and site/side marked as required   Supporting Documentation  Indications: pain   Procedure Details  Location: knee - L knee  Preparation: Patient was prepped and draped in the usual sterile fashion  Needle size: 22 G  Approach: anterolateral  Medications administered: 30 mg Hyaluronan 30 MG/2ML  Patient tolerance: patient tolerated the procedure well with no immediate complications

## 2022-01-17 NOTE — PROGRESS NOTES
CC: Follow-up left knee osteoarthritis, 2/3 Orthovisc injection today    History of present illness: Patient presents for his second Orthovisc injection to the left knee today.  At this time he denies any numbness or tingling into the distal extremity.  No fever, chills, night sweats or other constitutional symptoms.    Patient noted some improvement of pain symptoms for a couple of days following injection.    See chart for PMH, PSH, Meds, All - reviewed.    Ortho exam:  Left knee  Skin is intact without redness, warmth or swelling/effusion.  No lesions or evidence of infection noted.  Motor/sensory: Grossly intact L2-S1.    Assessment/plan:  Left knee osteoarthritis    Proceed today with 2/3 of Orthovisc injection.  Patient will follow-up in week for third injection.      After discussing risks of injection the patient gave consent to proceed.  His left knee was confirmed as the correct joint to be injected with a timeout.  The knee was then prepped with Hibiclens and injected with a prefilled syringe of Orthovisc without any resistance using anterior lateral approach, patient in seated position.  The patient tolerated procedure well.  Hemostasis was achieved and a Band-Aid was applied over the injection site.  I instructed the patient on signs and symptoms of infection.  They should report to the ED if any of these develop.  Recommended modifying activity to include rest, ice, elevation and/or heat along with oral pain medication as needed.

## 2022-01-24 ENCOUNTER — CLINICAL SUPPORT (OUTPATIENT)
Dept: ORTHOPEDIC SURGERY | Facility: CLINIC | Age: 67
End: 2022-01-24

## 2022-01-24 DIAGNOSIS — M17.12 PRIMARY OSTEOARTHRITIS OF LEFT KNEE: Primary | ICD-10-CM

## 2022-01-24 PROCEDURE — 20610 DRAIN/INJ JOINT/BURSA W/O US: CPT | Performed by: PHYSICIAN ASSISTANT

## 2022-01-24 NOTE — PROGRESS NOTES
Procedure   Large Joint Arthrocentesis: L knee  Date/Time: 1/24/2022 8:24 AM  Consent given by: patient  Site marked: site marked  Timeout: Immediately prior to procedure a time out was called to verify the correct patient, procedure, equipment, support staff and site/side marked as required   Supporting Documentation  Indications: pain   Procedure Details  Location: knee - L knee  Preparation: Patient was prepped and draped in the usual sterile fashion  Needle size: 22 G  Approach: anterolateral  Medications administered: 30 mg Hyaluronan 30 MG/2ML  Patient tolerance: patient tolerated the procedure well with no immediate complications

## 2022-01-24 NOTE — PROGRESS NOTES
CC: Follow-up left knee osteoarthritis, 3/3 Orthovisc injection today     History of present illness: Patient presents for his third Orthovisc injection to the left knee today.  At this time he denies any numbness or tingling into the distal extremity.  No fever, chills, night sweats or other constitutional symptoms.     Patient continues to have improvement following first 2 injections.     See chart for PMH, PSH, Meds, All - reviewed.     Ortho exam:  Left knee  Skin is intact without redness, warmth or swelling/effusion.  No lesions or evidence of infection noted.  Motor/sensory: Grossly intact L2-S1.     Assessment/plan:  Left knee osteoarthritis     Proceed today with 3/3 of Orthovisc injection.  Patient will follow-up in 3 months.  If he still doing well he can cancel.  If he would like to proceed with another set of Visco gel injections he will have to wait proximately 6 months from today's injection.       After discussing risks of injection the patient gave consent to proceed.  His left knee was confirmed as the correct joint to be injected with a timeout.  The knee was then prepped with Hibiclens and injected with a prefilled syringe of Orthovisc without any resistance using anterior lateral approach, patient in seated position.  The patient tolerated procedure well.  Hemostasis was achieved and a Band-Aid was applied over the injection site.  I instructed the patient on signs and symptoms of infection.  They should report to the ED if any of these develop.  Recommended modifying activity to include rest, ice, elevation and/or heat along with oral pain medication as needed.

## 2022-01-30 DIAGNOSIS — I10 ESSENTIAL HYPERTENSION, BENIGN: ICD-10-CM

## 2022-01-31 ENCOUNTER — TELEPHONE (OUTPATIENT)
Dept: FAMILY MEDICINE CLINIC | Facility: CLINIC | Age: 67
End: 2022-01-31

## 2022-01-31 DIAGNOSIS — K62.5 RECTAL BLEEDING: Primary | ICD-10-CM

## 2022-01-31 RX ORDER — VALSARTAN 160 MG/1
160 TABLET ORAL DAILY
Qty: 30 TABLET | Refills: 1 | OUTPATIENT
Start: 2022-01-31

## 2022-01-31 RX ORDER — VALSARTAN 160 MG/1
TABLET ORAL
Qty: 30 TABLET | Refills: 1 | Status: SHIPPED | OUTPATIENT
Start: 2022-01-31 | End: 2022-04-11

## 2022-01-31 NOTE — TELEPHONE ENCOUNTER
----- Message from Chava Santiago sent at 1/30/2022  7:30 PM EST -----  Regarding: Blood In Stool  For the last few months when I have a bowel movement, there is a very red blood content along with the initial and secondary bowel movement. At first I thought it was because of hemorrhoid enlargement. But when this happens my stomach also gets very upset. Doctor Panchito Soliz has me on Colestipol for diarrhea and I also take other OTC diarrhea medicine in order to stop the diarrhea. Need to know what to do about the blood in my stool. Thank you.

## 2022-02-10 ENCOUNTER — HOSPITAL ENCOUNTER (OUTPATIENT)
Dept: NEUROLOGY | Facility: HOSPITAL | Age: 67
Discharge: HOME | End: 2022-02-10
Admitting: ORTHOPAEDIC SURGERY

## 2022-02-10 ENCOUNTER — TELEPHONE (OUTPATIENT)
Dept: FAMILY MEDICINE CLINIC | Facility: CLINIC | Age: 67
End: 2022-02-10

## 2022-02-10 DIAGNOSIS — G56.21 CUBITAL TUNNEL SYNDROME ON RIGHT: ICD-10-CM

## 2022-02-10 DIAGNOSIS — G56.22 CUBITAL TUNNEL SYNDROME ON LEFT: ICD-10-CM

## 2022-02-10 PROCEDURE — 95910 NRV CNDJ TEST 7-8 STUDIES: CPT

## 2022-02-10 PROCEDURE — 95886 MUSC TEST DONE W/N TEST COMP: CPT

## 2022-02-10 RX ORDER — CYCLOBENZAPRINE HCL 10 MG
10 TABLET ORAL NIGHTLY PRN
Qty: 30 TABLET | Refills: 1 | Status: SHIPPED | OUTPATIENT
Start: 2022-02-10 | End: 2022-04-13

## 2022-02-10 NOTE — TELEPHONE ENCOUNTER
----- Message from Chava Santiago sent at 2/6/2022  4:32 PM EST -----  Regarding: Celecoxib  Back in December I say Dr. Gama Murillo about pain in my elbows radiating to numbness in my hands. This was both arms and I had talked to Dr. Bangura about this issue earlier. Dr. Murillo along with Dr. Bangura wanted me to schedule a nerve conductive test. When I called in December I was informed that they were 2 months behind in these test. I am scheduled for this test on 2/10/2022.  The pain has now radiated into my shoulders and neck. It is a akins to get through the day. I have a lot of trouble sleeping at night because I can not sleep on my sides because that flares up my arms and sleeping on my back so long brings pain to my back.  I am desperate, so I decided to take 1 Celecoxib pill in the morning and 1 at bedtime. This has help some and I am able to get some sleep at night. I read the side effects and was wondering if Dr. Bangura can prescribe something else to help relieve this situation or should I keep doing what I am doing until I see Mr. Murillo. Thank you for your time.

## 2022-02-10 NOTE — TELEPHONE ENCOUNTER
Do not take 2 celecoxib tablets a day, just 1 each morning.  I will call in a muscle relaxer to use at night.

## 2022-02-15 ENCOUNTER — OFFICE VISIT (OUTPATIENT)
Dept: ORTHOPEDIC SURGERY | Facility: CLINIC | Age: 67
End: 2022-02-15

## 2022-02-15 VITALS
WEIGHT: 224 LBS | HEIGHT: 71 IN | BODY MASS INDEX: 31.36 KG/M2 | SYSTOLIC BLOOD PRESSURE: 120 MMHG | DIASTOLIC BLOOD PRESSURE: 80 MMHG

## 2022-02-15 DIAGNOSIS — G56.01 CARPAL TUNNEL SYNDROME ON RIGHT: ICD-10-CM

## 2022-02-15 DIAGNOSIS — G56.02 CARPAL TUNNEL SYNDROME ON LEFT: Primary | ICD-10-CM

## 2022-02-15 PROCEDURE — 99214 OFFICE O/P EST MOD 30 MIN: CPT | Performed by: ORTHOPAEDIC SURGERY

## 2022-02-15 RX ORDER — PREDNISOLONE ACETATE 10 MG/ML
SUSPENSION/ DROPS OPHTHALMIC
COMMUNITY
Start: 2022-02-06 | End: 2022-08-30

## 2022-02-15 NOTE — ADDENDUM NOTE
Addended by: ENEDELIA FRANCO on: 2/15/2022 01:20 PM     Modules accepted: Orders, Level of Service

## 2022-02-15 NOTE — PROGRESS NOTES
[facility] Orthopaedic Surgery Office Follow Up       Office Follow Up Visit       Patient Name: Chava Santiago    Chief Complaint:   Chief Complaint   Patient presents with   • Follow-up     f/u Bilat Elbows after EMG 2/10/22       Referring Physician: No ref. provider found    History of Present Illness:   It has been 2  month(s) since Chava Santiago's last visit. Chava Santiago returns to clinic today for F/U: follow-up of bilateralBody Part: elbowReason: pain. L>R. The issue has been ongoing for 6 month(s). Chava Santiago rates HIS/HER: hispain at 7/10 on the pain scale. Previous/current treatments: NSAIDS. Current symptoms:Symptoms: pain and stiffness. The pain is worse with any movement of the joint; nothing improves the pain. Overall, he/she: heis doing worse.  I have reviewed the patient's history of present illness as noted/entered above.    I have reviewed the patient's past medical history, surgical history, social history, family history, medications, and allergies as noted in the electronic medical record and as noted/entered.  I have reviewed the patient's review of systems as noted/enter and updated as noted in the patient's HPI.    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  His EMG/NCV was completed looks like he has median neuropathy at the wrists bilaterally evidence of severe carpal tunnel  syndrome.    Upon further evaluation he does have some elbow pain but the ulnar nerve is negative on the EMG/NCV    I counseled the patient on the findings that it does not appear to be his ulnar nerve at all appears to be his carpal tunnel median nerve.  Discussed case with Dr. Miguel Hutchins as well.    Patient does have some elbow pain that he notes fairly comprehensive global numbness tingling but it does appear today to be more clear on the median side of the hand      Subjective   Subjective      Review of Systems   Musculoskeletal: Positive for arthralgias.   All other systems reviewed and are negative.       Past Medical History:   Past Medical History:   Diagnosis Date   • Acute bronchitis    • Acute pharyngitis    • Conjunctivitis    • Hyperlipidemia    • Hypertension    • IBS (irritable bowel syndrome)    • Lumbago    • Rhinitis        Past Surgical History:   Past Surgical History:   Procedure Laterality Date   • EYE SURGERY Bilateral     multiple on both eyes   • HERNIA REPAIR Right     x2   • KNEE SURGERY Left     arthroscopy with meniscal repair- 10 years ago       Family History:   Family History   Adopted: Yes       Social History:   Social History     Socioeconomic History   • Marital status:    Tobacco Use   • Smoking status: Former Smoker   • Smokeless tobacco: Never Used   Substance and Sexual Activity   • Alcohol use: Yes     Comment: occ   • Drug use: No       Medications:   Current Outpatient Medications:   •  celecoxib (CeleBREX) 200 MG capsule, TAKE ONE CAPSULE BY MOUTH DAILY, Disp: 30 capsule, Rfl: 5  •  colestipol (COLESTID) 1 g tablet, Take 1 tablet by mouth 2 (Two) Times a Day As Needed (diarrhea)., Disp: 60 tablet, Rfl: 11  •  cyclobenzaprine (FLEXERIL) 10 MG tablet, Take 1 tablet by mouth At Night As Needed (for pain)., Disp: 30 tablet, Rfl: 1  •  hydroCHLOROthiazide (HYDRODIURIL) 25 MG tablet, Take 1 tablet by mouth Daily., Disp: 90 tablet, Rfl: 3  •  omeprazole (priLOSEC) 40  "MG capsule, Take 1 capsule by mouth Daily., Disp: 30 capsule, Rfl: 11  •  pravastatin (PRAVACHOL) 40 MG tablet, Take 1 tablet by mouth Daily., Disp: 90 tablet, Rfl: 3  •  prednisoLONE acetate (PRED FORTE) 1 % ophthalmic suspension, , Disp: , Rfl:   •  valsartan (DIOVAN) 160 MG tablet, TAKE ONE TABLET BY MOUTH DAILY, Disp: 30 tablet, Rfl: 1    Allergies: No Known Allergies    The following portions of the patient's history were reviewed and updated as appropriate: allergies, current medications, past family history, past medical history, past social history, past surgical history and problem list.        Objective    Objective      Vital Signs:   Vitals:    02/15/22 0835   BP: 120/80   Weight: 102 kg (224 lb)   Height: 180.3 cm (70.98\")       Ortho Exam:  Bilateral carpal tunnel syndrome with median nerve compression test today symptomatically more on the left than the right.  Counseled any associated elbow pain would be unrelated to carpal tunnel syndrome.    Results Review:  Imaging Results (Last 24 Hours)     ** No results found for the last 24 hours. **          EMG & Nerve Conduction Test    Result Date: 2/10/2022  Median neuropathy at the wrist bilaterally, severe (carpal tunnel) Normal ulnar motor and sensory studies of both arms, with no electrophysiologic evidence for ulnar nerve entrapment seen This report is transcribed using the Dragon dictation system.       I reviewed the findings above    Procedures            Assessment / Plan      Assessment/Plan:   Problem List Items Addressed This Visit        Neuro    Carpal tunnel syndrome on left - Primary    Carpal tunnel syndrome on right          Today left worse than right carpal tunnel syndrome.  The EMG/NCV help to delineate his fairly global numbness and tingling.  Counseled him about the median nerve and what that would contribute to with regards to pain and symptomatology of his hand.    Discussed case with Dr. Miguel Hutchins.    Follow Up: I appreciate " the assistance of Dr. Miguel Hutchins.    No charge for my visit today Dr. Hutchins saw the patient and counseled on surgical versus nonsurgical measures.  Dr. Miguel Hutchins will charge for today's visit.      Kyaw Murillo MD, FAAOS  Orthopedic Surgeon  Fellowship Trained Shoulder and Elbow Surgeon  [facility]  Orthopedics and Sports Medicine  [address], [address]  [place] [address]    02/15/22  09:48 EST           Addendum: 66-year-old right-hand-dominant male who used to play guitar but now his hands bother him enough that he has stopped doing this.  He has left greater than right numbness and tingling in his hands that has bothered him for at least 7 months.  He has failed a course of bracing.  He wakes up with both of his hands numb and tingly.  He has had nerve studies ordered by Dr. Murillo recently and I have been asked to see the patient because of his severe carpal tunnel syndrome.      His examination shows positive carpal tunnel compression test and positive Phalen's bilaterally.  No obvious thenar wasting.  He has 4+ out of 5 APB bilaterally.  Nerve studies are reviewed which show median nerve motor latency of 12.0 ms on the right and 7.1 ms on the left.  Interpretation is severe bilateral carpal tunnel syndrome.    Assessment and plan:    Bilateral severe carpal tunnel syndrome.  Subjectively, his symptoms are worse on the left than the right.  Objectively, right side is worse than the left.  We discussed the risk, benefits, potential hazards, typical recovery and rehab as well as reason alternatives to right carpal tunnel release.  If the patient gets meaningful relief, then we will plan on releasing his left carpal tunnel 3 weeks later.  Patient has been told that any compression of the nerve that has not caused significant damage to the nerve should feel better immediately.  Any chronic damage to the nerve, however, can take up to 1 year to recover before he knows how  much or how little relief he will have gotten from the surgery.  Patient understands and would like to proceed with surgery.

## 2022-02-26 DIAGNOSIS — K58.2 IRRITABLE BOWEL SYNDROME WITH BOTH CONSTIPATION AND DIARRHEA: ICD-10-CM

## 2022-02-28 RX ORDER — DICYCLOMINE HCL 20 MG
TABLET ORAL
Qty: 60 TABLET | Refills: 10 | Status: SHIPPED | OUTPATIENT
Start: 2022-02-28

## 2022-03-02 ENCOUNTER — TELEPHONE (OUTPATIENT)
Dept: ORTHOPEDIC SURGERY | Facility: CLINIC | Age: 67
End: 2022-03-02

## 2022-03-08 ENCOUNTER — LAB (OUTPATIENT)
Dept: PREADMISSION TESTING | Facility: HOSPITAL | Age: 67
End: 2022-03-08

## 2022-03-08 DIAGNOSIS — G56.01 CARPAL TUNNEL SYNDROME ON RIGHT: ICD-10-CM

## 2022-03-08 PROCEDURE — C9803 HOPD COVID-19 SPEC COLLECT: HCPCS

## 2022-03-08 PROCEDURE — U0005 INFEC AGEN DETEC AMPLI PROBE: HCPCS

## 2022-03-08 PROCEDURE — U0004 COV-19 TEST NON-CDC HGH THRU: HCPCS

## 2022-03-09 LAB — SARS-COV-2 RNA PNL SPEC NAA+PROBE: NOT DETECTED

## 2022-03-11 DIAGNOSIS — G56.02 CARPAL TUNNEL SYNDROME ON LEFT: Primary | ICD-10-CM

## 2022-03-11 RX ORDER — ONDANSETRON 4 MG/1
4 TABLET, FILM COATED ORAL EVERY 8 HOURS PRN
Qty: 10 TABLET | Refills: 1 | Status: SHIPPED | OUTPATIENT
Start: 2022-03-11 | End: 2022-04-08

## 2022-03-11 RX ORDER — DOCUSATE SODIUM 100 MG/1
100 CAPSULE, LIQUID FILLED ORAL 2 TIMES DAILY PRN
Qty: 62 CAPSULE | Refills: 0 | Status: SHIPPED | OUTPATIENT
Start: 2022-03-11 | End: 2022-09-11 | Stop reason: SDUPTHER

## 2022-03-11 RX ORDER — HYDROCODONE BITARTRATE AND ACETAMINOPHEN 5; 325 MG/1; MG/1
1 TABLET ORAL EVERY 6 HOURS PRN
Qty: 15 TABLET | Refills: 0 | Status: SHIPPED | OUTPATIENT
Start: 2022-03-11 | End: 2022-04-08

## 2022-03-29 ENCOUNTER — OFFICE VISIT (OUTPATIENT)
Dept: ORTHOPEDIC SURGERY | Facility: CLINIC | Age: 67
End: 2022-03-29

## 2022-03-29 VITALS — TEMPERATURE: 97.3 F

## 2022-03-29 DIAGNOSIS — G56.02 CARPAL TUNNEL SYNDROME ON LEFT: Primary | ICD-10-CM

## 2022-03-29 PROCEDURE — 99024 POSTOP FOLLOW-UP VISIT: CPT | Performed by: ORTHOPAEDIC SURGERY

## 2022-03-29 NOTE — PROGRESS NOTES
Answers for HPI/ROS submitted by the patient on 3/23/2022  What is the primary reason for your visit?: Other  Please describe your symptoms.: Follow up on left wrist surgery  Have you had these symptoms before?: Yes  How long have you been having these symptoms?: Greater than 2 weeks  Please list any medications you are currently taking for this condition.: ibuprofen  Please describe any probable cause for these symptoms. : Numbness in hand pain in [facility] Orthopaedic Surgery Clinic Note        Subjective     Post-op (2 weeks post Left CTR 3/11/22)       SHAYNA Santiago is a 66 y.o. male.  Patient returns in 2 weeks after left carpal tunnel release on 3/11/2022.  He is doing much better overall.  No further numbness or tingling.          Objective      Physical Exam  Temp 97.3 °F (36.3 °C)     There is no height or weight on file to calculate BMI.        Ortho Exam  Incision is healing and free of erythema or drainage  Patient is full tip to palm flexion    Imaging Reviewed:  Imaging Results (Last 24 Hours)     ** No results found for the last 24 hours. **            Assessment    Assessment:  1. Carpal tunnel syndrome on left        Plan:  1. Status post carpal tunnel release on the left--patient is doing great.  He is scheduled to have the right side release on 4/8/2022.  I will see him back for both issues on 4/21/2022.  Suture removal today.      Ra Hutchins MD  03/29/22  14:00 EDT      Dictated Utilizing Dragon Dictation.

## 2022-04-05 ENCOUNTER — LAB (OUTPATIENT)
Dept: PREADMISSION TESTING | Facility: HOSPITAL | Age: 67
End: 2022-04-05

## 2022-04-05 DIAGNOSIS — G56.02 CARPAL TUNNEL SYNDROME ON LEFT: ICD-10-CM

## 2022-04-05 LAB — SARS-COV-2 RNA PNL SPEC NAA+PROBE: NOT DETECTED

## 2022-04-05 PROCEDURE — C9803 HOPD COVID-19 SPEC COLLECT: HCPCS

## 2022-04-05 PROCEDURE — U0004 COV-19 TEST NON-CDC HGH THRU: HCPCS

## 2022-04-05 PROCEDURE — U0005 INFEC AGEN DETEC AMPLI PROBE: HCPCS

## 2022-04-08 ENCOUNTER — OUTSIDE FACILITY SERVICE (OUTPATIENT)
Dept: ORTHOPEDIC SURGERY | Facility: CLINIC | Age: 67
End: 2022-04-08

## 2022-04-08 DIAGNOSIS — I10 ESSENTIAL HYPERTENSION, BENIGN: ICD-10-CM

## 2022-04-08 DIAGNOSIS — G56.01 CARPAL TUNNEL SYNDROME ON RIGHT: Primary | ICD-10-CM

## 2022-04-08 PROCEDURE — 64721 CARPAL TUNNEL SURGERY: CPT | Performed by: ORTHOPAEDIC SURGERY

## 2022-04-08 RX ORDER — DOCUSATE SODIUM 100 MG/1
100 CAPSULE, LIQUID FILLED ORAL 2 TIMES DAILY PRN
Qty: 62 CAPSULE | Refills: 0 | Status: SHIPPED | OUTPATIENT
Start: 2022-04-08

## 2022-04-08 RX ORDER — HYDROCODONE BITARTRATE AND ACETAMINOPHEN 5; 325 MG/1; MG/1
1 TABLET ORAL EVERY 6 HOURS PRN
Qty: 15 TABLET | Refills: 0 | Status: SHIPPED | OUTPATIENT
Start: 2022-04-08 | End: 2022-12-12

## 2022-04-08 RX ORDER — ONDANSETRON 4 MG/1
4 TABLET, FILM COATED ORAL EVERY 8 HOURS PRN
Qty: 10 TABLET | Refills: 1 | Status: SHIPPED | OUTPATIENT
Start: 2022-04-08

## 2022-04-11 RX ORDER — VALSARTAN 160 MG/1
TABLET ORAL
Qty: 30 TABLET | Refills: 1 | Status: SHIPPED | OUTPATIENT
Start: 2022-04-11 | End: 2022-05-23

## 2022-04-13 RX ORDER — CYCLOBENZAPRINE HCL 10 MG
TABLET ORAL
Qty: 30 TABLET | Refills: 1 | Status: SHIPPED | OUTPATIENT
Start: 2022-04-13 | End: 2022-12-01

## 2022-04-21 ENCOUNTER — OFFICE VISIT (OUTPATIENT)
Dept: ORTHOPEDIC SURGERY | Facility: CLINIC | Age: 67
End: 2022-04-21

## 2022-04-21 VITALS — TEMPERATURE: 98.2 F

## 2022-04-21 DIAGNOSIS — G56.01 CARPAL TUNNEL SYNDROME ON RIGHT: Primary | ICD-10-CM

## 2022-04-21 PROCEDURE — 99024 POSTOP FOLLOW-UP VISIT: CPT | Performed by: ORTHOPAEDIC SURGERY

## 2022-04-21 NOTE — PROGRESS NOTES
[facility] [place]        Subjective     Post-op (3 week follow up -- 6 week post Left CTR 3/11/22; 2 week s/p Right CTR 4/8/22 )       SHAYNA Santiago is a 66 y.o. male.  Patient is now 13 days out from right carpal tunnel release.  He is doing well overall.  Numbness and tingling have subsided.  Left side is also doing well.          Objective      Physical Exam  Temp 98.2 °F (36.8 °C)     There is no height or weight on file to calculate BMI.        Ortho Exam  Incision is healing and free of erythema or drainage.  Patient has full tip to palm flexion.    Imaging Reviewed:  Imaging Results (Last 24 Hours)     ** No results found for the last 24 hours. **            Assessment    Assessment:  1. Carpal tunnel syndrome on right        Plan:  1. Status post right carpal tunnel release--suture removal today.  Patient is doing well enough that I think we can watch him for now.  He will come back or call back if he is having any problems.  He has done well overall.      Ra Hutchins MD  04/21/22  17:50 EDT      Dictated Utilizing Dragon Dictation.

## 2022-04-27 ENCOUNTER — OFFICE VISIT (OUTPATIENT)
Dept: ORTHOPEDIC SURGERY | Facility: CLINIC | Age: 67
End: 2022-04-27

## 2022-04-27 VITALS
SYSTOLIC BLOOD PRESSURE: 124 MMHG | HEIGHT: 71 IN | DIASTOLIC BLOOD PRESSURE: 84 MMHG | BODY MASS INDEX: 30.55 KG/M2 | WEIGHT: 218.2 LBS

## 2022-04-27 DIAGNOSIS — M17.12 PRIMARY OSTEOARTHRITIS OF LEFT KNEE: Primary | ICD-10-CM

## 2022-04-27 PROCEDURE — 20610 DRAIN/INJ JOINT/BURSA W/O US: CPT | Performed by: ORTHOPAEDIC SURGERY

## 2022-04-27 RX ORDER — TRIAMCINOLONE ACETONIDE 40 MG/ML
40 INJECTION, SUSPENSION INTRA-ARTICULAR; INTRAMUSCULAR
Status: COMPLETED | OUTPATIENT
Start: 2022-04-27 | End: 2022-04-27

## 2022-04-27 RX ORDER — LIDOCAINE HYDROCHLORIDE 10 MG/ML
4 INJECTION, SOLUTION EPIDURAL; INFILTRATION; INTRACAUDAL; PERINEURAL
Status: COMPLETED | OUTPATIENT
Start: 2022-04-27 | End: 2022-04-27

## 2022-04-27 RX ADMIN — LIDOCAINE HYDROCHLORIDE 4 ML: 10 INJECTION, SOLUTION EPIDURAL; INFILTRATION; INTRACAUDAL; PERINEURAL at 13:35

## 2022-04-27 RX ADMIN — TRIAMCINOLONE ACETONIDE 40 MG: 40 INJECTION, SUSPENSION INTRA-ARTICULAR; INTRAMUSCULAR at 13:35

## 2022-04-27 NOTE — PROGRESS NOTES
"      [facility] [place] Orthopaedic Surgery Clinic Note    Subjective     CC: Follow-up (2.5 year f/u Osteoarthritis left knee)      SHAYNA Santiago is a 66 y.o. male.  He had Orthovisc injections in January.  He is not much better.  Like to try another option.  He understands he has to wait 6 months to get Orthovisc again    Review of Systems   Constitutional: Negative.  Negative for chills, fatigue and fever.   HENT: Negative.  Negative for congestion and dental problem.    Eyes: Negative.  Negative for blurred vision.   Respiratory: Negative.  Negative for shortness of breath.    Cardiovascular: Negative.  Negative for leg swelling.   Gastrointestinal: Negative.  Negative for abdominal pain.   Endocrine: Negative.  Negative for polyuria.   Genitourinary: Negative.  Negative for difficulty urinating.   Musculoskeletal: Positive for arthralgias.   Skin: Negative.    Allergic/Immunologic: Negative.    Neurological: Negative.    Hematological: Negative.  Negative for adenopathy.   Psychiatric/Behavioral: Negative.  Negative for behavioral problems.       ROS:    Constiutional:Pt denies fever, chills, nausea, or vomiting.  MSK:as above      Objective      Past Medical History  Past Medical History:   Diagnosis Date   • Acute bronchitis    • Acute pharyngitis    • Conjunctivitis    • CTS (carpal tunnel syndrome)    • Hyperlipidemia    • Hypertension    • IBS (irritable bowel syndrome)    • Knee swelling    • Lumbago    • Rhinitis    • Tear of meniscus of knee          Physical Exam  /84   Ht 180.3 cm (70.98\")   Wt 99 kg (218 lb 3.2 oz)   BMI 30.45 kg/m²     Body mass index is 30.45 kg/m².    Patient is well nourished and well developed.        Ortho Exam  No change in left knee exam.  Trace effusion.  Varus deformity.    Imaging/Labs/EMG Reviewed:  Imaging Results (Last 24 Hours)     ** No results found for the last 24 hours. **        No valid procedures specified.     Assessment:  1. Primary osteoarthritis of left " knee        Plan:  1. Recommend over the counter anti-inflammatories for pain and/or swelling  2. I injected his left knee with cortisone.  He well follow-up as needed.  He may get Orthovisc in 6 months from his previous injections    Follow Up:   Return if symptoms worsen or fail to improve.            Raúl Bhatti M.D., [facility]  Orthopedic Surgeon  Fellowship Trained Sports Medicine  [facility]  Orthopedics and Sports Medicine  [address], [address]  [place] [address]    EMR Dragon/Transcription disclaimer:  Much of this encounter note is an electronic transcription of spoken language to printed text. Electronic transcription of spoken language may permit erroneous, or at times, nonsensical words or phrases to be inadvertently transcribed. Although I have reviewed the note for such errors, some may still exist.

## 2022-04-27 NOTE — PROGRESS NOTES
Procedure   Large Joint Arthrocentesis: L knee  Date/Time: 4/27/2022 1:35 PM  Consent given by: patient  Site marked: site marked  Timeout: Immediately prior to procedure a time out was called to verify the correct patient, procedure, equipment, support staff and site/side marked as required   Procedure Details  Location: knee - L knee  Preparation: Patient was prepped and draped in the usual sterile fashion  Needle size: 22 G  Approach: anterolateral  Medications administered: 4 mL lidocaine PF 1% 1 %; 40 mg triamcinolone acetonide 40 MG/ML (1cc Marcaine .75% NDC: 6822-0119-73: LOT [id number]: EXP 04/01/2023)  Patient tolerance: patient tolerated the procedure well with no immediate complications

## 2022-05-20 DIAGNOSIS — I10 ESSENTIAL HYPERTENSION, BENIGN: ICD-10-CM

## 2022-05-23 RX ORDER — VALSARTAN 160 MG/1
TABLET ORAL
Qty: 30 TABLET | Refills: 11 | Status: SHIPPED | OUTPATIENT
Start: 2022-05-23

## 2022-06-19 DIAGNOSIS — M79.2 NEURALGIA OF RIGHT UPPER EXTREMITY: ICD-10-CM

## 2022-06-20 RX ORDER — CELECOXIB 200 MG/1
CAPSULE ORAL
Qty: 90 CAPSULE | Refills: 3 | Status: SHIPPED | OUTPATIENT
Start: 2022-06-20 | End: 2022-08-10

## 2022-07-14 DIAGNOSIS — I10 ESSENTIAL HYPERTENSION, BENIGN: ICD-10-CM

## 2022-07-15 RX ORDER — HYDROCHLOROTHIAZIDE 25 MG/1
TABLET ORAL
Qty: 90 TABLET | Refills: 3 | Status: SHIPPED | OUTPATIENT
Start: 2022-07-15 | End: 2022-08-01 | Stop reason: SDUPTHER

## 2022-07-25 ENCOUNTER — TELEPHONE (OUTPATIENT)
Dept: ORTHOPEDIC SURGERY | Facility: CLINIC | Age: 67
End: 2022-07-25

## 2022-08-01 DIAGNOSIS — E78.2 MIXED HYPERLIPIDEMIA: ICD-10-CM

## 2022-08-01 DIAGNOSIS — I10 ESSENTIAL HYPERTENSION, BENIGN: ICD-10-CM

## 2022-08-01 RX ORDER — PRAVASTATIN SODIUM 40 MG
TABLET ORAL
Qty: 90 TABLET | Refills: 3 | Status: SHIPPED | OUTPATIENT
Start: 2022-08-01

## 2022-08-01 NOTE — TELEPHONE ENCOUNTER
Rx Refill Note  Requested Prescriptions     Pending Prescriptions Disp Refills   • pravastatin (PRAVACHOL) 40 MG tablet [Pharmacy Med Name: PRAVASTATIN SODIUM 40 MG TAB] 90 tablet 3     Sig: TAKE ONE TABLET BY MOUTH DAILY      Last office visit with prescribing clinician: 11/23/2021      Next office visit with prescribing clinician: Visit date not found        5/19/2020    Emily Spring MA  08/01/22, 10:03 EDT

## 2022-08-02 RX ORDER — HYDROCHLOROTHIAZIDE 25 MG/1
25 TABLET ORAL DAILY
Qty: 90 TABLET | Refills: 3 | Status: SHIPPED | OUTPATIENT
Start: 2022-08-02

## 2022-08-08 ENCOUNTER — TELEPHONE (OUTPATIENT)
Dept: FAMILY MEDICINE CLINIC | Facility: CLINIC | Age: 67
End: 2022-08-08

## 2022-08-08 DIAGNOSIS — M25.50 ARTHRALGIA, UNSPECIFIED JOINT: ICD-10-CM

## 2022-08-08 DIAGNOSIS — M79.2 NEURALGIA OF RIGHT UPPER EXTREMITY: Primary | ICD-10-CM

## 2022-08-08 RX ORDER — TRAMADOL HYDROCHLORIDE 50 MG/1
50 TABLET ORAL 2 TIMES DAILY PRN
Qty: 60 TABLET | Refills: 1 | Status: SHIPPED | OUTPATIENT
Start: 2022-08-08 | End: 2022-08-30 | Stop reason: SDUPTHER

## 2022-08-08 NOTE — TELEPHONE ENCOUNTER
----- Message from Chava Santiago sent at 8/7/2022  1:57 PM EDT -----  Regarding: Pain Medication  I was wondering if Dr. Bangura can prescribe a more effective pain medicine for me. With my bad knee effecting my left hip, back and right shoulder from cane use, the CELECOXIB really isn't working that well. I do a lot of walking in my jobs and something more powerful for pain would really help. Thank you have a good day.

## 2022-08-10 ENCOUNTER — OFFICE VISIT (OUTPATIENT)
Dept: ORTHOPEDIC SURGERY | Facility: CLINIC | Age: 67
End: 2022-08-10

## 2022-08-10 VITALS — BODY MASS INDEX: 30.56 KG/M2 | WEIGHT: 218.26 LBS | HEIGHT: 71 IN

## 2022-08-10 DIAGNOSIS — M17.12 PRIMARY OSTEOARTHRITIS OF LEFT KNEE: Primary | ICD-10-CM

## 2022-08-10 PROCEDURE — 99214 OFFICE O/P EST MOD 30 MIN: CPT | Performed by: ORTHOPAEDIC SURGERY

## 2022-08-10 RX ORDER — MELOXICAM 15 MG/1
TABLET ORAL
Qty: 90 TABLET | Refills: 2 | Status: SHIPPED | OUTPATIENT
Start: 2022-08-10 | End: 2022-10-12

## 2022-08-10 NOTE — PROGRESS NOTES
"      [facility] [place] [facility]opa[facility] Clinic Note    Subjective     CC: Follow-up (3 months- Primary osteoarthritis of left knee)      SHAYNA Santiago is a 66 y.o. male.  The cortisone injection only helped for 2 months.  He would like to try something different.  He has been on Celebrex.  He has had joint gel in the past which has helped.  It has been more than 6 months.    Review of Systems   Constitutional: Negative.  Negative for chills, fatigue and fever.   HENT: Negative.  Negative for congestion and dental problem.    Eyes: Negative.  Negative for blurred vision.   Respiratory: Negative.  Negative for shortness of breath.    Cardiovascular: Negative.  Negative for leg swelling.   Gastrointestinal: Negative.  Negative for abdominal pain.   Endocrine: Negative.  Negative for polyuria.   Genitourinary: Negative.  Negative for difficulty urinating.   Musculoskeletal: Positive for arthralgias.   Skin: Negative.    Allergic/Immunologic: Negative.    Neurological: Negative.    Hematological: Negative.  Negative for adenopathy.   Psychiatric/Behavioral: Negative.  Negative for behavioral problems.       ROS:    Constiutional:Pt denies fever, chills, nausea, or vomiting.  MSK:as above      Objective      Past Medical History  Past Medical History:   Diagnosis Date   • Acute bronchitis    • Acute pharyngitis    • Conjunctivitis    • CTS (carpal tunnel syndrome)    • Hyperlipidemia    • Hypertension    • IBS (irritable bowel syndrome)    • Knee swelling    • Lumbago    • Rhinitis    • Tear of meniscus of knee          Physical Exam  Ht 180.3 cm (70.98\")   Wt 99 kg (218 lb 4.1 oz)   BMI 30.45 kg/m²     Body mass index is 30.45 kg/m².    Patient is well nourished and well developed.        Ortho Exam  No change in left knee exam    Imaging/Labs/EMG Reviewed:  Imaging Results (Last 24 Hours)     ** No results found for the last 24 hours. **        No valid procedures specified.     Assessment:  1. Primary osteoarthritis of " left knee        Plan:  1. I have ordered meloxicam for him to take daily for inflammation  2. I have ordered Orthovisc for him as he has failed cortisone injections anti-inflammatories and did well with previous Orthovisc    Follow Up:   Return for for orthovisc.      Medical Decision Making  Management Options : Moderate - RX Drug management         Raúl Bhatti M.D., [facility]  Orthopedic Surgeon  Fellowship Trained Sports Medicine  Psychiatric  Orthopedics and Sports Medicine  [facility], [address]  [place] [address]    EMR Dragon/Transcription disclaimer:  Much of this encounter note is an electronic transcription of spoken language to printed text. Electronic transcription of spoken language may permit erroneous, or at times, nonsensical words or phrases to be inadvertently transcribed. Although I have reviewed the note for such errors, some may still exist.

## 2022-08-30 ENCOUNTER — LAB (OUTPATIENT)
Dept: LAB | Facility: HOSPITAL | Age: 67
End: 2022-08-30

## 2022-08-30 ENCOUNTER — OFFICE VISIT (OUTPATIENT)
Dept: FAMILY MEDICINE CLINIC | Facility: CLINIC | Age: 67
End: 2022-08-30

## 2022-08-30 VITALS
OXYGEN SATURATION: 96 % | WEIGHT: 221 LBS | DIASTOLIC BLOOD PRESSURE: 88 MMHG | SYSTOLIC BLOOD PRESSURE: 136 MMHG | HEIGHT: 71 IN | BODY MASS INDEX: 30.94 KG/M2 | HEART RATE: 59 BPM | RESPIRATION RATE: 16 BRPM

## 2022-08-30 DIAGNOSIS — Z12.5 PROSTATE CANCER SCREENING: ICD-10-CM

## 2022-08-30 DIAGNOSIS — I10 ESSENTIAL HYPERTENSION, BENIGN: ICD-10-CM

## 2022-08-30 DIAGNOSIS — R19.7 DIARRHEA, UNSPECIFIED TYPE: ICD-10-CM

## 2022-08-30 DIAGNOSIS — M79.2 NEURALGIA OF RIGHT UPPER EXTREMITY: ICD-10-CM

## 2022-08-30 DIAGNOSIS — E78.2 MIXED HYPERLIPIDEMIA: ICD-10-CM

## 2022-08-30 DIAGNOSIS — K58.2 IRRITABLE BOWEL SYNDROME WITH BOTH CONSTIPATION AND DIARRHEA: ICD-10-CM

## 2022-08-30 DIAGNOSIS — Z12.11 COLON CANCER SCREENING: ICD-10-CM

## 2022-08-30 DIAGNOSIS — M17.12 PRIMARY OSTEOARTHRITIS OF LEFT KNEE: Primary | ICD-10-CM

## 2022-08-30 DIAGNOSIS — M25.50 ARTHRALGIA, UNSPECIFIED JOINT: ICD-10-CM

## 2022-08-30 PROBLEM — K58.9 IBS (IRRITABLE BOWEL SYNDROME): Status: ACTIVE | Noted: 2021-08-04

## 2022-08-30 PROBLEM — H26.492 PCO (POSTERIOR CAPSULAR OPACIFICATION), LEFT: Status: ACTIVE | Noted: 2017-07-06

## 2022-08-30 PROBLEM — H25.11 CATARACT, NUCLEAR SCLEROTIC, RIGHT EYE: Status: ACTIVE | Noted: 2017-04-11

## 2022-08-30 PROBLEM — H02.016 CICATRICIAL ENTROPION OF LEFT EYE: Status: ACTIVE | Noted: 2017-06-01

## 2022-08-30 PROBLEM — H35.379 EPIRETINAL MEMBRANE: Status: ACTIVE | Noted: 2017-08-20

## 2022-08-30 PROBLEM — Z98.41 CATARACT EXTRACTION STATUS OF RIGHT EYE: Status: ACTIVE | Noted: 2017-05-05

## 2022-08-30 LAB
ALBUMIN SERPL-MCNC: 4.5 G/DL (ref 3.5–5.2)
ALBUMIN/GLOB SERPL: 1.7 G/DL
ALP SERPL-CCNC: 95 U/L (ref 39–117)
ALT SERPL W P-5'-P-CCNC: 24 U/L (ref 1–41)
ANION GAP SERPL CALCULATED.3IONS-SCNC: 12.7 MMOL/L (ref 5–15)
AST SERPL-CCNC: 22 U/L (ref 1–40)
BILIRUB SERPL-MCNC: 0.3 MG/DL (ref 0–1.2)
BUN SERPL-MCNC: 26 MG/DL (ref 8–23)
BUN/CREAT SERPL: 24.5 (ref 7–25)
CALCIUM SPEC-SCNC: 10.1 MG/DL (ref 8.6–10.5)
CHLORIDE SERPL-SCNC: 102 MMOL/L (ref 98–107)
CHOLEST SERPL-MCNC: 229 MG/DL (ref 0–200)
CO2 SERPL-SCNC: 26.3 MMOL/L (ref 22–29)
CREAT SERPL-MCNC: 1.06 MG/DL (ref 0.76–1.27)
DEPRECATED RDW RBC AUTO: 40 FL (ref 37–54)
EGFRCR SERPLBLD CKD-EPI 2021: 76.4 ML/MIN/1.73
ERYTHROCYTE [DISTWIDTH] IN BLOOD BY AUTOMATED COUNT: 12 % (ref 12.3–15.4)
GLOBULIN UR ELPH-MCNC: 2.7 GM/DL
GLUCOSE SERPL-MCNC: 94 MG/DL (ref 65–99)
HCT VFR BLD AUTO: 36.6 % (ref 37.5–51)
HDLC SERPL-MCNC: 38 MG/DL (ref 40–60)
HGB BLD-MCNC: 12.5 G/DL (ref 13–17.7)
LDLC SERPL CALC-MCNC: 110 MG/DL (ref 0–100)
LDLC/HDLC SERPL: 2.56 {RATIO}
MCH RBC QN AUTO: 31.3 PG (ref 26.6–33)
MCHC RBC AUTO-ENTMCNC: 34.2 G/DL (ref 31.5–35.7)
MCV RBC AUTO: 91.7 FL (ref 79–97)
PLATELET # BLD AUTO: 312 10*3/MM3 (ref 140–450)
PMV BLD AUTO: 10 FL (ref 6–12)
POTASSIUM SERPL-SCNC: 4 MMOL/L (ref 3.5–5.2)
PROT SERPL-MCNC: 7.2 G/DL (ref 6–8.5)
PSA SERPL-MCNC: 2.39 NG/ML (ref 0–4)
RBC # BLD AUTO: 3.99 10*6/MM3 (ref 4.14–5.8)
SODIUM SERPL-SCNC: 141 MMOL/L (ref 136–145)
T4 FREE SERPL-MCNC: 0.9 NG/DL (ref 0.93–1.7)
TRIGL SERPL-MCNC: 468 MG/DL (ref 0–150)
TSH SERPL DL<=0.05 MIU/L-ACNC: 0.8 UIU/ML (ref 0.27–4.2)
VLDLC SERPL-MCNC: 81 MG/DL (ref 5–40)
WBC NRBC COR # BLD: 5.25 10*3/MM3 (ref 3.4–10.8)

## 2022-08-30 PROCEDURE — 36415 COLL VENOUS BLD VENIPUNCTURE: CPT

## 2022-08-30 PROCEDURE — 80050 GENERAL HEALTH PANEL: CPT

## 2022-08-30 PROCEDURE — 84439 ASSAY OF FREE THYROXINE: CPT

## 2022-08-30 PROCEDURE — 80061 LIPID PANEL: CPT

## 2022-08-30 PROCEDURE — G0103 PSA SCREENING: HCPCS

## 2022-08-30 PROCEDURE — 99214 OFFICE O/P EST MOD 30 MIN: CPT | Performed by: FAMILY MEDICINE

## 2022-08-30 RX ORDER — TRAMADOL HYDROCHLORIDE 50 MG/1
50 TABLET ORAL EVERY 8 HOURS PRN
Qty: 90 TABLET | Refills: 5 | Status: SHIPPED | OUTPATIENT
Start: 2022-08-30 | End: 2023-03-20

## 2022-09-01 RX ORDER — MONTELUKAST SODIUM 4 MG/1
TABLET, CHEWABLE ORAL
Qty: 60 TABLET | Refills: 11 | Status: SHIPPED | OUTPATIENT
Start: 2022-09-01

## 2022-09-12 RX ORDER — OMEPRAZOLE 40 MG/1
CAPSULE, DELAYED RELEASE ORAL
Qty: 30 CAPSULE | Refills: 11 | Status: SHIPPED | OUTPATIENT
Start: 2022-09-12

## 2022-10-12 ENCOUNTER — TELEPHONE (OUTPATIENT)
Dept: INTERNAL MEDICINE | Facility: CLINIC | Age: 67
End: 2022-10-12

## 2022-10-12 RX ORDER — NABUMETONE 750 MG/1
750 TABLET, FILM COATED ORAL 2 TIMES DAILY
Qty: 60 TABLET | Refills: 1 | Status: SHIPPED | OUTPATIENT
Start: 2022-10-12 | End: 2022-12-01

## 2022-10-12 NOTE — TELEPHONE ENCOUNTER
----- Message from Chava Santiago sent at 10/11/2022  6:46 PM EDT -----  Regarding: Knee and Back Pain  Contact: [phone]  I am writing to see if Dr. Aguero can prescribe a stronger pain reliever then Tramadol? I am now experiencing pain in my shin bone just under the left knee, 24/7 knee, back and left ankle pain.  I am taking the Tramadol 3x daily, pack my knee in ice 6 hours a day and I am still in a lot of pain. Thank you.

## 2022-10-12 NOTE — TELEPHONE ENCOUNTER
Continue tramadol and I will add something to it.  If not better needs to be seen.  Hold meloxicam while taking the new medication

## 2022-10-13 ENCOUNTER — TELEPHONE (OUTPATIENT)
Dept: FAMILY MEDICINE CLINIC | Facility: CLINIC | Age: 67
End: 2022-10-13

## 2022-10-13 NOTE — TELEPHONE ENCOUNTER
Pt did not want to see Taqueria or Dahlia, only Dr. Martinez. Appt scheduled for Oct 24th (soonest available) and put on the wait list for sooner opening.

## 2022-10-13 NOTE — TELEPHONE ENCOUNTER
Caller: Chava Santiago    Relationship to patient: Self    Best call back number: [phone]    Patient is needing: PATIENT IS HAVING A LOT OF PAIN IN HIS LEFT KNEE AND WOULD LIKE TO BE SEEN TODAY [id number]; PLEASE CALL TO ADVISE

## 2022-10-17 ENCOUNTER — TELEPHONE (OUTPATIENT)
Dept: ORTHOPEDIC SURGERY | Facility: CLINIC | Age: 67
End: 2022-10-17

## 2022-10-17 NOTE — TELEPHONE ENCOUNTER
Provider: WILSON  Caller: ABEBA MCKNIGHT  Relationship to Patient: PATIENT  Pharmacy: N/A  Phone Number: [phone]  Reason for Call: PATIENT CALLING TO SCHEDULE LT KNEE CORTISONE WITH DR BOND  When was the patient last seen: 8.10.22

## 2022-10-24 ENCOUNTER — OFFICE VISIT (OUTPATIENT)
Dept: ORTHOPEDIC SURGERY | Facility: CLINIC | Age: 67
End: 2022-10-24

## 2022-10-24 VITALS
WEIGHT: 206.4 LBS | DIASTOLIC BLOOD PRESSURE: 68 MMHG | HEIGHT: 71 IN | SYSTOLIC BLOOD PRESSURE: 101 MMHG | BODY MASS INDEX: 28.9 KG/M2

## 2022-10-24 DIAGNOSIS — M25.562 LEFT KNEE PAIN, UNSPECIFIED CHRONICITY: ICD-10-CM

## 2022-10-24 DIAGNOSIS — M17.12 PRIMARY OSTEOARTHRITIS OF LEFT KNEE: Primary | ICD-10-CM

## 2022-10-24 PROCEDURE — 20610 DRAIN/INJ JOINT/BURSA W/O US: CPT

## 2022-10-24 RX ORDER — TRIAMCINOLONE ACETONIDE 40 MG/ML
40 INJECTION, SUSPENSION INTRA-ARTICULAR; INTRAMUSCULAR
Status: COMPLETED | OUTPATIENT
Start: 2022-10-24 | End: 2022-10-24

## 2022-10-24 RX ORDER — LIDOCAINE HYDROCHLORIDE 10 MG/ML
4 INJECTION, SOLUTION EPIDURAL; INFILTRATION; INTRACAUDAL; PERINEURAL
Status: COMPLETED | OUTPATIENT
Start: 2022-10-24 | End: 2022-10-24

## 2022-10-24 RX ADMIN — LIDOCAINE HYDROCHLORIDE 4 ML: 10 INJECTION, SOLUTION EPIDURAL; INFILTRATION; INTRACAUDAL; PERINEURAL at 14:40

## 2022-10-24 RX ADMIN — TRIAMCINOLONE ACETONIDE 40 MG: 40 INJECTION, SUSPENSION INTRA-ARTICULAR; INTRAMUSCULAR at 14:40

## 2022-10-24 NOTE — PROGRESS NOTES
[facility] [place] Orthopaedic Surgery Office Follow Up       Office Follow Up Visit       Patient Name: Chava Santiago    Chief Complaint:   Chief Complaint   Patient presents with   • Follow-up     Primary osteoarthritis of left knee, last injection 4/27/22, visco denied       Referring Physician: No ref. provider found    History of Present Illness:   Chava Santiago returns to clinic today for cortisone injection in left knee. He has been seen by Dr. Bhatti in the past for injections. Last on 4/27/22. He was denied for visco gel injections. He would like another cortisone injection today. He says the last injection wore off after 2 months.      Subjective     Review of Systems   Musculoskeletal: Positive for arthralgias.   All other systems reviewed and are negative.       I have reviewed and updated the following portions of the patient's history and review of systems: allergies, current medications, past family history, past medical history, past social history, past surgical history and problem list.    Medications:   Current Outpatient Medications:   •  colestipol (COLESTID) 1 g tablet, TAKE ONE TABLET BY MOUTH TWICE A DAY AS NEEDED FOR DIARRHEA, Disp: 60 tablet, Rfl: 11  •  cyclobenzaprine (FLEXERIL) 10 MG tablet, TAKE ONE TABLET BY MOUTH EVERY NIGHT AT BEDTIME AS NEEDED FOR PAIN, Disp: 30 tablet, Rfl: 1  •  dicyclomine (BENTYL) 20 MG tablet, TAKE ONE TABLET BY MOUTH FOUR TIMES A DAY AS NEEDED FOR ABDOMINAL PAIN OR DIARRHEA, Disp: 60 tablet, Rfl: 10  •  docusate sodium (Colace) 100 MG capsule, Take 1 capsule by mouth 2 (Two) Times a Day As Needed for Constipation., Disp: 62 capsule, Rfl: 0  •  hydroCHLOROthiazide (HYDRODIURIL) 25 MG tablet, Take 1 tablet by mouth Daily., Disp: 90 tablet, Rfl: 3  •  HYDROcodone-acetaminophen (Norco) 5-325 MG per tablet, Take 1 tablet by mouth Every 6 (Six) Hours As Needed for Severe Pain ., Disp: 15 tablet, Rfl: 0  •  nabumetone  "(RELAFEN) 750 MG tablet, Take 1 tablet by mouth 2 (Two) Times a Day., Disp: 60 tablet, Rfl: 1  •  omeprazole (priLOSEC) 40 MG capsule, TAKE ONE CAPSULE BY MOUTH DAILY, Disp: 30 capsule, Rfl: 11  •  ondansetron (Zofran) 4 MG tablet, Take 1 tablet by mouth Every 8 (Eight) Hours As Needed for Nausea or Vomiting., Disp: 10 tablet, Rfl: 1  •  pravastatin (PRAVACHOL) 40 MG tablet, TAKE ONE TABLET BY MOUTH DAILY, Disp: 90 tablet, Rfl: 3  •  traMADol (ULTRAM) 50 MG tablet, Take 1 tablet by mouth Every 8 (Eight) Hours As Needed for Moderate Pain., Disp: 90 tablet, Rfl: 5  •  valsartan (DIOVAN) 160 MG tablet, TAKE ONE TABLET BY MOUTH DAILY, Disp: 30 tablet, Rfl: 11    Allergies: No Known Allergies      Objective      Vital Signs:   Vitals:    10/24/22 1437   BP: 101/68   Weight: 93.6 kg (206 lb 6.4 oz)   Height: 180.3 cm (70.98\")       Ortho Exam:  General: no acute distress, comfortable  Vitals reviewed in chart    Musculoskeletal Exam:    SIDE: LEFT KNEE    Tenderness: Nontender    Painful arc of motion: no  No evidence of septic joint. No erythema or breaks in skin.    Results Review:     No Images in the past 120 days found..      Assessment / Plan      Assessment:   Diagnoses and all orders for this visit:    1. Primary osteoarthritis of left knee (Primary)    2. Left knee pain, unspecified chronicity    Other orders  -     Large Joint Arthrocentesis: L knee      Plan:  1. I discussed with the patient the potential benefits of performing a therapeutic injection of the left knee as well as potential risks including but not limited to infection, swelling, pain, bleeding, bruising, nerve/vessel damage, skin color changes, transient elevation in blood glucose levels, and fat atrophy. After informed consent and verifying correct patient, procedure site, and type of procedure, the area was prepped with Hibiclens, ethyl chloride was used to numb the skin. Via the inferior lateral approach, 4cc of 1% lidocaine and  40mg/ml of " Kenalog were injected into the knee. The patient tolerated the procedure well. There were no complications.       Follow Up:   Return if symptoms worsen or fail to improve.        Lucia Meng PA-C  [facility] Orthopedic Surgery    Dictated using Dragon Speech Recognition.

## 2022-10-24 NOTE — PROGRESS NOTES
Procedure   Large Joint Arthrocentesis: L knee  Date/Time: 10/24/2022 2:40 PM  Consent given by: patient  Site marked: site marked  Timeout: Immediately prior to procedure a time out was called to verify the correct patient, procedure, equipment, support staff and site/side marked as required   Supporting Documentation  Indications: pain   Procedure Details  Location: knee - L knee  Needle size: 22 G  Approach: anterolateral  Medications administered: 4 mL lidocaine PF 1% 1 %; 40 mg triamcinolone acetonide 40 MG/ML  Patient tolerance: patient tolerated the procedure well with no immediate complications

## 2022-11-29 NOTE — PROGRESS NOTES
[facility] [place] Orthopaedic Surgery Clinic Note    Subjective     Chief Complaint   Patient presents with   • Left Knee - Pain        HPI      Chava HERNANDES Law is a 62 y.o. male.  Complains of left knee pain.  It started 2 months ago.  He takes meloxicam.  The pain ranges from 4-8 out of 10.  It is aching.  It is worse with walking and climbing stairs.  It is better with sitting    Past Medical History:   Diagnosis Date   • Acute bronchitis    • Acute pharyngitis    • Conjunctivitis    • Hyperlipidemia    • Hypertension    • IBS (irritable bowel syndrome)    • Lumbago    • Rhinitis       Past Surgical History:   Procedure Laterality Date   • EYE SURGERY Bilateral     multiple on both eyes   • HERNIA REPAIR Right     x2   • KNEE SURGERY Left     arthroscopy with meniscal repair- 10 years ago      Family History   Problem Relation Age of Onset   • Adopted: Yes     Social History     Social History   • Marital status:      Spouse name: N/A   • Number of children: N/A   • Years of education: N/A     Occupational History   • Not on file.     Social History Main Topics   • Smoking status: Former Smoker   • Smokeless tobacco: Never Used   • Alcohol use Yes      Comment: occ   • Drug use: No   • Sexual activity: Not on file     Other Topics Concern   • Not on file     Social History Narrative   • No narrative on file      Current Outpatient Prescriptions on File Prior to Visit   Medication Sig Dispense Refill   • clotrimazole-betamethasone (LOTRISONE) 1-0.05 % cream Apply  topically 3 (Three) Times a Day. 45 g 2   • dicyclomine (BENTYL) 20 MG tablet Take 1 tablet by mouth Every 6 (Six) Hours. 60 tablet 0   • hydrochlorothiazide (HYDRODIURIL) 25 MG tablet Take 1 tablet by mouth Daily. 30 tablet 2   • losartan (COZAAR) 100 MG tablet Take 1 tablet by mouth Daily. 30 tablet 2   • meloxicam (MOBIC) 15 MG tablet Take 1 tablet by mouth Daily. 30 tablet 2   • pravastatin (PRAVACHOL) 40 MG tablet Take 1 tablet by mouth Daily. 30 tablet 2  "  • sucralfate (CARAFATE) 1 g tablet Take 1 tablet by mouth 4 (Four) Times a Day. 120 tablet 2     No current facility-administered medications on file prior to visit.       No Known Allergies     The following portions of the patient's history were reviewed and updated as appropriate: allergies, current medications, past family history, past medical history, past social history, past surgical history and problem list.    Review of Systems   Constitutional: Negative.    HENT: Negative.    Eyes: Negative.    Respiratory: Negative.    Cardiovascular: Negative.    Gastrointestinal: Negative.    Endocrine: Negative.    Genitourinary: Negative.    Musculoskeletal: Positive for arthralgias and joint swelling.   Skin: Negative.    Allergic/Immunologic: Negative.    Neurological: Negative.    Hematological: Negative.    Psychiatric/Behavioral: Negative.         Objective      Physical Exam  Pulse 62   Ht 175.3 cm (69\")   Wt 94.9 kg (209 lb 3.5 oz)   SpO2 97%   BMI 30.90 kg/m²     Body mass index is 30.9 kg/m².        GENERAL APPEARANCE: awake, alert & oriented x 3, in no acute distress and well developed, well nourished  PSYCH: normal mood and affect  LUNGS:  breathing nonlabored, no wheezing  EYES: sclera anicteric, pupils equal  CARDIOVASCULAR: palpable pulses dorsalis pedis, palpable posterior tibial bilaterally. Capillary refill less than 2 seconds  INTEGUMENTARY: skin intact, no clubbing, cyanosis  NEUROLOGIC:  Normal Sensation and reflexes             Ortho Exam  Peripheral Vascular:    Upper Extremity:   Inspection:  Left--no cyanotic nail beds Right--no cyanotic nail beds   Bilateral:  Pink nail beds with brisk capillary refill   Palpation:  Bilateral radial pulse normal    Musculoskeletal:  Global Assessment:  Overall assessment of Lower Extremity Muscle Strength and Tone:  Left quadriceps--5/5   Left hamstrings--5/5       Left tibialis anterior--5/5  Left gastroc-soleus--5/5  Left EHL --5/5    Lower " Extremity:  Knee/Patella:  No digital clubbing or cyanosis.    Examination of left knee reveals:  Normal deep tendon reflexes, coordination, strength, tone, sensation.  No known fractures or deformities.    Inspection and Palpation:  Left knee:  Tenderness:  Over the medial joint line and moderate severity  Effusion:  none  Crepitus:  Positive  Pulses:  2+  Ecchymosis:  None  Warmth:  None     ROM:  Right:  Extension:5    Flexion:120  Left:  Extension:5     Flexion:120    Instability:    Left:  Lachman Test:  Negative, Varus stress test negative, Valgus stress test negative    Deformities/Malalignments/Discrepancies:    Left:  Genu Varum   Right:  No deformity    Functional Testing:  Jacquelyn's test:  Negative  Patella grind test:  Positive  Q-angle:  normal    Imaging/Studies  Imaging Results (last 7 days)     ** No results found for the last 168 hours. **      I reviewed his x-rays in [facility] from July 26 was show minimal arthritis.    Assessment/Plan        ICD-10-CM ICD-9-CM   1. Arthritis of left knee M17.12 716.96   2. Primary osteoarthritis of left knee M17.12 715.16       Orders Placed This Encounter   Procedures   • Large Joint Arthrocentesis    We discussed different treatment options.  I injected the left knee with cortisone.  He tolerated injection well without complication and will follow-up in 3 weeks.  If he does not get better we will consider MRI.    Medical Decision Making  Management Options : over-the-counter medicine and prescription/IM medicine  Data/Risk: radiology tests and independent visualization of imaging, lab tests, or EMG/NCV    Raúl Bhatti MD  08/08/18  9:55 AM         EMR Dragon/Transcription disclaimer:  Much of this encounter note is an electronic transcription of spoken language to printed text. Electronic transcription of spoken language may permit erroneous, or at times, nonsensical words or phrases to be inadvertently transcribed. Although I have reviewed the note for such  errors, some may still exist.       answers from family members at bedside (daughter, son, and )/4 or more times a week

## 2022-12-01 RX ORDER — CYCLOBENZAPRINE HCL 10 MG
TABLET ORAL
Qty: 30 TABLET | Refills: 1 | Status: SHIPPED | OUTPATIENT
Start: 2022-12-01

## 2022-12-01 RX ORDER — NABUMETONE 750 MG/1
TABLET, FILM COATED ORAL
Qty: 60 TABLET | Refills: 1 | Status: SHIPPED | OUTPATIENT
Start: 2022-12-01 | End: 2023-02-07

## 2022-12-12 ENCOUNTER — OFFICE VISIT (OUTPATIENT)
Dept: FAMILY MEDICINE CLINIC | Facility: CLINIC | Age: 67
End: 2022-12-12

## 2022-12-12 VITALS
WEIGHT: 215 LBS | TEMPERATURE: 96.6 F | SYSTOLIC BLOOD PRESSURE: 130 MMHG | DIASTOLIC BLOOD PRESSURE: 78 MMHG | OXYGEN SATURATION: 96 % | HEART RATE: 79 BPM | HEIGHT: 71 IN | BODY MASS INDEX: 30.1 KG/M2 | RESPIRATION RATE: 16 BRPM

## 2022-12-12 DIAGNOSIS — I10 ESSENTIAL HYPERTENSION, BENIGN: ICD-10-CM

## 2022-12-12 DIAGNOSIS — M77.41 METATARSALGIA OF RIGHT FOOT: Primary | ICD-10-CM

## 2022-12-12 PROCEDURE — 99213 OFFICE O/P EST LOW 20 MIN: CPT | Performed by: FAMILY MEDICINE

## 2022-12-12 NOTE — PROGRESS NOTES
Conrad Santiago is a 67 y.o. male    Chief Complaint    Right foot pain    History of Present Illness    The patient has a history of plantar pain of the right foot for the past 2 to 3 weeks.     The patient states that he experiences pain in his right foot whenever he bears weight with activities like walking and standing. He reports that he has not changed the shoes that he wears. The patient reports he is still working 6 days a week.  He points to his metatarsal pad as his area of discomfort.  He denies any trauma, redness or swelling of the foot.    The following portions of the patient's history were reviewed and updated as appropriate: allergies, current medications, past social history and problem list    Review of Systems   Constitutional: Negative for chills and fever.   HENT: Negative for congestion, postnasal drip, sore throat and trouble swallowing.    Respiratory: Negative for cough and shortness of breath.    Cardiovascular: Negative for chest pain, palpitations and leg swelling.   Musculoskeletal: Positive for arthralgias and myalgias.   Neurological: Negative for weakness and numbness.       Objective     Vitals:    12/12/22 0813   BP: 130/78   Pulse: 79   Resp: 16   Temp: 96.6 °F (35.9 °C)   SpO2: 96%       Physical Exam  Vitals and nursing note reviewed.   Constitutional:       Appearance: He is obese.   HENT:      Head: Normocephalic and atraumatic.   Eyes:      Conjunctiva/sclera: Conjunctivae normal.      Pupils: Pupils are equal, round, and reactive to light.   Cardiovascular:      Rate and Rhythm: Normal rate and regular rhythm.      Heart sounds: Normal heart sounds.   Pulmonary:      Effort: Pulmonary effort is normal. No respiratory distress.      Breath sounds: Normal breath sounds.   Musculoskeletal:      Right foot: Decreased range of motion. Tenderness present. No swelling, deformity or bunion.      Comments: Tender across right metatarsal pad with no palpable masses or other  abnormality   Feet:      Comments: Pain across metatarsal plate. No palpable masses. Shoes show no abnormal wear.  Neurological:      Mental Status: He is alert.   Psychiatric:         Mood and Affect: Mood normal.         Behavior: Behavior normal.         Assessment & Plan     Problems Addressed this Visit        Cardiac and Vasculature    Essential hypertension, benign   Other Visit Diagnoses     Metatarsalgia of right foot    -  Primary      Diagnoses       Codes Comments    Metatarsalgia of right foot    -  Primary ICD-10-CM: M77.41  ICD-9-CM: 726.70     Essential hypertension, benign     ICD-10-CM: I10  ICD-9-CM: 401.1         Recommend metatarsal pad inserts for shoes.  Suggest patient go to [employer] for purchase and sizing.           Transcribed from ambient dictation for R Miguel Ángel Bangura MD by Lauren Dumas.  12/12/22   09:39 EST  Patient or patient representative verbalized consent to the visit recording.  I have personally performed the services described in this document as transcribed by the above individual, and it is both accurate and complete.

## 2023-01-10 ENCOUNTER — TELEPHONE (OUTPATIENT)
Dept: ORTHOPEDIC SURGERY | Facility: CLINIC | Age: 68
End: 2023-01-10
Payer: MEDICARE

## 2023-01-10 NOTE — TELEPHONE ENCOUNTER
Caller: ABEBA MCKNIGHT    Relationship to patient: SELF    Best call back number: [phone]    Chief complaint: PATIENT REQUESTING LEFT KNEE INJECTION    Type of visit: INJECTION

## 2023-01-24 ENCOUNTER — OFFICE VISIT (OUTPATIENT)
Dept: ORTHOPEDIC SURGERY | Facility: CLINIC | Age: 68
End: 2023-01-24
Payer: MEDICARE

## 2023-01-24 VITALS
WEIGHT: 212 LBS | DIASTOLIC BLOOD PRESSURE: 90 MMHG | BODY MASS INDEX: 29.68 KG/M2 | HEIGHT: 71 IN | SYSTOLIC BLOOD PRESSURE: 156 MMHG

## 2023-01-24 DIAGNOSIS — M25.562 LEFT KNEE PAIN, UNSPECIFIED CHRONICITY: Primary | ICD-10-CM

## 2023-01-24 DIAGNOSIS — M17.12 PRIMARY OSTEOARTHRITIS OF LEFT KNEE: ICD-10-CM

## 2023-01-24 PROCEDURE — 20610 DRAIN/INJ JOINT/BURSA W/O US: CPT

## 2023-01-24 RX ORDER — LIDOCAINE HYDROCHLORIDE 10 MG/ML
4 INJECTION, SOLUTION EPIDURAL; INFILTRATION; INTRACAUDAL; PERINEURAL
Status: COMPLETED | OUTPATIENT
Start: 2023-01-24 | End: 2023-01-24

## 2023-01-24 RX ORDER — TRIAMCINOLONE ACETONIDE 40 MG/ML
40 INJECTION, SUSPENSION INTRA-ARTICULAR; INTRAMUSCULAR
Status: COMPLETED | OUTPATIENT
Start: 2023-01-24 | End: 2023-01-24

## 2023-01-24 RX ADMIN — LIDOCAINE HYDROCHLORIDE 4 ML: 10 INJECTION, SOLUTION EPIDURAL; INFILTRATION; INTRACAUDAL; PERINEURAL at 14:26

## 2023-01-24 RX ADMIN — TRIAMCINOLONE ACETONIDE 40 MG: 40 INJECTION, SUSPENSION INTRA-ARTICULAR; INTRAMUSCULAR at 14:26

## 2023-01-24 NOTE — PROGRESS NOTES
Procedure   - Large Joint Arthrocentesis: L knee on 1/24/2023 2:26 PM  Indications: pain  Details: (23g) needle, anterolateral approach  Medications: 4 mL lidocaine PF 1% 1 %; 40 mg triamcinolone acetonide 40 MG/ML  Outcome: tolerated well, no immediate complications  Procedure, treatment alternatives, risks and benefits explained, specific risks discussed. Consent was given by the patient. Immediately prior to procedure a time out was called to verify the correct patient, procedure, equipment, support staff and site/side marked as required. Patient was prepped and draped in the usual sterile fashion.

## 2023-01-24 NOTE — PROGRESS NOTES
[facility] [place] Orthopaedic Surgery Office Follow Up       Office Follow Up Visit       Patient Name: Chava Santiago    Chief Complaint:   Chief Complaint   Patient presents with   • Follow-up     3 month follow up; Primary osteoarthritis of left knee        Referring Physician: No ref. provider found    History of Present Illness:   Chava Santiago returns to clinic today for follow-up of left knee pain.  Ongoing for the last 3 years.  Rates the pain an 8/10.  Last visit 10/24/2022.  Cortisone injection at that time.  He says the injections often last 2 months and then the pain returns.  He would like to consider repeat injection today.    Reports he lives alone in a second floor apartment and would not have support to consider total knee arthroplasty.      Subjective     Review of Systems   Constitutional: Negative.    HENT: Negative.    Eyes: Negative.    Respiratory: Negative.    Cardiovascular: Negative.    Gastrointestinal: Negative.    Endocrine: Negative.    Genitourinary: Negative.    Musculoskeletal: Positive for arthralgias.   Skin: Negative.    Allergic/Immunologic: Negative.    Neurological: Negative.    Hematological: Negative.    Psychiatric/Behavioral: Negative.         I have reviewed and updated the following portions of the patient's history and review of systems: allergies, current medications, past family history, past medical history, past social history, past surgical history and problem list.    Medications:   Current Outpatient Medications:   •  colestipol (COLESTID) 1 g tablet, TAKE ONE TABLET BY MOUTH TWICE A DAY AS NEEDED FOR DIARRHEA, Disp: 60 tablet, Rfl: 11  •  cyclobenzaprine (FLEXERIL) 10 MG tablet, TAKE ONE TABLET BY MOUTH EVERY NIGHT AT BEDTIME AS NEEDED FOR PAIN, Disp: 30 tablet, Rfl: 1  •  dicyclomine (BENTYL) 20 MG tablet, TAKE ONE TABLET BY MOUTH FOUR TIMES A DAY AS NEEDED FOR ABDOMINAL PAIN OR DIARRHEA, Disp: 60 tablet, Rfl: 10  •   "docusate sodium (Colace) 100 MG capsule, Take 1 capsule by mouth 2 (Two) Times a Day As Needed for Constipation., Disp: 62 capsule, Rfl: 0  •  hydroCHLOROthiazide (HYDRODIURIL) 25 MG tablet, Take 1 tablet by mouth Daily., Disp: 90 tablet, Rfl: 3  •  nabumetone (RELAFEN) 750 MG tablet, TAKE ONE TABLET BY MOUTH TWICE A DAY, Disp: 60 tablet, Rfl: 1  •  omeprazole (priLOSEC) 40 MG capsule, TAKE ONE CAPSULE BY MOUTH DAILY, Disp: 30 capsule, Rfl: 11  •  ondansetron (Zofran) 4 MG tablet, Take 1 tablet by mouth Every 8 (Eight) Hours As Needed for Nausea or Vomiting., Disp: 10 tablet, Rfl: 1  •  pravastatin (PRAVACHOL) 40 MG tablet, TAKE ONE TABLET BY MOUTH DAILY, Disp: 90 tablet, Rfl: 3  •  traMADol (ULTRAM) 50 MG tablet, Take 1 tablet by mouth Every 8 (Eight) Hours As Needed for Moderate Pain., Disp: 90 tablet, Rfl: 5  •  valsartan (DIOVAN) 160 MG tablet, TAKE ONE TABLET BY MOUTH DAILY, Disp: 30 tablet, Rfl: 11    Allergies: No Known Allergies      Objective      Vital Signs:   Vitals:    01/24/23 1417   BP: 156/90   Weight: 96.2 kg (212 lb)   Height: 180.3 cm (70.98\")       Ortho Exam:  Knee Exam: LEFT  ----------  ALIGNMENT: neutral, no varus or valgus deformity     RANGE OF MOTION:  Normal (0-120 degrees) with no extensor lag or flexion contracture    KNEE EFFUSION: no  PAIN WITH KNEE ROM: no    STRAIGHT LEG TEST:   negative    SENSATION TO LIGHT TOUCH:  DEEP PERONEAL/SUPERFICIAL PERONEAL/SURAL/SAPHENOUS/TIBIAL:   intact    EDEMA:  no  ERYTHEMA:  no  WOUNDS/INCISIONS: no overlying skin problems.      Results Review:  XR Knee 4+ View Left  Imaging: knee x-rays 4 views    Side: LEFT     Indication for x-rays: knee pain    Comparison: 11/29/2021    Findings:   Left knee 4 views shows severe varus alignment tricompartmental   osteoarthritis of the left knee.    I personally reviewed the above x-rays.       No Images in the past 120 days found..      Assessment / Plan      Assessment:   Diagnoses and all orders for this " visit:    1. Left knee pain, unspecified chronicity (Primary)  -     XR Knee 4+ View Left    2. Primary osteoarthritis of left knee    Other orders  -     - Large Joint Arthrocentesis: L knee    Reviewed x-ray images with Dr. Murillo.  Shows severe tricompartmental arthritis.       Plan:  1. Repeat cortisone injection today into left knee.  He will keep me updated if status changes regarding total knee arthroplasty.  2. May take over-the-counter anti-inflammatories as needed for pain.    I discussed with the patient the potential benefits of performing a therapeutic injection of the left knee as well as potential risks including but not limited to infection, swelling, pain, bleeding, bruising, nerve/vessel damage, skin color changes, transient elevation in blood glucose levels, and fat atrophy. After informed consent and verifying correct patient, procedure site, and type of procedure, the area was prepped with Hibiclens, ethyl chloride was used to numb the skin. Via the inferior lateral approach, 4cc of 1% lidocaine and  40mg/ml of Kenalog were injected into the knee. The patient tolerated the procedure well. There were no complications.       Follow Up:   3 months for repeat injection      Lucia Meng PA-C  [facility] [place] Orthopedic Surgery    Dictated using Dragon Speech Recognition.

## 2023-02-07 RX ORDER — NABUMETONE 750 MG/1
TABLET, FILM COATED ORAL
Qty: 60 TABLET | Refills: 1 | Status: SHIPPED | OUTPATIENT
Start: 2023-02-07

## 2023-02-15 ENCOUNTER — TELEPHONE (OUTPATIENT)
Dept: FAMILY MEDICINE CLINIC | Facility: CLINIC | Age: 68
End: 2023-02-15
Payer: MEDICARE

## 2023-02-15 NOTE — TELEPHONE ENCOUNTER
Pt agreeable to go to pain management; but wants to know if you think they will actually be able to help him?

## 2023-02-15 NOTE — TELEPHONE ENCOUNTER
----- Message from Chava Santiago sent at 2/13/2023  8:43 PM EST -----  Regarding: Pain Medication For Left Knee  Contact: [phone]  On January 24th, 2023, I visited  Orth department for a Cortisone shot. My pain was relieved for only 2 days, not the 8 weeks that I am use to when I received this shot. X-rays were taken and I was informed the the condition of my knee had deteriorated even more over the year since last X-rays were taken.  The Tramadol HCL only lasts about 3 maybe 4 hours of pain relief. Can Dr. Bangura prescribe something stronger to help with the pain?

## 2023-02-15 NOTE — TELEPHONE ENCOUNTER
Tramadol is the strongest controlled substance that we prescribe now.  He may need to consider a referral to pain management.

## 2023-02-17 ENCOUNTER — TELEPHONE (OUTPATIENT)
Dept: FAMILY MEDICINE CLINIC | Facility: CLINIC | Age: 68
End: 2023-02-17
Payer: MEDICARE

## 2023-02-17 DIAGNOSIS — M17.12 PRIMARY OSTEOARTHRITIS OF LEFT KNEE: Primary | ICD-10-CM

## 2023-02-17 NOTE — TELEPHONE ENCOUNTER
----- Message from Chava Santiago sent at 2/16/2023  9:24 PM EST -----  Regarding: Pain Medication For Left Knee  Contact: [phone]  I agrre. If you could set something else I would appreciate it. Until I am able to get my surgery on my knee I need to follow avenues that can help me through the day and through work without much pain. Thank you again, appreciate your help/

## 2023-03-19 DIAGNOSIS — M17.12 PRIMARY OSTEOARTHRITIS OF LEFT KNEE: ICD-10-CM

## 2023-03-19 DIAGNOSIS — M25.50 ARTHRALGIA, UNSPECIFIED JOINT: ICD-10-CM

## 2023-03-20 RX ORDER — TRAMADOL HYDROCHLORIDE 50 MG/1
TABLET ORAL
Qty: 90 TABLET | Refills: 2 | Status: SHIPPED | OUTPATIENT
Start: 2023-03-20

## 2023-03-23 RX ORDER — ONDANSETRON 4 MG/1
TABLET, FILM COATED ORAL
Qty: 10 TABLET | Refills: 1 | OUTPATIENT
Start: 2023-03-23

## 2023-04-09 DIAGNOSIS — K58.2 IRRITABLE BOWEL SYNDROME WITH BOTH CONSTIPATION AND DIARRHEA: ICD-10-CM

## 2023-04-10 RX ORDER — DICYCLOMINE HCL 20 MG
TABLET ORAL
Qty: 60 TABLET | Refills: 0 | Status: SHIPPED | OUTPATIENT
Start: 2023-04-10

## 2023-04-17 RX ORDER — NABUMETONE 750 MG/1
TABLET, FILM COATED ORAL
Qty: 60 TABLET | Refills: 1 | Status: SHIPPED | OUTPATIENT
Start: 2023-04-17

## 2023-05-17 RX ORDER — MELOXICAM 15 MG/1
TABLET ORAL
Qty: 90 TABLET | Refills: 2 | OUTPATIENT
Start: 2023-05-17

## 2023-06-08 DIAGNOSIS — I10 ESSENTIAL HYPERTENSION, BENIGN: ICD-10-CM

## 2023-06-08 RX ORDER — VALSARTAN 160 MG/1
TABLET ORAL
Qty: 90 TABLET | Refills: 3 | Status: SHIPPED | OUTPATIENT
Start: 2023-06-08

## 2023-06-11 DIAGNOSIS — K58.2 IRRITABLE BOWEL SYNDROME WITH BOTH CONSTIPATION AND DIARRHEA: ICD-10-CM

## 2023-06-12 RX ORDER — DICYCLOMINE HCL 20 MG
TABLET ORAL
Qty: 60 TABLET | Refills: 0 | Status: SHIPPED | OUTPATIENT
Start: 2023-06-12

## 2023-06-19 RX ORDER — NABUMETONE 750 MG/1
TABLET, FILM COATED ORAL
Qty: 60 TABLET | Refills: 1 | Status: SHIPPED | OUTPATIENT
Start: 2023-06-19

## 2023-07-23 DIAGNOSIS — E78.2 MIXED HYPERLIPIDEMIA: ICD-10-CM

## 2023-07-23 DIAGNOSIS — I10 ESSENTIAL HYPERTENSION, BENIGN: ICD-10-CM

## 2023-07-24 RX ORDER — PRAVASTATIN SODIUM 40 MG
TABLET ORAL
Qty: 90 TABLET | Refills: 3 | Status: SHIPPED | OUTPATIENT
Start: 2023-07-24

## 2023-07-24 RX ORDER — HYDROCHLOROTHIAZIDE 25 MG/1
TABLET ORAL
Qty: 90 TABLET | Refills: 3 | Status: SHIPPED | OUTPATIENT
Start: 2023-07-24

## 2023-09-01 DIAGNOSIS — K58.2 IRRITABLE BOWEL SYNDROME WITH BOTH CONSTIPATION AND DIARRHEA: ICD-10-CM

## 2023-09-05 RX ORDER — DICYCLOMINE HCL 20 MG
TABLET ORAL
Qty: 60 TABLET | Refills: 0 | Status: SHIPPED | OUTPATIENT
Start: 2023-09-05

## 2023-09-05 RX ORDER — OMEPRAZOLE 40 MG/1
CAPSULE, DELAYED RELEASE ORAL
Qty: 30 CAPSULE | Refills: 11 | Status: SHIPPED | OUTPATIENT
Start: 2023-09-05

## 2023-09-05 RX ORDER — NABUMETONE 750 MG/1
TABLET, FILM COATED ORAL
Qty: 60 TABLET | Refills: 1 | Status: SHIPPED | OUTPATIENT
Start: 2023-09-05

## 2023-10-15 DIAGNOSIS — K58.2 IRRITABLE BOWEL SYNDROME WITH BOTH CONSTIPATION AND DIARRHEA: ICD-10-CM

## 2023-10-16 RX ORDER — DICYCLOMINE HCL 20 MG
TABLET ORAL
Qty: 60 TABLET | Refills: 5 | Status: SHIPPED | OUTPATIENT
Start: 2023-10-16

## 2023-10-18 NOTE — TELEPHONE ENCOUNTER
TALKED TO PATIENT ABOUT THE CHANGE OF ARRIVAL TIME, PATIENT NOW TO ARRIVE AT 9:30AM.   Spironolactone Counseling: Patient advised regarding risks of diarrhea, abdominal pain, hyperkalemia, birth defects (for female patients), liver toxicity and renal toxicity. The patient may need blood work to monitor liver and kidney function and potassium levels while on therapy. The patient verbalized understanding of the proper use and possible adverse effects of spironolactone.  All of the patient's questions and concerns were addressed.

## 2023-11-06 RX ORDER — NABUMETONE 750 MG/1
TABLET, FILM COATED ORAL
Qty: 60 TABLET | Refills: 1 | Status: SHIPPED | OUTPATIENT
Start: 2023-11-06

## 2023-11-29 ENCOUNTER — HOSPITAL ENCOUNTER (OUTPATIENT)
Dept: GENERAL RADIOLOGY | Facility: HOSPITAL | Age: 68
Discharge: HOME | End: 2023-11-29
Admitting: FAMILY MEDICINE
Payer: MEDICARE

## 2023-11-29 ENCOUNTER — OFFICE VISIT (OUTPATIENT)
Dept: FAMILY MEDICINE CLINIC | Facility: CLINIC | Age: 68
End: 2023-11-29
Payer: MEDICARE

## 2023-11-29 ENCOUNTER — LAB (OUTPATIENT)
Dept: LAB | Facility: HOSPITAL | Age: 68
End: 2023-11-29
Payer: MEDICARE

## 2023-11-29 VITALS
OXYGEN SATURATION: 98 % | RESPIRATION RATE: 15 BRPM | SYSTOLIC BLOOD PRESSURE: 132 MMHG | HEART RATE: 75 BPM | HEIGHT: 71 IN | TEMPERATURE: 98.4 F | WEIGHT: 212 LBS | DIASTOLIC BLOOD PRESSURE: 84 MMHG | BODY MASS INDEX: 29.68 KG/M2

## 2023-11-29 DIAGNOSIS — M79.642 PAIN IN BOTH HANDS: Primary | ICD-10-CM

## 2023-11-29 DIAGNOSIS — M79.642 PAIN IN BOTH HANDS: ICD-10-CM

## 2023-11-29 DIAGNOSIS — M79.641 PAIN IN BOTH HANDS: Primary | ICD-10-CM

## 2023-11-29 DIAGNOSIS — M79.641 PAIN IN BOTH HANDS: ICD-10-CM

## 2023-11-29 DIAGNOSIS — M65.342 TRIGGER RING FINGER OF LEFT HAND: ICD-10-CM

## 2023-11-29 PROCEDURE — 36415 COLL VENOUS BLD VENIPUNCTURE: CPT

## 2023-11-29 PROCEDURE — 73130 X-RAY EXAM OF HAND: CPT

## 2023-11-29 PROCEDURE — 3075F SYST BP GE 130 - 139MM HG: CPT | Performed by: FAMILY MEDICINE

## 2023-11-29 PROCEDURE — 3079F DIAST BP 80-89 MM HG: CPT | Performed by: FAMILY MEDICINE

## 2023-11-29 PROCEDURE — 1159F MED LIST DOCD IN RCRD: CPT | Performed by: FAMILY MEDICINE

## 2023-11-29 PROCEDURE — 99213 OFFICE O/P EST LOW 20 MIN: CPT | Performed by: FAMILY MEDICINE

## 2023-11-29 PROCEDURE — 80053 COMPREHEN METABOLIC PANEL: CPT

## 2023-11-29 PROCEDURE — 83735 ASSAY OF MAGNESIUM: CPT

## 2023-11-29 PROCEDURE — 1160F RVW MEDS BY RX/DR IN RCRD: CPT | Performed by: FAMILY MEDICINE

## 2023-11-29 NOTE — PROGRESS NOTES
Circumcision Procedure Note    Patient: Júnior Joyce SEX: male  DOA: 2021   YOB: 2023  Age: 1 days  LOS:  LOS: 1 day         Preoperative Diagnosis: Intact foreskin, Parents request circumcision of     Post Procedure Diagnosis: Circumcised male infant    Assistant: None    Findings: Normal Genitalia    Specimens Removed: Foreskin    Complications: None    Circumcision consent obtained. Dorsal Penile Nerve Block (DPNB) 0.8cc of 1% Lidocaine, Sweet Ease and Pacifier. 1.1 Gomco used. Tolerated well. Estimated Blood Loss:  Less than 1cc    Petroleum applied. Home care instructions provided by nursing.     Signed By: Rj Ramirez MD     2023 Conrad Santiago is a 68 y.o. male      Chief Complaint  Hand cramps and pains       The patient is complaining of pain and cramping in bilateral hands near the base of his second and third digits for a few weeks.    The patient reports that when he makes a fist, it hurts. When he comes out of it, it cramps badly. It is the same thing on his right hand. His ring finger does catch and snap. It happens every day.  No injury or other trauma.  No joint swelling or redness.  History of general mild to moderate osteoarthritis but no history of rheumatoid or other autoimmune issue.      The following portions of the patient's history were reviewed and updated as appropriate: allergies, current medications, past social history and problem list.    Review of Systems   Constitutional:  Negative for chills and fever.   Eyes:  Negative for pain, discharge and visual disturbance.   Respiratory:  Negative for cough and shortness of breath.    Cardiovascular:  Negative for chest pain, palpitations and leg swelling.   Genitourinary:  Negative for dysuria, flank pain and hematuria.   Musculoskeletal:  Positive for arthralgias and myalgias.   Hematological:  Negative for adenopathy. Does not bruise/bleed easily.         Objective         Vitals:    11/29/23 1341   BP: 132/84   Pulse: 75   Resp: 15   Temp: 98.4 °F (36.9 °C)   SpO2: 98%         Physical Exam  Vitals and nursing note reviewed.   Constitutional:       Appearance: Normal appearance.   HENT:      Head: Normocephalic and atraumatic.   Eyes:      General: No scleral icterus.     Conjunctiva/sclera: Conjunctivae normal.      Pupils: Pupils are equal, round, and reactive to light.   Cardiovascular:      Rate and Rhythm: Normal rate and regular rhythm.   Pulmonary:      Effort: Pulmonary effort is normal.      Breath sounds: Normal breath sounds.   Musculoskeletal:      Comments: Left hand triggering at the left fourth MCP joint. No obvious Dupuytren's contractures but  does have some mild dimpling just proximal to his MCP joints on the palms of both hands.   Neurological:      Mental Status: He is alert and oriented to person, place, and time.      Sensory: No sensory deficit.      Deep Tendon Reflexes: Reflexes normal.   Psychiatric:         Mood and Affect: Mood normal.         Behavior: Behavior normal.            No results were obtained or interpreted today.      Assessment & Plan     Problems Addressed this Visit    None  Visit Diagnoses       Pain in both hands    -  Primary    Relevant Orders    Comprehensive Metabolic Panel    Magnesium    XR Hand 3+ View Bilateral    Trigger ring finger of left hand        Relevant Orders    Comprehensive Metabolic Panel    Magnesium          Diagnoses         Codes Comments    Pain in both hands    -  Primary ICD-10-CM: M79.641, M79.642  ICD-9-CM: 729.5     Trigger ring finger of left hand     ICD-10-CM: M65.342  ICD-9-CM: 727.03             Plan    X-rays both hands today as well as lab studies.    If no clear diagnosis refer to hand surgeon for opinion.     I spent 15 minutes in patient care: Reviewing records prior to the visit, examining the patient, entering orders and documentation    Part of this note may be an electronic transcription/translation of spoken language to printed text using the Dragon Dictation System.     Transcribed from ambient dictation for RUBEN Bangura MD by Jovita John.  11/29/23   14:40 EST    Patient or patient representative verbalized consent to the visit recording.  I have personally performed the services described in this document as transcribed by the above individual, and it is both accurate and complete.

## 2023-11-30 DIAGNOSIS — M65.342 TRIGGER RING FINGER OF LEFT HAND: ICD-10-CM

## 2023-11-30 DIAGNOSIS — M79.641 PAIN IN BOTH HANDS: Primary | ICD-10-CM

## 2023-11-30 DIAGNOSIS — M79.642 PAIN IN BOTH HANDS: Primary | ICD-10-CM

## 2023-11-30 LAB
ALBUMIN SERPL-MCNC: 4.7 G/DL (ref 3.5–5.2)
ALBUMIN/GLOB SERPL: 2 G/DL
ALP SERPL-CCNC: 86 U/L (ref 39–117)
ALT SERPL W P-5'-P-CCNC: 25 U/L (ref 1–41)
ANION GAP SERPL CALCULATED.3IONS-SCNC: 11 MMOL/L (ref 5–15)
AST SERPL-CCNC: 22 U/L (ref 1–40)
BILIRUB SERPL-MCNC: 0.2 MG/DL (ref 0–1.2)
BUN SERPL-MCNC: 20 MG/DL (ref 8–23)
BUN/CREAT SERPL: 19.2 (ref 7–25)
CALCIUM SPEC-SCNC: 10.1 MG/DL (ref 8.6–10.5)
CHLORIDE SERPL-SCNC: 101 MMOL/L (ref 98–107)
CO2 SERPL-SCNC: 28 MMOL/L (ref 22–29)
CREAT SERPL-MCNC: 1.04 MG/DL (ref 0.76–1.27)
EGFRCR SERPLBLD CKD-EPI 2021: 77.2 ML/MIN/1.73
GLOBULIN UR ELPH-MCNC: 2.3 GM/DL
GLUCOSE SERPL-MCNC: 86 MG/DL (ref 65–99)
MAGNESIUM SERPL-MCNC: 2.4 MG/DL (ref 1.6–2.4)
POTASSIUM SERPL-SCNC: 4 MMOL/L (ref 3.5–5.2)
PROT SERPL-MCNC: 7 G/DL (ref 6–8.5)
SODIUM SERPL-SCNC: 140 MMOL/L (ref 136–145)

## 2023-11-30 NOTE — PROGRESS NOTES
Notify patient that lab work is all normal and x-rays revealed minimal arthritic change.  I recommend consultation with orthopedic hand specialist.  Order will be written for referral.

## 2023-12-11 ENCOUNTER — OFFICE VISIT (OUTPATIENT)
Dept: ORTHOPEDIC SURGERY | Facility: CLINIC | Age: 68
End: 2023-12-11
Payer: MEDICARE

## 2023-12-11 VITALS
DIASTOLIC BLOOD PRESSURE: 76 MMHG | BODY MASS INDEX: 29.12 KG/M2 | WEIGHT: 208 LBS | HEIGHT: 71 IN | SYSTOLIC BLOOD PRESSURE: 124 MMHG

## 2023-12-11 DIAGNOSIS — M65.342 TRIGGER RING FINGER OF LEFT HAND: Primary | ICD-10-CM

## 2023-12-11 PROCEDURE — 99214 OFFICE O/P EST MOD 30 MIN: CPT | Performed by: STUDENT IN AN ORGANIZED HEALTH CARE EDUCATION/TRAINING PROGRAM

## 2023-12-11 PROCEDURE — 20550 NJX 1 TENDON SHEATH/LIGAMENT: CPT | Performed by: STUDENT IN AN ORGANIZED HEALTH CARE EDUCATION/TRAINING PROGRAM

## 2023-12-11 PROCEDURE — 3078F DIAST BP <80 MM HG: CPT | Performed by: STUDENT IN AN ORGANIZED HEALTH CARE EDUCATION/TRAINING PROGRAM

## 2023-12-11 PROCEDURE — 1159F MED LIST DOCD IN RCRD: CPT | Performed by: STUDENT IN AN ORGANIZED HEALTH CARE EDUCATION/TRAINING PROGRAM

## 2023-12-11 PROCEDURE — 1160F RVW MEDS BY RX/DR IN RCRD: CPT | Performed by: STUDENT IN AN ORGANIZED HEALTH CARE EDUCATION/TRAINING PROGRAM

## 2023-12-11 PROCEDURE — 3074F SYST BP LT 130 MM HG: CPT | Performed by: STUDENT IN AN ORGANIZED HEALTH CARE EDUCATION/TRAINING PROGRAM

## 2023-12-11 RX ORDER — LIDOCAINE HYDROCHLORIDE 10 MG/ML
0.5 INJECTION, SOLUTION EPIDURAL; INFILTRATION; INTRACAUDAL; PERINEURAL
Status: COMPLETED | OUTPATIENT
Start: 2023-12-11 | End: 2023-12-11

## 2023-12-11 RX ORDER — TRIAMCINOLONE ACETONIDE 40 MG/ML
20 INJECTION, SUSPENSION INTRA-ARTICULAR; INTRAMUSCULAR
Status: COMPLETED | OUTPATIENT
Start: 2023-12-11 | End: 2023-12-11

## 2023-12-11 RX ADMIN — TRIAMCINOLONE ACETONIDE 20 MG: 40 INJECTION, SUSPENSION INTRA-ARTICULAR; INTRAMUSCULAR at 09:08

## 2023-12-11 RX ADMIN — LIDOCAINE HYDROCHLORIDE 0.5 ML: 10 INJECTION, SOLUTION EPIDURAL; INFILTRATION; INTRACAUDAL; PERINEURAL at 09:08

## 2023-12-11 NOTE — PROGRESS NOTES
Procedure   - Hand/Upper Extremity Injection: L ring A1 for trigger finger on 12/11/2023 9:08 AM  Indications: pain  Details: 27 G needle, volar approach  Medications: 0.5 mL lidocaine PF 1% 1 %; 20 mg triamcinolone acetonide 40 MG/ML  Outcome: tolerated well, no immediate complications  Procedure, treatment alternatives, risks and benefits explained, specific risks discussed. Consent was given by the patient. Immediately prior to procedure a time out was called to verify the correct patient, procedure, equipment, support staff and site/side marked as required. Patient was prepped and draped in the usual sterile fashion.

## 2023-12-11 NOTE — PROGRESS NOTES
Psychiatric Orthopedic     Office Visit       Date: 12/11/2023   Patient Name: Chava Santiago  MRN: [id number]  YOB: 1955    Referring Physician: Ciaran Bangura*     Chief Complaint:   Chief Complaint   Patient presents with    Right Hand - Pain    Left Hand - Pain     History of Present Illness:   Chava Santiago is a 68 y.o. male right-hand-dominant presented to clinic with complaints of left ring finger catching and locking.  He reports this has been present for approximately 2 months.  Began without injury or trauma.  He reports that the digit gets caught down and requires manual straightening.  This occurs mostly in the morning.  This is painful when this occurs, however there is only mild pain at rest.  No numbness or tingling.  He has previously had bilateral carpal tunnel releases and denies any recurrence of symptoms.  No other complaints or concerns.    Subjective   Review of Systems:   Review of Systems   Constitutional: Negative.  Negative for chills, fatigue and fever.   HENT: Negative.  Negative for congestion and dental problem.    Eyes: Negative.  Negative for blurred vision.   Respiratory: Negative.  Negative for shortness of breath.    Cardiovascular: Negative.  Negative for leg swelling.   Gastrointestinal: Negative.  Negative for abdominal pain.   Endocrine: Negative.  Negative for polyuria.   Genitourinary: Negative.  Negative for difficulty urinating.   Musculoskeletal:  Positive for arthralgias.   Skin: Negative.    Allergic/Immunologic: Negative.    Neurological: Negative.    Hematological: Negative.  Negative for adenopathy.   Psychiatric/Behavioral: Negative.  Negative for behavioral problems.         Pertinent review of systems per HPI    I reviewed the patient's chief complaint, history of present illness, review of systems, past medical history, surgical history, family history, social history,  "medications and allergy list in the EMR on 12/11/2023 and agree with the findings above.    Objective    Quality Measures:   ACP:   ACP discussion was declined by the patient.      Tobacco:   Chava S Law  reports that he quit smoking about 40 years ago. His smoking use included cigarettes. He has never used smokeless tobacco..     Vital Signs:   Vitals:    12/11/23 0838   BP: 124/76   Weight: 94.3 kg (208 lb)   Height: 180.3 cm (70.98\")     BMI:      General: No acute distress. Alert and oriented.     Ortho Exam:  Examination of bilateral upper extremity demonstrates a subtle adduction of the first metacarpal with prominence of the first CMC joints.  No skin lesions or abrasions.  Well-healed carpal tunnel incisions.  No swelling or ecchymosis.  There is tenderness at the left ring finger A1 pulley with catching and loss locking elicited during examination.  The remainder of the A1 pulleys are nontender.  Mild tenderness palpation at bilateral thumb CMC joints that is worse volarly.  Positive CMC grind on the right and negative on the left.  He is able to make a composite fist.  Sensation is grossly intact throughout the hand.  Warm and well-perfused distally.    Imaging / Studies:    Imaging Results (Last 24 Hours)       ** No results found for the last 24 hours. **        Bilateral hand x-rays obtained on 11/29/2023 were personally reviewed.  These demonstrate joint space narrowing with osteophyte formation at bilateral thumb CMC joints.  Subtle joint space narrowing noted at the STT joints bilaterally.    Procedure Note:  After reviewing the risks, benefits and alternatives to a steroid injection, which include but are not limited to; hypopigmentation, fat necrosis/atrophy, pain, swelling, bleeding, bruising, damage to nearby nerves/vessels, allergic reaction , transient elevation in blood glucose levels and infection a verbal consent was obtained. A time-out was then performed and the affected hand was prepped " with chlorhexadine soap and ethyl chloride was used to numb the skin. The left ring trigger finger was injected with 0.5cc: 0.5cc mixture of Kenalog - 40 mg/ml and Lidocaine - 1% / 2 ml. The injection was well tolerated and a sterile dressing was applied. There were no complications. I advised the patient that they might experience some local discomfort for the next couple days and can apply ice to the site as needed.       Assessment / Plan    Assessment/Plan:   Chava Santiago is a 68 y.o. male with left ring trigger finger.    I discussed with the patient their clinical findings demonstrate a trigger finger.  We had a lengthy discussion regarding the pathophysiology of inflammation of the tendon-sheath unit, using the analogy of a subway tunnel.  Both conservative and surgical options were discussed.  Conservative treatments in the form of: observation, gentle stretching exercises, nighttime coban wrapping, and injection were presented. Discussed that approximately 70% of patients have complete symptoms resolution after 1 steroid injection, and should they fail 1 injection, they may still be considered for a second steroid injection.  Also discussed that the patient may not see any improvement from the steroid injection for sometimes 2 weeks. Operative treatments in the form of A1 pulley release was also discussed.  After expressing understanding of all options, the patient elects to proceed with nighttime Coban wrapping, gentle stretching and corticosteroid injection today.  They were agreeable with the plan.  All questions and concerns were addressed.  I will see him back in 3 months for reevaluation.      ICD-10-CM ICD-9-CM   1. Trigger ring finger of left hand  M65.342 727.03     Follow Up:   Return in about 3 months (around 3/11/2024) for Follow Up.      Mary Ann Genao MD  [facility]

## 2024-01-08 RX ORDER — NABUMETONE 750 MG/1
TABLET, FILM COATED ORAL
Qty: 60 TABLET | Refills: 1 | Status: SHIPPED | OUTPATIENT
Start: 2024-01-08

## 2024-01-08 RX ORDER — SUCRALFATE ORAL 1 G/10ML
1 SUSPENSION ORAL 4 TIMES DAILY PRN
Qty: 414 ML | Refills: 11 | Status: SHIPPED | OUTPATIENT
Start: 2024-01-08

## 2024-02-12 DIAGNOSIS — M17.12 PRIMARY OSTEOARTHRITIS OF LEFT KNEE: ICD-10-CM

## 2024-02-12 DIAGNOSIS — M25.50 ARTHRALGIA, UNSPECIFIED JOINT: ICD-10-CM

## 2024-02-12 RX ORDER — TRAMADOL HYDROCHLORIDE 50 MG/1
50 TABLET ORAL EVERY 8 HOURS PRN
Qty: 90 TABLET | Refills: 5 | Status: SHIPPED | OUTPATIENT
Start: 2024-02-12

## 2024-02-15 ENCOUNTER — OFFICE VISIT (OUTPATIENT)
Dept: ORTHOPEDIC SURGERY | Facility: CLINIC | Age: 69
End: 2024-02-15
Payer: MEDICARE

## 2024-02-15 VITALS
WEIGHT: 207.2 LBS | HEIGHT: 71 IN | SYSTOLIC BLOOD PRESSURE: 142 MMHG | DIASTOLIC BLOOD PRESSURE: 90 MMHG | BODY MASS INDEX: 29.01 KG/M2

## 2024-02-15 DIAGNOSIS — M65.341 TRIGGER RING FINGER OF RIGHT HAND: ICD-10-CM

## 2024-02-15 DIAGNOSIS — M65.331 TRIGGER MIDDLE FINGER OF RIGHT HAND: Primary | ICD-10-CM

## 2024-02-15 RX ORDER — TRIAMCINOLONE ACETONIDE 40 MG/ML
20 INJECTION, SUSPENSION INTRA-ARTICULAR; INTRAMUSCULAR
Status: COMPLETED | OUTPATIENT
Start: 2024-02-15 | End: 2024-02-15

## 2024-02-15 RX ORDER — LIDOCAINE HYDROCHLORIDE 10 MG/ML
0.5 INJECTION, SOLUTION EPIDURAL; INFILTRATION; INTRACAUDAL; PERINEURAL
Status: COMPLETED | OUTPATIENT
Start: 2024-02-15 | End: 2024-02-15

## 2024-02-15 RX ADMIN — LIDOCAINE HYDROCHLORIDE 0.5 ML: 10 INJECTION, SOLUTION EPIDURAL; INFILTRATION; INTRACAUDAL; PERINEURAL at 10:48

## 2024-02-15 RX ADMIN — LIDOCAINE HYDROCHLORIDE 0.5 ML: 10 INJECTION, SOLUTION EPIDURAL; INFILTRATION; INTRACAUDAL; PERINEURAL at 10:50

## 2024-02-15 RX ADMIN — TRIAMCINOLONE ACETONIDE 20 MG: 40 INJECTION, SUSPENSION INTRA-ARTICULAR; INTRAMUSCULAR at 10:50

## 2024-02-15 RX ADMIN — TRIAMCINOLONE ACETONIDE 20 MG: 40 INJECTION, SUSPENSION INTRA-ARTICULAR; INTRAMUSCULAR at 10:48

## 2024-02-19 ENCOUNTER — PATIENT MESSAGE (OUTPATIENT)
Dept: FAMILY MEDICINE CLINIC | Facility: CLINIC | Age: 69
End: 2024-02-19
Payer: MEDICARE

## 2024-02-19 DIAGNOSIS — M79.673 PAIN OF FOOT, UNSPECIFIED LATERALITY: Primary | ICD-10-CM

## 2024-02-19 RX ORDER — COLCHICINE 0.6 MG/1
0.6 TABLET ORAL 2 TIMES DAILY
Qty: 10 TABLET | Refills: 3 | Status: SHIPPED | OUTPATIENT
Start: 2024-02-19

## 2024-02-19 RX ORDER — PREDNISONE 20 MG/1
TABLET ORAL
Qty: 9 TABLET | Refills: 0 | Status: SHIPPED | OUTPATIENT
Start: 2024-02-19

## 2024-02-19 NOTE — TELEPHONE ENCOUNTER
From: Chava Santiago  To: Ciaran Bangura  Sent: 2/19/2024 3:27 PM EST  Subject: Right Foot Big Toe    On my right foot, the middle joint on my large big toe is swollen and it is painful to walk. What do I need to do to relieve this pain and if icing the foot will reduce the swelling?

## 2024-02-19 NOTE — TELEPHONE ENCOUNTER
Sounds like gout.  I will send in some medicine but I need to check blood work.  Go to the lab tomorrow.

## 2024-02-20 ENCOUNTER — LAB (OUTPATIENT)
Dept: LAB | Facility: HOSPITAL | Age: 69
End: 2024-02-20
Payer: MEDICARE

## 2024-02-20 DIAGNOSIS — M79.673 PAIN OF FOOT, UNSPECIFIED LATERALITY: ICD-10-CM

## 2024-02-20 LAB
ANION GAP SERPL CALCULATED.3IONS-SCNC: 13.5 MMOL/L (ref 5–15)
BUN SERPL-MCNC: 25 MG/DL (ref 8–23)
BUN/CREAT SERPL: 23.1 (ref 7–25)
CALCIUM SPEC-SCNC: 10.3 MG/DL (ref 8.6–10.5)
CHLORIDE SERPL-SCNC: 102 MMOL/L (ref 98–107)
CO2 SERPL-SCNC: 25.5 MMOL/L (ref 22–29)
CREAT SERPL-MCNC: 1.08 MG/DL (ref 0.76–1.27)
EGFRCR SERPLBLD CKD-EPI 2021: 73.8 ML/MIN/1.73
GLUCOSE SERPL-MCNC: 123 MG/DL (ref 65–99)
POTASSIUM SERPL-SCNC: 4 MMOL/L (ref 3.5–5.2)
SODIUM SERPL-SCNC: 141 MMOL/L (ref 136–145)
URATE SERPL-MCNC: 9 MG/DL (ref 3.4–7)

## 2024-02-20 PROCEDURE — 80048 BASIC METABOLIC PNL TOTAL CA: CPT

## 2024-02-20 PROCEDURE — 85025 COMPLETE CBC W/AUTO DIFF WBC: CPT

## 2024-02-20 PROCEDURE — 36415 COLL VENOUS BLD VENIPUNCTURE: CPT

## 2024-02-20 PROCEDURE — 84550 ASSAY OF BLOOD/URIC ACID: CPT

## 2024-02-21 LAB
BASOPHILS # BLD AUTO: 0.02 10*3/MM3 (ref 0–0.2)
BASOPHILS NFR BLD AUTO: 0.3 % (ref 0–1.5)
DEPRECATED RDW RBC AUTO: 41.4 FL (ref 37–54)
EOSINOPHIL # BLD AUTO: 0.01 10*3/MM3 (ref 0–0.4)
EOSINOPHIL NFR BLD AUTO: 0.1 % (ref 0.3–6.2)
ERYTHROCYTE [DISTWIDTH] IN BLOOD BY AUTOMATED COUNT: 12.9 % (ref 12.3–15.4)
HCT VFR BLD AUTO: 42.1 % (ref 37.5–51)
HGB BLD-MCNC: 14.4 G/DL (ref 13–17.7)
IMM GRANULOCYTES # BLD AUTO: 0.02 10*3/MM3 (ref 0–0.05)
IMM GRANULOCYTES NFR BLD AUTO: 0.3 % (ref 0–0.5)
LYMPHOCYTES # BLD AUTO: 0.79 10*3/MM3 (ref 0.7–3.1)
LYMPHOCYTES NFR BLD AUTO: 10.7 % (ref 19.6–45.3)
MCH RBC QN AUTO: 30.1 PG (ref 26.6–33)
MCHC RBC AUTO-ENTMCNC: 34.2 G/DL (ref 31.5–35.7)
MCV RBC AUTO: 87.9 FL (ref 79–97)
MONOCYTES # BLD AUTO: 0.19 10*3/MM3 (ref 0.1–0.9)
MONOCYTES NFR BLD AUTO: 2.6 % (ref 5–12)
NEUTROPHILS NFR BLD AUTO: 6.34 10*3/MM3 (ref 1.7–7)
NEUTROPHILS NFR BLD AUTO: 86 % (ref 42.7–76)
NRBC BLD AUTO-RTO: 0 /100 WBC (ref 0–0.2)
PLATELET # BLD AUTO: 309 10*3/MM3 (ref 140–450)
PMV BLD AUTO: 10.1 FL (ref 6–12)
RBC # BLD AUTO: 4.79 10*6/MM3 (ref 4.14–5.8)
WBC NRBC COR # BLD AUTO: 7.37 10*3/MM3 (ref 3.4–10.8)

## 2024-02-21 NOTE — PROGRESS NOTES
Notify patient that it appears to be gout causing his problem.  The medication should be helping.  Follow-up appointment next week

## 2024-03-05 RX ORDER — MONTELUKAST SODIUM 4 MG/1
TABLET, CHEWABLE ORAL
Qty: 180 TABLET | Refills: 1 | Status: SHIPPED | OUTPATIENT
Start: 2024-03-05

## 2024-03-07 ENCOUNTER — OFFICE VISIT (OUTPATIENT)
Dept: FAMILY MEDICINE CLINIC | Facility: CLINIC | Age: 69
End: 2024-03-07
Payer: MEDICARE

## 2024-03-07 VITALS
DIASTOLIC BLOOD PRESSURE: 80 MMHG | OXYGEN SATURATION: 93 % | SYSTOLIC BLOOD PRESSURE: 142 MMHG | WEIGHT: 215 LBS | BODY MASS INDEX: 30.1 KG/M2 | TEMPERATURE: 96.5 F | HEART RATE: 95 BPM | RESPIRATION RATE: 16 BRPM | HEIGHT: 71 IN

## 2024-03-07 DIAGNOSIS — M10.071 ACUTE IDIOPATHIC GOUT OF RIGHT FOOT: Primary | ICD-10-CM

## 2024-03-07 DIAGNOSIS — M17.12 PRIMARY OSTEOARTHRITIS OF LEFT KNEE: ICD-10-CM

## 2024-03-07 PROCEDURE — 3079F DIAST BP 80-89 MM HG: CPT | Performed by: FAMILY MEDICINE

## 2024-03-07 PROCEDURE — 1159F MED LIST DOCD IN RCRD: CPT | Performed by: FAMILY MEDICINE

## 2024-03-07 PROCEDURE — 1160F RVW MEDS BY RX/DR IN RCRD: CPT | Performed by: FAMILY MEDICINE

## 2024-03-07 PROCEDURE — 3077F SYST BP >= 140 MM HG: CPT | Performed by: FAMILY MEDICINE

## 2024-03-07 PROCEDURE — 99213 OFFICE O/P EST LOW 20 MIN: CPT | Performed by: FAMILY MEDICINE

## 2024-03-07 RX ORDER — ALLOPURINOL 300 MG/1
300 TABLET ORAL DAILY
Qty: 30 TABLET | Refills: 5 | Status: SHIPPED | OUTPATIENT
Start: 2024-03-07

## 2024-03-07 NOTE — PROGRESS NOTES
Conrad Santiago is a 68 y.o. male      Chief Complaint  Gout  Left knee pain worsening         The patient is a 68-year-old male who presents with a chief complaint of gout. The patient called the office a week or so ago with severe pain in his right foot. He sent a picture of a markedly inflamed right great toe with marked erythema of the MTP joint, also swelling noted. X-rays were done. Labs were done. Some degenerative arthritis seen on x-ray. His uric acid was 9.0. Liver and kidney functions were okay. His CBC was normal. We treated him for gout and he is here for follow-up. He reports that his pain has resolved.    His gout has resolved. He is on his feet all the time at his job.    Has documented osteoarthritis of the knees.  His left is much worse.  He has read about partial knee replacement using robotic equipment.  He says that Dr. Woodruff in the orthopedic group in our building does the surgery.  He would like a referral.    The following portions of the patient's history were reviewed and updated as appropriate: allergies, current medications, past social history and problem list.    Review of Systems   Constitutional:  Positive for fatigue. Negative for chills and fever.   Eyes:  Negative for pain and visual disturbance.   Respiratory:  Negative for cough, choking and shortness of breath.    Cardiovascular:  Negative for chest pain and palpitations.   Genitourinary:  Negative for dysuria and hematuria.   Musculoskeletal:  Positive for arthralgias, gait problem, gout and myalgias.   Skin: Negative.    Neurological:  Negative for speech difficulty and headaches.   Hematological:  Negative for adenopathy. Does not bruise/bleed easily.         Objective         Vitals:    03/07/24 0914   BP: 142/80   Pulse: 95   Resp: 16   Temp: 96.5 °F (35.8 °C)   SpO2: 93%         Physical Exam  Vitals and nursing note reviewed.   Constitutional:       Appearance: Normal appearance.   HENT:      Head:  Normocephalic and atraumatic.   Cardiovascular:      Rate and Rhythm: Normal rate and regular rhythm.   Pulmonary:      Effort: Pulmonary effort is normal.      Breath sounds: Normal breath sounds.   Abdominal:      Palpations: Abdomen is soft.      Tenderness: There is no abdominal tenderness.   Musculoskeletal:      Cervical back: Neck supple.      Right knee: No crepitus. Decreased range of motion. Tenderness present.      Left knee: Bony tenderness and crepitus present. Decreased range of motion. Tenderness present.      Right foot: Normal.      Left foot: Normal.      Comments: Left knee positive for swelling. Positive for decreased range of motion. Negative for redness. Positive bony tenderness.   Lymphadenopathy:      Cervical: No cervical adenopathy.   Neurological:      Mental Status: He is alert.      Comments: Normal              No results were obtained or interpreted today.      Assessment & Plan     Problems Addressed this Visit    None  Visit Diagnoses       Acute idiopathic gout of right foot    -  Primary    Primary osteoarthritis of left knee              Diagnoses         Codes Comments    Acute idiopathic gout of right foot    -  Primary ICD-10-CM: M10.071  ICD-9-CM: 274.01     Primary osteoarthritis of left knee     ICD-10-CM: M17.12  ICD-9-CM: 715.16             Plan    Start allopurinol 300 mg daily to prevent future gout flareup.    Referral to Dr. Woodruff regarding left knee    I spent 25 minutes in patient care: Reviewing records prior to the visit, examining the patient, entering orders and documentation    Part of this note may be an electronic transcription/translation of spoken language to printed text using the Dragon Dictation System.     Transcribed from ambient dictation for RUBEN Bangura MD by Amada Roche.  03/07/24   11:23 EST    Patient or patient representative verbalized consent to the visit recording.  I have personally performed the services described in  this document as transcribed by the above individual, and it is both accurate and complete.

## 2024-03-08 DIAGNOSIS — M17.12 PRIMARY OSTEOARTHRITIS OF LEFT KNEE: Primary | ICD-10-CM

## 2024-03-11 RX ORDER — NABUMETONE 750 MG/1
TABLET, FILM COATED ORAL
Qty: 60 TABLET | Refills: 1 | Status: SHIPPED | OUTPATIENT
Start: 2024-03-11

## 2024-04-04 ENCOUNTER — OFFICE VISIT (OUTPATIENT)
Dept: ORTHOPEDIC SURGERY | Facility: CLINIC | Age: 69
End: 2024-04-04
Payer: MEDICARE

## 2024-04-04 VITALS
SYSTOLIC BLOOD PRESSURE: 168 MMHG | HEIGHT: 71 IN | WEIGHT: 215 LBS | BODY MASS INDEX: 30.1 KG/M2 | DIASTOLIC BLOOD PRESSURE: 100 MMHG

## 2024-04-04 DIAGNOSIS — M25.562 LEFT KNEE PAIN, UNSPECIFIED CHRONICITY: ICD-10-CM

## 2024-04-04 DIAGNOSIS — M17.12 PRIMARY OSTEOARTHRITIS OF LEFT KNEE: Primary | ICD-10-CM

## 2024-04-04 PROBLEM — M17.10 ARTHRITIS OF KNEE: Status: ACTIVE | Noted: 2024-04-04

## 2024-04-04 PROCEDURE — 1160F RVW MEDS BY RX/DR IN RCRD: CPT | Performed by: ORTHOPAEDIC SURGERY

## 2024-04-04 PROCEDURE — 3080F DIAST BP >= 90 MM HG: CPT | Performed by: ORTHOPAEDIC SURGERY

## 2024-04-04 PROCEDURE — 1159F MED LIST DOCD IN RCRD: CPT | Performed by: ORTHOPAEDIC SURGERY

## 2024-04-04 PROCEDURE — 99214 OFFICE O/P EST MOD 30 MIN: CPT | Performed by: ORTHOPAEDIC SURGERY

## 2024-04-04 PROCEDURE — 3077F SYST BP >= 140 MM HG: CPT | Performed by: ORTHOPAEDIC SURGERY

## 2024-04-04 RX ORDER — MELOXICAM 7.5 MG/1
15 TABLET ORAL ONCE
OUTPATIENT
Start: 2024-04-04 | End: 2024-04-04

## 2024-04-04 RX ORDER — ACETAMINOPHEN 325 MG/1
1000 TABLET ORAL ONCE
OUTPATIENT
Start: 2024-04-04 | End: 2024-04-04

## 2024-04-04 RX ORDER — CHLORHEXIDINE GLUCONATE 4 G/100ML
SOLUTION TOPICAL DAILY PRN
Qty: 236 ML | Refills: 0 | Status: SHIPPED | OUTPATIENT
Start: 2024-04-04

## 2024-04-04 RX ORDER — PREGABALIN 75 MG/1
75 CAPSULE ORAL ONCE
OUTPATIENT
Start: 2024-04-04 | End: 2024-04-04

## 2024-04-04 NOTE — PROGRESS NOTES
Orthopaedic Clinic Note: Knee Established Patient    Chief Complaint   Patient presents with    Follow-up     1 year f/u -- Primary osteoarthritis of left knee.        HPI    It has been 1  year(s) since Mr. Santiago's last visit for his knee.  He returns to clinic today complaining of severe left knee pain that he rates a 8 out of 10 on the pain scale.  He has previously undergone treatment with cortisone injections and anti-inflammatories.  Despite these interventions he continues to have pain that is severely limiting daily activities.  He is ambulating with the assistance of a cane.  Denies fevers chills or constitutional symptoms.  He is here today to discuss treatment for his ongoing and worsening knee pain.    Past Medical History:   Diagnosis Date    Acute bronchitis     Acute pharyngitis     Conjunctivitis     CTS (carpal tunnel syndrome)     Hyperlipidemia     Hypertension     IBS (irritable bowel syndrome)     Knee swelling     Lumbago     Rhinitis     Tear of meniscus of knee       Past Surgical History:   Procedure Laterality Date    COLONOSCOPY      EYE SURGERY Bilateral     multiple on both eyes    HAND SURGERY  3/11/2022    HERNIA REPAIR Right     x2    KNEE SURGERY Left     arthroscopy with meniscal repair- 10 years ago    VASECTOMY      WRIST SURGERY Left     CTR 3/11/22      Family History   Adopted: Yes     Social History     Socioeconomic History    Marital status:    Tobacco Use    Smoking status: Former     Current packs/day: 0.00     Types: Cigarettes     Quit date: 1983     Years since quittin.2    Smokeless tobacco: Never   Vaping Use    Vaping status: Never Used   Substance and Sexual Activity    Alcohol use: Not Currently     Comment: occ    Drug use: No    Sexual activity: Not Currently     Partners: Female      Current Outpatient Medications on File Prior to Visit   Medication Sig Dispense Refill    colestipol (COLESTID) 1 g tablet TAKE ONE TABLET BY MOUTH TWICE A DAY AS  "NEEDED FOR DIARRHEA 180 tablet 1    dicyclomine (BENTYL) 20 MG tablet TAKE 1 TABLET BY MOUTH 4 TIMES A DAY AS NEEDED FOR ABDOMINAL PAIN / DIARRHEA 60 tablet 5    docusate sodium (Colace) 100 MG capsule Take 1 capsule by mouth 2 (Two) Times a Day As Needed for Constipation. 62 capsule 0    hydroCHLOROthiazide (HYDRODIURIL) 25 MG tablet TAKE ONE TABLET BY MOUTH DAILY 90 tablet 3    nabumetone (RELAFEN) 750 MG tablet TAKE 1 TABLET BY MOUTH TWICE A DAY 60 tablet 1    omeprazole (priLOSEC) 40 MG capsule TAKE ONE CAPSULE BY MOUTH DAILY 30 capsule 11    ondansetron (Zofran) 4 MG tablet Take 1 tablet by mouth Every 8 (Eight) Hours As Needed for Nausea or Vomiting. 10 tablet 1    pravastatin (PRAVACHOL) 40 MG tablet TAKE ONE TABLET BY MOUTH DAILY 90 tablet 3    prednisoLONE acetate (PRED FORTE) 1 % ophthalmic suspension       sucralfate (Carafate) 1 GM/10ML suspension Take 10 mL by mouth 4 (Four) Times a Day As Needed (ulcer pain). 414 mL 11    traMADol (ULTRAM) 50 MG tablet Take 1 tablet by mouth Every 8 (Eight) Hours As Needed for Moderate Pain. 90 tablet 5    valsartan (DIOVAN) 160 MG tablet TAKE ONE TABLET BY MOUTH DAILY 90 tablet 3    [DISCONTINUED] allopurinol (Zyloprim) 300 MG tablet Take 1 tablet by mouth Daily. 30 tablet 5     No current facility-administered medications on file prior to visit.      No Known Allergies     Review of Systems   Constitutional: Negative.    HENT: Negative.     Eyes: Negative.    Respiratory: Negative.     Cardiovascular: Negative.    Gastrointestinal: Negative.    Endocrine: Negative.    Genitourinary: Negative.    Musculoskeletal:  Positive for arthralgias.   Skin: Negative.    Allergic/Immunologic: Negative.    Neurological: Negative.    Hematological: Negative.    Psychiatric/Behavioral: Negative.          The patient's Review of Systems was personally reviewed and confirmed as accurate.    Physical Exam  Blood pressure 168/100, height 180.3 cm (70.98\"), weight 97.5 kg (215 " lb).    Body mass index is 30 kg/m².    GENERAL APPEARANCE: awake, alert, oriented, in no acute distress and well developed, well nourished  LUNGS:  breathing nonlabored  EXTREMITIES: no clubbing, cyanosis  PERIPHERAL PULSES: palpable dorsalis pedis and posterior tibial pulses bilaterally.    GAIT:  Antalgic        ----------  Left Knee Exam:  ----------  ALIGNMENT: severe varus, correctable to neutral  ----------  RANGE OF MOTION:  Decreased (10 - 100 degrees) with no extensor lag  LIGAMENTOUS STABILITY:   stable to varus and valgus stress at terminal extension and 30 degrees; retensioning of the MCL is appreciated with valgus stress at 30 degrees consistent with medial compartment degeneration  ----------  STRENGTH:  KNEE FLEXION 4/5  KNEE EXTENSION  4/5  ANKLE DORSIFLEXION  5/5  ANKLE PLANTARFLEXION  5/5  ----------  PAIN WITH PALPATION:global  KNEE EFFUSION: yes, mild effusion  PAIN WITH KNEE ROM: yes  PATELLAR CREPITUS:  yes, painful and symptomatic  ----------  SENSATION TO LIGHT TOUCH:  DEEP PERONEAL/SUPERFICIAL PERONEAL/SURAL/SAPHENOUS/TIBIAL:    intact  ----------  EDEMA:  no  ERYTHEMA:    no  WOUNDS/INCISIONS:   no  _____________________________________________________________________  _____________________________________________________________________    RADIOGRAPHIC FINDINGS:   Indication: Left knee pain    Comparison: Todays xrays were compared to previous xrays from 1/24/2023    Knee films: moderate to severe tricompartmental arthritis with genu varum alignment and subchondral collapse of the medial femoral condyle, periarticular osteophytes visualized in all compartments and Radiographs demonstrate worsening deformity with advancing arthritic changes and wear compared to prior radiographs.    Assessment/Plan:   Diagnosis Plan   1. Primary osteoarthritis of left knee  Case Request    CBC and Differential    Comprehensive metabolic panel    Protime-INR    APTT    Hemoglobin A1c    ECG 12 Lead     Nicotine & Metabolite, Quant    Tranexamic Acid 1,000 mg in sodium chloride 0.9 % 100 mL    Tranexamic Acid 1,000 mg in sodium chloride 0.9 % 100 mL    ethyl alcohol 62 % 2 each    ceFAZolin (ANCEF) 2 g in sodium chloride 0.9 % 100 mL IVPB    acetaminophen (TYLENOL) tablet 975 mg    meloxicam (MOBIC) tablet 15 mg    pregabalin (LYRICA) capsule 75 mg    Case Request    CT Lower Extremity Left Without Contrast      2. Left knee pain, unspecified chronicity  XR Knee 4+ View Left        The patient has clinical and radiographic evidence of end-stage left knee joint degeneration. Conservative measures have been tried for 3 months or longer, but have failed to adequately treat or improve the patient's symptoms. Pain is restricting the patient's daily activities as well as quality of life. The recommendation at this time is to proceed with a left total knee arthroplasty with the goal to improve patient function and pain. The risks, benefits, potential complications, and alternatives were discussed with the patient in detail. Risks included but were not limited to bleeding, infection, anesthesia risks, damage to neurovascular structures, osteolysis, aseptic loosening, instability, dislocation, pain, continued pain, iatrogenic fracture, possible need for future surgery including the potential for amputation, blood clots, myocardial infarction, stroke, and death. Ariana-operative blood management and the potential for blood transfusion were discussed with risks and options clearly outlined. Specific details of the surgical procedure, hospitalization, recovery, rehabilitation, and long-term precautions were also presented. Pre-operative teaching was provided. Implant/prosthesis selection was outlined, and the many options available were explained; the final choice will be made at the time of the procedure to match the anatomy and condition of the bone, ligaments, tendons, and muscles. Given this instruction, the patient elected  to proceed with the left total knee arthroplasty. The patient will be seen by pre-admission testing for pre-operative optimization and risk assessment and will be scheduled for surgery once this is completed.    The patient is considered standard risk for DVT based on patient risk factors and will be placed on aspirin postoperatively for DVT prophylaxis.        Rolando Woodruff MD  04/04/24  15:03 EDT  Answers submitted by the patient for this visit:  Primary Reason for Visit (Submitted on 3/30/2024)  What is the primary reason for your visit?: Other  Other (Submitted on 3/30/2024)  Please describe your symptoms.: Left Knee pain  Have you had these symptoms before?: Yes  How long have you been having these symptoms?: Greater than 2 weeks  Please list any medications you are currently taking for this condition.: Tramadol 50 mg, Nabumetone 750 mg, Naproxen, Ibuprofen  Please describe any probable cause for these symptoms. : Degenerative Arthritis

## 2024-04-24 ENCOUNTER — TELEPHONE (OUTPATIENT)
Dept: ORTHOPEDIC SURGERY | Facility: CLINIC | Age: 69
End: 2024-04-24
Payer: MEDICARE

## 2024-04-24 NOTE — TELEPHONE ENCOUNTER
PATIENT RETURNED MISSED CALL FROM MARZENA RE: PATIENT'S FMLA PAPERWORK BEING READY FOR PICKUP (SEE ALREADY SIGNED TEL ENC FROM TODAY 04-24-24 @ 0039)    PATIENT REQUESTS FMLA PAPERWORK BE FAXED TO HIS EMPLOYER: [address] [place] [address] DEPARTMENT, ATTN: SARA VOSS AT [phone]     PATIENT STATED HE PLANS TO  PAPER COPY FROM [facility] TOMORROW 04-25-24     THANKS

## 2024-04-24 NOTE — TELEPHONE ENCOUNTER
**HUB OKAT TO RELAY**  Called patient to notify FMLA paperwork is ready for . Left vm  Placed paperwork in accordion file

## 2024-04-24 NOTE — TELEPHONE ENCOUNTER
Faxed [facility] paperwork to [employer] as requested     ATTN: SARA VOSS AT [phone]

## 2024-05-01 ENCOUNTER — PRE-ADMISSION TESTING (OUTPATIENT)
Dept: PREADMISSION TESTING | Facility: HOSPITAL | Age: 69
End: 2024-05-01
Payer: MEDICARE

## 2024-05-01 VITALS — WEIGHT: 216.05 LBS | HEIGHT: 71 IN | BODY MASS INDEX: 30.25 KG/M2

## 2024-05-01 DIAGNOSIS — M17.12 PRIMARY OSTEOARTHRITIS OF LEFT KNEE: ICD-10-CM

## 2024-05-01 LAB
ALBUMIN SERPL-MCNC: 4.3 G/DL (ref 3.5–5.2)
ALBUMIN/GLOB SERPL: 1.4 G/DL
ALP SERPL-CCNC: 87 U/L (ref 39–117)
ALT SERPL W P-5'-P-CCNC: 19 U/L (ref 1–41)
ANION GAP SERPL CALCULATED.3IONS-SCNC: 13 MMOL/L (ref 5–15)
APTT PPP: 28.4 SECONDS (ref 22–39)
AST SERPL-CCNC: 18 U/L (ref 1–40)
BASOPHILS # BLD AUTO: 0.08 10*3/MM3 (ref 0–0.2)
BASOPHILS NFR BLD AUTO: 1.4 % (ref 0–1.5)
BILIRUB SERPL-MCNC: 0.2 MG/DL (ref 0–1.2)
BUN SERPL-MCNC: 19 MG/DL (ref 8–23)
BUN/CREAT SERPL: 17.3 (ref 7–25)
CALCIUM SPEC-SCNC: 9.6 MG/DL (ref 8.6–10.5)
CHLORIDE SERPL-SCNC: 103 MMOL/L (ref 98–107)
CO2 SERPL-SCNC: 26 MMOL/L (ref 22–29)
CREAT SERPL-MCNC: 1.1 MG/DL (ref 0.76–1.27)
DEPRECATED RDW RBC AUTO: 44.3 FL (ref 37–54)
EGFRCR SERPLBLD CKD-EPI 2021: 72.2 ML/MIN/1.73
EOSINOPHIL # BLD AUTO: 0.26 10*3/MM3 (ref 0–0.4)
EOSINOPHIL NFR BLD AUTO: 4.5 % (ref 0.3–6.2)
ERYTHROCYTE [DISTWIDTH] IN BLOOD BY AUTOMATED COUNT: 13.2 % (ref 12.3–15.4)
GLOBULIN UR ELPH-MCNC: 3 GM/DL
GLUCOSE SERPL-MCNC: 93 MG/DL (ref 65–99)
HBA1C MFR BLD: 5.5 % (ref 4.8–5.6)
HCT VFR BLD AUTO: 41.4 % (ref 37.5–51)
HGB BLD-MCNC: 13.8 G/DL (ref 13–17.7)
IMM GRANULOCYTES # BLD AUTO: 0.01 10*3/MM3 (ref 0–0.05)
IMM GRANULOCYTES NFR BLD AUTO: 0.2 % (ref 0–0.5)
INR PPP: 0.89 (ref 0.89–1.12)
LYMPHOCYTES # BLD AUTO: 1.92 10*3/MM3 (ref 0.7–3.1)
LYMPHOCYTES NFR BLD AUTO: 33.3 % (ref 19.6–45.3)
MCH RBC QN AUTO: 30.5 PG (ref 26.6–33)
MCHC RBC AUTO-ENTMCNC: 33.3 G/DL (ref 31.5–35.7)
MCV RBC AUTO: 91.6 FL (ref 79–97)
MONOCYTES # BLD AUTO: 0.62 10*3/MM3 (ref 0.1–0.9)
MONOCYTES NFR BLD AUTO: 10.7 % (ref 5–12)
NEUTROPHILS NFR BLD AUTO: 2.88 10*3/MM3 (ref 1.7–7)
NEUTROPHILS NFR BLD AUTO: 49.9 % (ref 42.7–76)
NRBC BLD AUTO-RTO: 0 /100 WBC (ref 0–0.2)
PLATELET # BLD AUTO: 278 10*3/MM3 (ref 140–450)
PMV BLD AUTO: 9.9 FL (ref 6–12)
POTASSIUM SERPL-SCNC: 3.4 MMOL/L (ref 3.5–5.2)
PROT SERPL-MCNC: 7.3 G/DL (ref 6–8.5)
PROTHROMBIN TIME: 12.1 SECONDS (ref 12.2–14.5)
QT INTERVAL: 416 MS
QTC INTERVAL: 422 MS
RBC # BLD AUTO: 4.52 10*6/MM3 (ref 4.14–5.8)
SODIUM SERPL-SCNC: 142 MMOL/L (ref 136–145)
WBC NRBC COR # BLD AUTO: 5.77 10*3/MM3 (ref 3.4–10.8)

## 2024-05-01 PROCEDURE — 85025 COMPLETE CBC W/AUTO DIFF WBC: CPT

## 2024-05-01 PROCEDURE — 83036 HEMOGLOBIN GLYCOSYLATED A1C: CPT

## 2024-05-01 PROCEDURE — 85610 PROTHROMBIN TIME: CPT

## 2024-05-01 PROCEDURE — 93010 ELECTROCARDIOGRAM REPORT: CPT | Performed by: INTERNAL MEDICINE

## 2024-05-01 PROCEDURE — 85730 THROMBOPLASTIN TIME PARTIAL: CPT

## 2024-05-01 PROCEDURE — G0480 DRUG TEST DEF 1-7 CLASSES: HCPCS

## 2024-05-01 PROCEDURE — 36415 COLL VENOUS BLD VENIPUNCTURE: CPT

## 2024-05-01 PROCEDURE — 93005 ELECTROCARDIOGRAM TRACING: CPT

## 2024-05-01 PROCEDURE — 80053 COMPREHEN METABOLIC PANEL: CPT

## 2024-05-01 NOTE — PAT
An arrival time for procedure was not provided during PAT visit. If patient had any questions or concerns about their arrival time, they were instructed to contact their surgeon/physician.  Additionally, if the patient referred to an arrival time that was acquired from their my chart account, patient was encouraged to verify that time with their surgeon/physician. Arrival times are NOT provided in Pre Admission Testing Department.    Per Anesthesia Request, patient instructed not to take their ACE/ARB medications on the AM of surgery.    Patient instructed to drink 20 ounces of Gatorade or Gatorlyte (if diabetic) and it needs to be completed 1 hour (for Main OR patients) or 2 hours (scheduled  section & [facility] patients) before given arrival time for procedure (NO RED Gatorade and NO Gatorade Zero).    Patient verbalized understanding.    Discussed with patient options for receiving total joint replacement education and assessed patient's ability and preference. Joint Replacement Guide given to patient during PAT visit since not received a copy within the last year. Encouraged patient/family to read guide thoroughly and notify PAT staff with any questions or concerns. Handout provided directing patient to links to watch online videos related to joint replacement surgery on the [facility] website. The handout gives detailed instructions for joining an online joint replacement class through Zoom or phone conference offered on . Patient agreed to participate by watching videos online. Patient verbalized understanding of instructions and to complete the online learning tool survey. Encouraged to share information with family and/or . An overview of the joint replacement education was provided during the visit including general perioperative instructions that are routine for all surgical patients (PAT PASS, wipes, directions to pre-op, etc.).    Post-Surgery Information Instruction Sheet given to  patient during Pre-Admission Testing Visit with verbal instructions to patient to return with PAT PASS on the day of surgery. Additionally, encouraged patient to review the information provided.    In[employer] (by [employer]) pain pump patient informational handout given to patient.  Instructed patient to watch [employer] Patient Education Video regarding Peripheral Nerve Catheter that will be in place for upcoming surgery unless contraindicated. The video can be accessed using QR code noted on handout.  Patient agreed to watch video.  Stressed to patient to call [facility] Nursing Hotline 24/7 if patient has any questions or concerns after discharge.     Prescription for Chlorhexidine shower called into patient's pharmacy or [facility] pharmacy by patient's surgeon.  Reinforced with patient to  the prescription from applicable pharmacy if they haven't already.  Verbal and written instructions given regarding proper use of Chlorhexidine body wash to patient and/or famlily during PAT visit. Patient/family also instructed to complete checklist and return it to Pre-op on the day of surgery.  Patient and/or family verbalized understanding.    Patient denies any current skin issues.     Patient to apply Chlorhexadine wipes  to surgical area (as instructed) the night before procedure and the AM of procedure. Wipes provided.    Patient viewed general PAT education video as instructed in their preoperative information received from their surgeon.  Patient stated the general PAT education video was viewed in its entirety and survey completed.  Copies of PAT general education handouts (Incentive Spirometry, Meds to Beds Program, Patient Belongings, Pre-op skin preparation instructions, Blood Glucose testing, Visitor policy, Surgery FAQ, Code H) distributed to patient if not printed. Education related to the PAT pass and skin preparation for surgery (if applicable) completed in PAT as a reinforcement to PAT education video.  Patient instructed to return PAT pass provided today as well as completed skin preparation sheet (if applicable) on the day of procedure.     Additionally if patient had not viewed video yet but intended to view it at home or in our waiting area, then referred them to the handout with QR code/link provided during PAT visit.  Instructed patient to complete survey after viewing the video in its entirety.  Encouraged patient/family to read PAT general education handouts thoroughly and notify PAT staff with any questions or concerns. Patient verbalized understanding of all information and priority content.    PATIENT EKG ON CHART AND IN EPIC FROM 05/01/2024

## 2024-05-05 LAB
COTININE SERPL-MCNC: <1 NG/ML
NICOTINE SERPL-MCNC: <1 NG/ML

## 2024-05-06 DIAGNOSIS — M17.12 PRIMARY OSTEOARTHRITIS OF LEFT KNEE: Primary | ICD-10-CM

## 2024-05-08 ENCOUNTER — HOSPITAL ENCOUNTER (OUTPATIENT)
Dept: CT IMAGING | Facility: HOSPITAL | Age: 69
Discharge: HOME | End: 2024-05-08
Admitting: ORTHOPAEDIC SURGERY
Payer: MEDICARE

## 2024-05-08 DIAGNOSIS — M17.12 PRIMARY OSTEOARTHRITIS OF LEFT KNEE: ICD-10-CM

## 2024-05-08 PROCEDURE — 73700 CT LOWER EXTREMITY W/O DYE: CPT

## 2024-05-08 RX ORDER — NABUMETONE 750 MG/1
TABLET, FILM COATED ORAL
Qty: 60 TABLET | Refills: 1 | Status: SHIPPED | OUTPATIENT
Start: 2024-05-08

## 2024-05-09 ENCOUNTER — TELEPHONE (OUTPATIENT)
Dept: ORTHOPEDIC SURGERY | Facility: CLINIC | Age: 69
End: 2024-05-09
Payer: MEDICARE

## 2024-05-09 ENCOUNTER — TRANSITIONAL CARE MANAGEMENT TELEPHONE ENCOUNTER (OUTPATIENT)
Dept: ORTHOPEDIC SURGERY | Facility: CLINIC | Age: 69
End: 2024-05-09
Payer: MEDICARE

## 2024-05-09 DIAGNOSIS — M17.12 PRIMARY OSTEOARTHRITIS OF LEFT KNEE: Primary | ICD-10-CM

## 2024-05-14 ENCOUNTER — ANESTHESIA EVENT (OUTPATIENT)
Dept: PERIOP | Facility: HOSPITAL | Age: 69
End: 2024-05-14
Payer: MEDICARE

## 2024-05-14 RX ORDER — SODIUM CHLORIDE 9 MG/ML
40 INJECTION, SOLUTION INTRAVENOUS AS NEEDED
Status: CANCELLED | OUTPATIENT
Start: 2024-05-14

## 2024-05-14 RX ORDER — SODIUM CHLORIDE 0.9 % (FLUSH) 0.9 %
10 SYRINGE (ML) INJECTION EVERY 12 HOURS SCHEDULED
Status: CANCELLED | OUTPATIENT
Start: 2024-05-14

## 2024-05-14 RX ORDER — SODIUM CHLORIDE 0.9 % (FLUSH) 0.9 %
10 SYRINGE (ML) INJECTION AS NEEDED
Status: CANCELLED | OUTPATIENT
Start: 2024-05-14

## 2024-05-14 RX ORDER — FAMOTIDINE 10 MG/ML
20 INJECTION, SOLUTION INTRAVENOUS ONCE
Status: CANCELLED | OUTPATIENT
Start: 2024-05-14 | End: 2024-05-14

## 2024-05-15 ENCOUNTER — APPOINTMENT (OUTPATIENT)
Dept: GENERAL RADIOLOGY | Facility: HOSPITAL | Age: 69
End: 2024-05-15
Payer: MEDICARE

## 2024-05-15 ENCOUNTER — READMISSION MANAGEMENT (OUTPATIENT)
Dept: CALL CENTER | Facility: HOSPITAL | Age: 69
End: 2024-05-15
Payer: MEDICARE

## 2024-05-15 ENCOUNTER — ANESTHESIA (OUTPATIENT)
Dept: PERIOP | Facility: HOSPITAL | Age: 69
End: 2024-05-15
Payer: MEDICARE

## 2024-05-15 ENCOUNTER — HOSPITAL ENCOUNTER (OUTPATIENT)
Facility: HOSPITAL | Age: 69
Discharge: HOME | End: 2024-05-15
Attending: ORTHOPAEDIC SURGERY | Admitting: ORTHOPAEDIC SURGERY
Payer: MEDICARE

## 2024-05-15 ENCOUNTER — ANESTHESIA EVENT CONVERTED (OUTPATIENT)
Dept: ANESTHESIOLOGY | Facility: HOSPITAL | Age: 69
End: 2024-05-15
Payer: MEDICARE

## 2024-05-15 VITALS
DIASTOLIC BLOOD PRESSURE: 82 MMHG | WEIGHT: 215 LBS | BODY MASS INDEX: 30.1 KG/M2 | SYSTOLIC BLOOD PRESSURE: 133 MMHG | HEIGHT: 71 IN | RESPIRATION RATE: 16 BRPM | HEART RATE: 98 BPM | TEMPERATURE: 98 F | OXYGEN SATURATION: 92 %

## 2024-05-15 DIAGNOSIS — M25.521 PAIN OF BOTH ELBOWS: ICD-10-CM

## 2024-05-15 DIAGNOSIS — M17.10 ARTHRITIS OF KNEE: ICD-10-CM

## 2024-05-15 DIAGNOSIS — M25.522 PAIN OF BOTH ELBOWS: ICD-10-CM

## 2024-05-15 DIAGNOSIS — Z96.652 S/P TOTAL KNEE REPLACEMENT, LEFT: ICD-10-CM

## 2024-05-15 DIAGNOSIS — R00.1 SINUS BRADYCARDIA: Primary | ICD-10-CM

## 2024-05-15 DIAGNOSIS — M17.12 PRIMARY OSTEOARTHRITIS OF LEFT KNEE: ICD-10-CM

## 2024-05-15 LAB
GLUCOSE BLDC GLUCOMTR-MCNC: 101 MG/DL (ref 70–130)
POTASSIUM SERPL-SCNC: 3.5 MMOL/L (ref 3.5–5.2)

## 2024-05-15 PROCEDURE — C1755 CATHETER, INTRASPINAL: HCPCS | Performed by: ORTHOPAEDIC SURGERY

## 2024-05-15 PROCEDURE — 97116 GAIT TRAINING THERAPY: CPT

## 2024-05-15 PROCEDURE — 25010000002 CEFAZOLIN PER 500 MG

## 2024-05-15 PROCEDURE — 27447 TOTAL KNEE ARTHROPLASTY: CPT

## 2024-05-15 PROCEDURE — 97162 PT EVAL MOD COMPLEX 30 MIN: CPT

## 2024-05-15 PROCEDURE — 25010000002 CEFAZOLIN PER 500 MG: Performed by: ORTHOPAEDIC SURGERY

## 2024-05-15 PROCEDURE — 27447 TOTAL KNEE ARTHROPLASTY: CPT | Performed by: ORTHOPAEDIC SURGERY

## 2024-05-15 PROCEDURE — 25010000002 KETOROLAC TROMETHAMINE PER 15 MG: Performed by: ORTHOPAEDIC SURGERY

## 2024-05-15 PROCEDURE — P9612 CATHETERIZE FOR URINE SPEC: HCPCS

## 2024-05-15 PROCEDURE — 25010000002 PROPOFOL 10 MG/ML EMULSION: Performed by: ANESTHESIOLOGY

## 2024-05-15 PROCEDURE — 20985 CPTR-ASST DIR MS PX: CPT | Performed by: ORTHOPAEDIC SURGERY

## 2024-05-15 PROCEDURE — 20985 CPTR-ASST DIR MS PX: CPT

## 2024-05-15 PROCEDURE — 25010000002 ROPIVACAINE PER 1 MG: Performed by: ORTHOPAEDIC SURGERY

## 2024-05-15 PROCEDURE — A4648 IMPLANTABLE TISSUE MARKER: HCPCS | Performed by: ORTHOPAEDIC SURGERY

## 2024-05-15 PROCEDURE — 25810000003 LACTATED RINGERS PER 1000 ML: Performed by: ANESTHESIOLOGY

## 2024-05-15 PROCEDURE — 97110 THERAPEUTIC EXERCISES: CPT

## 2024-05-15 PROCEDURE — 25010000002 ROPIVACAINE HCL-NACL 0.2-0.9 % SOLUTION: Performed by: NURSE ANESTHETIST, CERTIFIED REGISTERED

## 2024-05-15 PROCEDURE — 82948 REAGENT STRIP/BLOOD GLUCOSE: CPT

## 2024-05-15 PROCEDURE — G0378 HOSPITAL OBSERVATION PER HR: HCPCS

## 2024-05-15 PROCEDURE — C1776 JOINT DEVICE (IMPLANTABLE): HCPCS | Performed by: ORTHOPAEDIC SURGERY

## 2024-05-15 PROCEDURE — 25010000002 DEXAMETHASONE PER 1 MG: Performed by: ANESTHESIOLOGY

## 2024-05-15 PROCEDURE — 25010000002 MORPHINE PER 10 MG: Performed by: ORTHOPAEDIC SURGERY

## 2024-05-15 PROCEDURE — 25010000002 ONDANSETRON PER 1 MG: Performed by: ANESTHESIOLOGY

## 2024-05-15 PROCEDURE — S0260 H&P FOR SURGERY: HCPCS

## 2024-05-15 PROCEDURE — 73560 X-RAY EXAM OF KNEE 1 OR 2: CPT

## 2024-05-15 PROCEDURE — 25010000002 BUPIVACAINE 0.5 % SOLUTION: Performed by: NURSE ANESTHETIST, CERTIFIED REGISTERED

## 2024-05-15 PROCEDURE — 84132 ASSAY OF SERUM POTASSIUM: CPT | Performed by: ANESTHESIOLOGY

## 2024-05-15 PROCEDURE — 25010000002 BUPIVACAINE (PF) 0.25 % SOLUTION: Performed by: NURSE ANESTHETIST, CERTIFIED REGISTERED

## 2024-05-15 DEVICE — TIBIAL BEARING INSERT - CS
Type: IMPLANTABLE DEVICE | Site: KNEE | Status: FUNCTIONAL
Brand: TRIATHLON

## 2024-05-15 DEVICE — TIBIAL COMPONENT
Type: IMPLANTABLE DEVICE | Site: KNEE | Status: FUNCTIONAL
Brand: TRIATHLON

## 2024-05-15 DEVICE — 2CTX #2 PDO 36 X 36
Type: IMPLANTABLE DEVICE | Site: KNEE | Status: FUNCTIONAL
Brand: STRATAFIX SPIRAL

## 2024-05-15 DEVICE — IMPLANTABLE DEVICE: Type: IMPLANTABLE DEVICE | Site: KNEE | Status: FUNCTIONAL

## 2024-05-15 DEVICE — CRUCIATE RETAINING FEMORAL
Type: IMPLANTABLE DEVICE | Site: KNEE | Status: FUNCTIONAL
Brand: TRIATHLON

## 2024-05-15 DEVICE — PATELLA
Type: IMPLANTABLE DEVICE | Site: KNEE | Status: FUNCTIONAL
Brand: TRIATHLON

## 2024-05-15 RX ORDER — SODIUM CHLORIDE 9 MG/ML
INJECTION, SOLUTION INTRAVENOUS
Status: DISCONTINUED
Start: 2024-05-15 | End: 2024-05-15 | Stop reason: HOSPADM

## 2024-05-15 RX ORDER — ROPIVACAINE HYDROCHLORIDE 2 MG/ML
INJECTION, SOLUTION EPIDURAL; INFILTRATION; PERINEURAL CONTINUOUS
Status: DISCONTINUED | OUTPATIENT
Start: 2024-05-15 | End: 2024-05-15 | Stop reason: HOSPADM

## 2024-05-15 RX ORDER — ONDANSETRON 2 MG/ML
4 INJECTION INTRAMUSCULAR; INTRAVENOUS EVERY 6 HOURS PRN
Status: CANCELLED | OUTPATIENT
Start: 2024-05-15

## 2024-05-15 RX ORDER — ROPIVACAINE HYDROCHLORIDE 2 MG/ML
1 INJECTION, SOLUTION EPIDURAL; INFILTRATION; PERINEURAL CONTINUOUS
Start: 2024-05-15

## 2024-05-15 RX ORDER — TAMSULOSIN HYDROCHLORIDE 0.4 MG/1
1 CAPSULE ORAL DAILY
Qty: 10 CAPSULE | Refills: 0 | Status: SHIPPED | OUTPATIENT
Start: 2024-05-15

## 2024-05-15 RX ORDER — MELOXICAM 15 MG/1
15 TABLET ORAL ONCE
Status: COMPLETED | OUTPATIENT
Start: 2024-05-15 | End: 2024-05-15

## 2024-05-15 RX ORDER — OXYCODONE HYDROCHLORIDE 5 MG/1
5 TABLET ORAL EVERY 4 HOURS PRN
Status: CANCELLED | OUTPATIENT
Start: 2024-05-15 | End: 2024-05-25

## 2024-05-15 RX ORDER — DEXAMETHASONE SODIUM PHOSPHATE 4 MG/ML
INJECTION, SOLUTION INTRA-ARTICULAR; INTRALESIONAL; INTRAMUSCULAR; INTRAVENOUS; SOFT TISSUE AS NEEDED
Status: DISCONTINUED | OUTPATIENT
Start: 2024-05-15 | End: 2024-05-15 | Stop reason: SURG

## 2024-05-15 RX ORDER — VALSARTAN 160 MG/1
160 TABLET ORAL DAILY
Status: DISCONTINUED | OUTPATIENT
Start: 2024-05-15 | End: 2024-05-15 | Stop reason: HOSPADM

## 2024-05-15 RX ORDER — SODIUM CHLORIDE 9 MG/ML
100 INJECTION, SOLUTION INTRAVENOUS CONTINUOUS
Status: CANCELLED | OUTPATIENT
Start: 2024-05-15

## 2024-05-15 RX ORDER — BUPIVACAINE HYDROCHLORIDE 5 MG/ML
INJECTION, SOLUTION PERINEURAL
Status: COMPLETED | OUTPATIENT
Start: 2024-05-15 | End: 2024-05-15

## 2024-05-15 RX ORDER — ACETAMINOPHEN 500 MG
1000 TABLET ORAL EVERY 8 HOURS
Status: CANCELLED | OUTPATIENT
Start: 2024-05-15

## 2024-05-15 RX ORDER — NALOXONE HYDROCHLORIDE 4 MG/.1ML
SPRAY NASAL
Qty: 2 EACH | Refills: 0 | Status: SHIPPED | OUTPATIENT
Start: 2024-05-15

## 2024-05-15 RX ORDER — EPHEDRINE SULFATE 50 MG/ML
5 INJECTION, SOLUTION INTRAVENOUS ONCE AS NEEDED
Status: DISCONTINUED | OUTPATIENT
Start: 2024-05-15 | End: 2024-05-15 | Stop reason: HOSPADM

## 2024-05-15 RX ORDER — FAMOTIDINE 20 MG/1
20 TABLET, FILM COATED ORAL ONCE
Status: COMPLETED | OUTPATIENT
Start: 2024-05-15 | End: 2024-05-15

## 2024-05-15 RX ORDER — OXYCODONE HYDROCHLORIDE 10 MG/1
10 TABLET ORAL EVERY 4 HOURS PRN
Status: CANCELLED | OUTPATIENT
Start: 2024-05-15 | End: 2024-05-25

## 2024-05-15 RX ORDER — BUPIVACAINE HYDROCHLORIDE 2.5 MG/ML
INJECTION, SOLUTION EPIDURAL; INFILTRATION; INTRACAUDAL; PERINEURAL
Status: DISCONTINUED | OUTPATIENT
Start: 2024-05-15 | End: 2024-05-15 | Stop reason: SURG

## 2024-05-15 RX ORDER — OXYCODONE HYDROCHLORIDE 5 MG/1
5 TABLET ORAL EVERY 4 HOURS PRN
Qty: 40 TABLET | Refills: 0 | Status: SHIPPED | OUTPATIENT
Start: 2024-05-15

## 2024-05-15 RX ORDER — TAMSULOSIN HYDROCHLORIDE 0.4 MG/1
0.4 CAPSULE ORAL ONCE
Status: COMPLETED | OUTPATIENT
Start: 2024-05-15 | End: 2024-05-15

## 2024-05-15 RX ORDER — ONDANSETRON 2 MG/ML
INJECTION INTRAMUSCULAR; INTRAVENOUS AS NEEDED
Status: DISCONTINUED | OUTPATIENT
Start: 2024-05-15 | End: 2024-05-15 | Stop reason: SURG

## 2024-05-15 RX ORDER — ASPIRIN 81 MG/1
81 TABLET ORAL EVERY 12 HOURS SCHEDULED
Status: CANCELLED | OUTPATIENT
Start: 2024-05-16

## 2024-05-15 RX ORDER — HYDROMORPHONE HYDROCHLORIDE 1 MG/ML
0.5 INJECTION, SOLUTION INTRAMUSCULAR; INTRAVENOUS; SUBCUTANEOUS
Status: DISCONTINUED | OUTPATIENT
Start: 2024-05-15 | End: 2024-05-15 | Stop reason: HOSPADM

## 2024-05-15 RX ORDER — ACETAMINOPHEN 500 MG
1000 TABLET ORAL ONCE
Status: COMPLETED | OUTPATIENT
Start: 2024-05-15 | End: 2024-05-15

## 2024-05-15 RX ORDER — SODIUM CHLORIDE 0.9 % (FLUSH) 0.9 %
3-10 SYRINGE (ML) INJECTION AS NEEDED
Status: CANCELLED | OUTPATIENT
Start: 2024-05-15

## 2024-05-15 RX ORDER — TRANEXAMIC ACID 10 MG/ML
1000 INJECTION, SOLUTION INTRAVENOUS ONCE
Status: DISCONTINUED | OUTPATIENT
Start: 2024-05-15 | End: 2024-05-15 | Stop reason: HOSPADM

## 2024-05-15 RX ORDER — ASPIRIN 81 MG/1
81 TABLET ORAL 2 TIMES DAILY
Qty: 60 TABLET | Refills: 0 | Status: SHIPPED | OUTPATIENT
Start: 2024-05-16

## 2024-05-15 RX ORDER — NALOXONE HCL 0.4 MG/ML
0.1 VIAL (ML) INJECTION
Status: CANCELLED | OUTPATIENT
Start: 2024-05-15

## 2024-05-15 RX ORDER — PANTOPRAZOLE SODIUM 40 MG/1
40 TABLET, DELAYED RELEASE ORAL
Status: DISCONTINUED | OUTPATIENT
Start: 2024-05-16 | End: 2024-05-15 | Stop reason: HOSPADM

## 2024-05-15 RX ORDER — MELOXICAM 15 MG/1
15 TABLET ORAL DAILY
Status: CANCELLED | OUTPATIENT
Start: 2024-05-15

## 2024-05-15 RX ORDER — LABETALOL HYDROCHLORIDE 5 MG/ML
10 INJECTION, SOLUTION INTRAVENOUS EVERY 4 HOURS PRN
OUTPATIENT
Start: 2024-05-15

## 2024-05-15 RX ORDER — CEFAZOLIN SODIUM 2 G/100ML
2 INJECTION, SOLUTION INTRAVENOUS EVERY 8 HOURS
Status: DISCONTINUED | OUTPATIENT
Start: 2024-05-15 | End: 2024-05-15

## 2024-05-15 RX ORDER — FENTANYL CITRATE 50 UG/ML
50 INJECTION, SOLUTION INTRAMUSCULAR; INTRAVENOUS
Status: DISCONTINUED | OUTPATIENT
Start: 2024-05-15 | End: 2024-05-15 | Stop reason: HOSPADM

## 2024-05-15 RX ORDER — MIDAZOLAM HYDROCHLORIDE 1 MG/ML
0.5 INJECTION INTRAMUSCULAR; INTRAVENOUS
Status: DISCONTINUED | OUTPATIENT
Start: 2024-05-15 | End: 2024-05-15 | Stop reason: HOSPADM

## 2024-05-15 RX ORDER — ACETAMINOPHEN 500 MG
1000 TABLET ORAL EVERY 8 HOURS
Qty: 42 TABLET | Refills: 0 | Status: SHIPPED | OUTPATIENT
Start: 2024-05-15

## 2024-05-15 RX ORDER — SODIUM CHLORIDE, SODIUM LACTATE, POTASSIUM CHLORIDE, CALCIUM CHLORIDE 600; 310; 30; 20 MG/100ML; MG/100ML; MG/100ML; MG/100ML
100 INJECTION, SOLUTION INTRAVENOUS CONTINUOUS
Status: DISCONTINUED | OUTPATIENT
Start: 2024-05-15 | End: 2024-05-15 | Stop reason: HOSPADM

## 2024-05-15 RX ORDER — NALOXONE HCL 0.4 MG/ML
0.4 VIAL (ML) INJECTION AS NEEDED
Status: DISCONTINUED | OUTPATIENT
Start: 2024-05-15 | End: 2024-05-15 | Stop reason: HOSPADM

## 2024-05-15 RX ORDER — PROPOFOL 10 MG/ML
VIAL (ML) INTRAVENOUS AS NEEDED
Status: DISCONTINUED | OUTPATIENT
Start: 2024-05-15 | End: 2024-05-15 | Stop reason: SURG

## 2024-05-15 RX ORDER — TRANEXAMIC ACID 10 MG/ML
1000 INJECTION, SOLUTION INTRAVENOUS ONCE
Status: COMPLETED | OUTPATIENT
Start: 2024-05-15 | End: 2024-05-15

## 2024-05-15 RX ORDER — MAGNESIUM HYDROXIDE 1200 MG/15ML
LIQUID ORAL AS NEEDED
Status: DISCONTINUED | OUTPATIENT
Start: 2024-05-15 | End: 2024-05-15 | Stop reason: HOSPADM

## 2024-05-15 RX ORDER — LIDOCAINE HYDROCHLORIDE 10 MG/ML
0.5 INJECTION, SOLUTION EPIDURAL; INFILTRATION; INTRACAUDAL; PERINEURAL ONCE AS NEEDED
Status: COMPLETED | OUTPATIENT
Start: 2024-05-15 | End: 2024-05-15

## 2024-05-15 RX ORDER — ONDANSETRON 2 MG/ML
4 INJECTION INTRAMUSCULAR; INTRAVENOUS ONCE AS NEEDED
Status: DISCONTINUED | OUTPATIENT
Start: 2024-05-15 | End: 2024-05-15 | Stop reason: HOSPADM

## 2024-05-15 RX ORDER — SODIUM CHLORIDE 0.9 % (FLUSH) 0.9 %
3 SYRINGE (ML) INJECTION EVERY 12 HOURS SCHEDULED
Status: CANCELLED | OUTPATIENT
Start: 2024-05-15

## 2024-05-15 RX ORDER — EPHEDRINE SULFATE 50 MG/ML
INJECTION INTRAVENOUS AS NEEDED
Status: DISCONTINUED | OUTPATIENT
Start: 2024-05-15 | End: 2024-05-15 | Stop reason: SURG

## 2024-05-15 RX ORDER — ONDANSETRON 2 MG/ML
4 INJECTION INTRAMUSCULAR; INTRAVENOUS EVERY 6 HOURS PRN
OUTPATIENT
Start: 2024-05-15

## 2024-05-15 RX ORDER — PREGABALIN 75 MG/1
75 CAPSULE ORAL ONCE
Status: COMPLETED | OUTPATIENT
Start: 2024-05-15 | End: 2024-05-15

## 2024-05-15 RX ORDER — PRAVASTATIN SODIUM 40 MG
40 TABLET ORAL DAILY
Status: DISCONTINUED | OUTPATIENT
Start: 2024-05-15 | End: 2024-05-15 | Stop reason: HOSPADM

## 2024-05-15 RX ORDER — ONDANSETRON 4 MG/1
4 TABLET, ORALLY DISINTEGRATING ORAL EVERY 6 HOURS PRN
Status: CANCELLED | OUTPATIENT
Start: 2024-05-15

## 2024-05-15 RX ORDER — SODIUM CHLORIDE, SODIUM LACTATE, POTASSIUM CHLORIDE, CALCIUM CHLORIDE 600; 310; 30; 20 MG/100ML; MG/100ML; MG/100ML; MG/100ML
9 INJECTION, SOLUTION INTRAVENOUS CONTINUOUS
Status: DISCONTINUED | OUTPATIENT
Start: 2024-05-15 | End: 2024-05-15 | Stop reason: HOSPADM

## 2024-05-15 RX ADMIN — Medication 1000 MG: at 09:51

## 2024-05-15 RX ADMIN — SODIUM CHLORIDE 2 G: 900 INJECTION INTRAVENOUS at 07:30

## 2024-05-15 RX ADMIN — ACETAMINOPHEN 1000 MG: 500 TABLET ORAL at 06:51

## 2024-05-15 RX ADMIN — LIDOCAINE HYDROCHLORIDE 0.5 ML: 10 INJECTION, SOLUTION EPIDURAL; INFILTRATION; INTRACAUDAL; PERINEURAL at 05:56

## 2024-05-15 RX ADMIN — PROPOFOL 30 MG: 10 INJECTION, EMULSION INTRAVENOUS at 07:33

## 2024-05-15 RX ADMIN — PROPOFOL 80 MCG/KG/MIN: 10 INJECTION, EMULSION INTRAVENOUS at 07:26

## 2024-05-15 RX ADMIN — PROPOFOL 30 MG: 10 INJECTION, EMULSION INTRAVENOUS at 07:27

## 2024-05-15 RX ADMIN — DEXAMETHASONE SODIUM PHOSPHATE 4 MG: 4 INJECTION INTRA-ARTICULAR; INTRALESIONAL; INTRAMUSCULAR; INTRAVENOUS; SOFT TISSUE at 07:34

## 2024-05-15 RX ADMIN — PROPOFOL 30 MG: 10 INJECTION, EMULSION INTRAVENOUS at 07:45

## 2024-05-15 RX ADMIN — BUPIVACAINE HYDROCHLORIDE 20 ML: 2.5 INJECTION, SOLUTION EPIDURAL; INFILTRATION; INTRACAUDAL; PERINEURAL at 09:38

## 2024-05-15 RX ADMIN — PROPOFOL 50 MG: 10 INJECTION, EMULSION INTRAVENOUS at 07:20

## 2024-05-15 RX ADMIN — EPHEDRINE SULFATE 5 MG: 50 INJECTION INTRAVENOUS at 07:31

## 2024-05-15 RX ADMIN — SODIUM CHLORIDE 2000 MG: 900 INJECTION INTRAVENOUS at 13:42

## 2024-05-15 RX ADMIN — EPHEDRINE SULFATE 5 MG: 50 INJECTION INTRAVENOUS at 07:52

## 2024-05-15 RX ADMIN — TRANEXAMIC ACID 1000 MG: 10 INJECTION, SOLUTION INTRAVENOUS at 08:48

## 2024-05-15 RX ADMIN — EPHEDRINE SULFATE 5 MG: 50 INJECTION INTRAVENOUS at 08:48

## 2024-05-15 RX ADMIN — PREGABALIN 75 MG: 75 CAPSULE ORAL at 06:51

## 2024-05-15 RX ADMIN — TAMSULOSIN HYDROCHLORIDE 0.4 MG: 0.4 CAPSULE ORAL at 12:56

## 2024-05-15 RX ADMIN — TRANEXAMIC ACID 1000 MG: 10 INJECTION, SOLUTION INTRAVENOUS at 07:30

## 2024-05-15 RX ADMIN — SODIUM CHLORIDE, POTASSIUM CHLORIDE, SODIUM LACTATE AND CALCIUM CHLORIDE 9 ML/HR: 600; 310; 30; 20 INJECTION, SOLUTION INTRAVENOUS at 05:56

## 2024-05-15 RX ADMIN — MELOXICAM 15 MG: 15 TABLET ORAL at 06:51

## 2024-05-15 RX ADMIN — ONDANSETRON 4 MG: 2 INJECTION INTRAMUSCULAR; INTRAVENOUS at 08:48

## 2024-05-15 RX ADMIN — BUPIVACAINE HYDROCHLORIDE 1.8 ML: 5 INJECTION, SOLUTION PERINEURAL at 07:25

## 2024-05-15 RX ADMIN — FAMOTIDINE 20 MG: 20 TABLET, FILM COATED ORAL at 06:51

## 2024-05-15 NOTE — PLAN OF CARE
Goal Outcome Evaluation:  Plan of Care Reviewed With: patient        Progress: improving  Outcome Evaluation: Patient demonstrated good control or walker and was able to ambulate on morales and able to negotiate stairs safely. All instructions given. He will be going home with assistance from his daughter and has outpatient PT set up. No further skilled inpatient PT needed. He does not need OT.      Anticipated Discharge Disposition (PT): home with assist, home with outpatient therapy services

## 2024-05-15 NOTE — ANESTHESIA PROCEDURE NOTES
Spinal Block      Patient reassessed immediately prior to procedure    Patient location during procedure: OR  Indication:at surgeon's request  Performed By  KIANA/CAA: Janusz Bethea CRNASRNA: Rigoberto Viveros SRNA  Preanesthetic Checklist  Completed: patient identified, IV checked, site marked, risks and benefits discussed, surgical consent, monitors and equipment checked, pre-op evaluation and timeout performed  Spinal Block Prep:  Patient Position:sitting  Sterile Tech:cap, gloves, sterile barriers and mask  Prep:Chloraprep  Patient Monitoring:blood pressure monitoring, continuous pulse oximetry and EKG    Spinal Block Procedure  Approach:midline  Guidance:landmark technique and palpation technique  Location:L4-L5  Needle Type:Quincke  Needle Gauge:22 G  Placement of Spinal needle event:cerebrospinal fluid aspirated  Paresthesia: no  Fluid Appearance:clear  Medications: bupivacaine (MARCAINE) 0.5 % injection - Injection   1.8 mL - 5/15/2024 7:25:00 AM   Post Assessment  Patient Tolerance:patient tolerated the procedure well with no apparent complications  Complications no  Additional Notes  Procedure:  Pt assisted to sitting position, with legs in position of comfort over side of bed.  Pt. instructed in optimal spine presentation, the spine was prepped/ Draped and the skin at insertion site was anesthetized with 1% Lidocaine 2 ml.  The spinal needle was then advanced until CSF flow was obtained and LA was injected:

## 2024-05-15 NOTE — ANESTHESIA POSTPROCEDURE EVALUATION
Patient: Chava Santiago    Procedure Summary       Date: 05/15/24 Room / Location:  [facility] /  [facility]    Anesthesia Start: 0715 Anesthesia Stop: 0932    Procedure: TOTAL KNEE ARTHROPLASTY WITH VINNIE ROBOT - LEFT (Left: Knee) Diagnosis:       Primary osteoarthritis of left knee      (Primary osteoarthritis of left knee [M17.12])    Surgeons: Rolando Woodruff MD Provider: Harrison Frazier MD    Anesthesia Type: spinal ASA Status: 2            Anesthesia Type: spinal    Vitals  Vitals Value Taken Time   /76 05/15/24 0945   Temp 97.6 °F (36.4 °C) 05/15/24 0945   Pulse 64 05/15/24 0950   Resp 18 05/15/24 0945   SpO2 93 % 05/15/24 0950   Vitals shown include unfiled device data.        Post Anesthesia Care and Evaluation    Patient location during evaluation: PACU  Patient participation: complete - patient participated  Level of consciousness: sleepy but conscious  Pain score: 0  Pain management: adequate    Airway patency: patent  Anesthetic complications: No anesthetic complications  PONV Status: none  Cardiovascular status: hemodynamically stable and acceptable  Respiratory status: nonlabored ventilation, acceptable and nasal cannula  Hydration status: acceptable  No anesthesia care post op

## 2024-05-15 NOTE — H&P
Patient Name: Chava Santiago  MRN: [id number]  : 1955  DOS: 5/15/2024    Attending: Rolando Woodruff MD    Primary Care Provider: Ciaran Bangura MD      Chief complaint: Left knee pain    Subjective   Patient is a pleasant 68 y.o. male presented for scheduled surgery by Dr. Woodruff.  He underwent left total knee arthroplasty under spinal anesthesia.  He tolerated surgery well and was admitted for further medical management.  His knee has been painful for over 5 years.  He has been using a brace and a cane with ambulation.  He denies recent falls.    When seen in PACU he is doing well.  His pain is well-controlled.  He denies nausea, shortness of breath or chest pain.  No history of DVT or PE.    Allergies:  No Known Allergies    Meds:  Medications Prior to Admission   Medication Sig Dispense Refill Last Dose    chlorhexidine (HIBICLENS) 4 % external liquid Apply  topically to the appropriate area as directed Daily As Needed for Wound Care. Shower daily with hibiclens solution as directed 5 days prior to surgery. 236 mL 0 2024 at 2100    hydroCHLOROthiazide (HYDRODIURIL) 25 MG tablet TAKE ONE TABLET BY MOUTH DAILY 90 tablet 3 5/15/2024 at 0330    nabumetone (RELAFEN) 750 MG tablet TAKE 1 TABLET BY MOUTH TWICE A DAY 60 tablet 1 5/15/2024 at 0330    omeprazole (priLOSEC) 40 MG capsule TAKE ONE CAPSULE BY MOUTH DAILY 30 capsule 11 5/15/2024 at 0330    ondansetron (Zofran) 4 MG tablet Take 1 tablet by mouth Every 8 (Eight) Hours As Needed for Nausea or Vomiting. 10 tablet 1 Past Month    pravastatin (PRAVACHOL) 40 MG tablet TAKE ONE TABLET BY MOUTH DAILY 90 tablet 3 5/15/2024 at 0330    prednisoLONE acetate (PRED FORTE) 1 % ophthalmic suspension    2024 at 2100    traMADol (ULTRAM) 50 MG tablet Take 1 tablet by mouth Every 8 (Eight) Hours As Needed for Moderate Pain. 90 tablet 5 5/15/2024 at 0330    valsartan (DIOVAN) 160 MG tablet TAKE ONE TABLET BY MOUTH DAILY 90 tablet 3 2024  "at 0700    colestipol (COLESTID) 1 g tablet TAKE ONE TABLET BY MOUTH TWICE A DAY AS NEEDED FOR DIARRHEA 180 tablet 1 More than a month    dicyclomine (BENTYL) 20 MG tablet TAKE 1 TABLET BY MOUTH 4 TIMES A DAY AS NEEDED FOR ABDOMINAL PAIN / DIARRHEA 60 tablet 5 More than a month    docusate sodium (Colace) 100 MG capsule Take 1 capsule by mouth 2 (Two) Times a Day As Needed for Constipation. 62 capsule 0 More than a month    sucralfate (Carafate) 1 GM/10ML suspension Take 10 mL by mouth 4 (Four) Times a Day As Needed (ulcer pain). 414 mL 11 More than a month         History:   Past Medical History:   Diagnosis Date    Acute bronchitis     Acute pharyngitis     Conjunctivitis     CTS (carpal tunnel syndrome)     Gout     Hyperlipidemia     Hypertension     IBS (irritable bowel syndrome)     Knee swelling     Lumbago     Rhinitis     Tear of meniscus of knee      Past Surgical History:   Procedure Laterality Date    COLONOSCOPY      EYE SURGERY Bilateral     multiple on both eyes, RIGHT EYE DETACHED RETINA, LEFT EYE HAD EYELID SURGERY    HAND SURGERY  3/11/2022    BILATERAL CARPA TUNNEL RELEASE    HERNIA REPAIR Right     x2 INGUINAL    KNEE SURGERY Left     arthroscopy with meniscal repair- 10 years ago    VASECTOMY      WRIST SURGERY Left     CTR 3/11/22     Family History   Adopted: Yes     Social History     Tobacco Use    Smoking status: Former     Current packs/day: 0.00     Types: Cigarettes     Quit date: 1983     Years since quittin.3    Smokeless tobacco: Never   Vaping Use    Vaping status: Never Used   Substance Use Topics    Alcohol use: Not Currently     Comment: occ    Drug use: No   He lives with his daughter.  He has 6 children.  He works security.    Review of Systems  All systems were reviewed and negative except for:  Gastrointestinal: positive for  diarrhea    Vital Signs  /89   Pulse 75   Temp 97 °F (36.1 °C) (Temporal)   Resp 16   Ht 180.3 cm (70.98\")   Wt 97.5 kg (215 lb)   " SpO2 96%   BMI 30.00 kg/m²     Physical Exam:    General Appearance:    Alert, cooperative, in no acute distress   Head:    Normocephalic, without obvious abnormality, atraumatic   Eyes:            Lids and lashes normal, conjunctivae and sclerae normal, no   icterus, no pallor, corneas clear,    Ears:    Ears appear intact with no abnormalities noted   Throat:   No oral lesions, no thrush, oral mucosa moist   Neck:   No adenopathy, supple, trachea midline, no thyromegaly    Lungs:     Clear to auscultation,respirations regular, even and unlabored    Heart:    Regular rhythm and normal rate, normal S1 and S2, no murmur, no gallop   Abdomen:     Normal bowel sounds, no masses, no organomegaly, soft non-tender, non-distended, no guarding, no rebound  tenderness   Genitalia:    Deferred   Extremities: Left knee Ace wrap CDI.  Nerve block present.   Pulses:   Pulses palpable and equal bilaterally   Skin:   No bleeding, bruising or rash   Neurologic:   Cranial nerves 2 - 12 grossly intact. Flexion and dorsiflexion intact bilateral feet.        I reviewed the patient's new clinical results.     Results from last 7 days   Lab Units 05/15/24  0618   POTASSIUM mmol/L 3.5     Lab Results   Component Value Date    HGBA1C 5.50 05/01/2024      Latest Reference Range & Units 05/01/24 14:01   Sodium 136 - 145 mmol/L 142   Potassium 3.5 - 5.2 mmol/L 3.4 (L)   Chloride 98 - 107 mmol/L 103   CO2 22.0 - 29.0 mmol/L 26.0   Anion Gap 5.0 - 15.0 mmol/L 13.0   BUN 8 - 23 mg/dL 19   Creatinine 0.76 - 1.27 mg/dL 1.10   BUN/Creatinine Ratio 7.0 - 25.0  17.3   eGFR >60.0 mL/min/1.73 72.2   Glucose 65 - 99 mg/dL 93   Calcium 8.6 - 10.5 mg/dL 9.6   Alkaline Phosphatase 39 - 117 U/L 87   Total Protein 6.0 - 8.5 g/dL 7.3   Albumin 3.5 - 5.2 g/dL 4.3   Globulin gm/dL 3.0   A/G Ratio g/dL 1.4   AST (SGOT) 1 - 40 U/L 18   ALT (SGPT) 1 - 41 U/L 19   Total Bilirubin 0.0 - 1.2 mg/dL 0.2   Hemoglobin A1C 4.80 - 5.60 % 5.50   Protime 12.2 - 14.5 Seconds  12.1 (L)   INR 0.89 - 1.12  0.89   PTT 22.0 - 39.0 seconds 28.4   WBC 3.40 - 10.80 10*3/mm3 5.77   RBC 4.14 - 5.80 10*6/mm3 4.52   Hemoglobin 13.0 - 17.7 g/dL 13.8   Hematocrit 37.5 - 51.0 % 41.4   Platelets 140 - 450 10*3/mm3 278   RDW 12.3 - 15.4 % 13.2   MCV 79.0 - 97.0 fL 91.6   MCH 26.6 - 33.0 pg 30.5   MCHC 31.5 - 35.7 g/dL 33.3   MPV 6.0 - 12.0 fL 9.9   (L): Data is abnormally low    Assessment and Plan:     Status post total left knee replacement    Arthritis of knee    Essential hypertension, benign    Hyperlipidemia      Plan  1. PT/OT- WBAT LLE  2. Pain control-prns, AC nerve block   3. IS-encourage  4. DVT proph- Mechs/ASA  5. Bowel regimen  6. Resume home medications as appropriate  7. Monitor post-op labs  8. DC planning for home    HTN, Hyperlipidemia  - Continue home Diovan and statin  - Monitor BP   - Holding parameters for BP meds  - Labetalol PRN for SBP>170      ASHLYN Robison  05/15/24  12:19 EDT

## 2024-05-15 NOTE — H&P
"Pre-Op H&P  Chava Santiago  [id number]  1955    Chief complaint: \" I am here for a total knee replacement on my left knee.\"    HPI:    Patient is a 68 y.o.male who presents with primary osteoarthritis of left knee.  He presents today for scheduled surgery and anticipates a left total knee arthroplasty with Lauro robot-LEFT.  Patient reports severe left knee pain localized to the left knee joint and ongoing for multiple years.  Pain is exacerbated by standing, walking, weightbearing activities.  Conservative treatment methods such as cortisone injections and anti-inflammatories have not provided adequate results and therefore he seeks surgical intervention.  He denies taking any anticoagulant or antiplatelet medications.  Patient denies any symptomatic changes or recent illnesses since last office visit on 4/4/2024.    Review of Systems:  General ROS: negative for chills, fever or skin lesions;  No changes since last office visit.  Neg for recent sick exposure  Cardiovascular ROS: no chest pain or dyspnea on exertion  Respiratory ROS: no cough, shortness of breath, or wheezing    Allergies: Denies allergy to latex or contrast dye.  No Known Allergies    Home Meds:    No current facility-administered medications on file prior to encounter.     Current Outpatient Medications on File Prior to Encounter   Medication Sig Dispense Refill    chlorhexidine (HIBICLENS) 4 % external liquid Apply  topically to the appropriate area as directed Daily As Needed for Wound Care. Shower daily with hibiclens solution as directed 5 days prior to surgery. 236 mL 0    hydroCHLOROthiazide (HYDRODIURIL) 25 MG tablet TAKE ONE TABLET BY MOUTH DAILY 90 tablet 3    omeprazole (priLOSEC) 40 MG capsule TAKE ONE CAPSULE BY MOUTH DAILY 30 capsule 11    ondansetron (Zofran) 4 MG tablet Take 1 tablet by mouth Every 8 (Eight) Hours As Needed for Nausea or Vomiting. 10 tablet 1    pravastatin (PRAVACHOL) 40 MG tablet TAKE ONE TABLET BY MOUTH " DAILY 90 tablet 3    prednisoLONE acetate (PRED FORTE) 1 % ophthalmic suspension       traMADol (ULTRAM) 50 MG tablet Take 1 tablet by mouth Every 8 (Eight) Hours As Needed for Moderate Pain. 90 tablet 5    valsartan (DIOVAN) 160 MG tablet TAKE ONE TABLET BY MOUTH DAILY 90 tablet 3    colestipol (COLESTID) 1 g tablet TAKE ONE TABLET BY MOUTH TWICE A DAY AS NEEDED FOR DIARRHEA 180 tablet 1    dicyclomine (BENTYL) 20 MG tablet TAKE 1 TABLET BY MOUTH 4 TIMES A DAY AS NEEDED FOR ABDOMINAL PAIN / DIARRHEA 60 tablet 5    docusate sodium (Colace) 100 MG capsule Take 1 capsule by mouth 2 (Two) Times a Day As Needed for Constipation. 62 capsule 0    sucralfate (Carafate) 1 GM/10ML suspension Take 10 mL by mouth 4 (Four) Times a Day As Needed (ulcer pain). 414 mL 11       PMH:   Past Medical History:   Diagnosis Date    Acute bronchitis     Acute pharyngitis     Conjunctivitis     CTS (carpal tunnel syndrome)     Gout     Hyperlipidemia     Hypertension     IBS (irritable bowel syndrome)     Knee swelling     Lumbago     Rhinitis     Tear of meniscus of knee      PSH:    Past Surgical History:   Procedure Laterality Date    COLONOSCOPY      EYE SURGERY Bilateral     multiple on both eyes, RIGHT EYE DETACHED RETINA, LEFT EYE HAD EYELID SURGERY    HAND SURGERY  3/11/2022    BILATERAL CARPA TUNNEL RELEASE    HERNIA REPAIR Right     x2 INGUINAL    KNEE SURGERY Left     arthroscopy with meniscal repair- 10 years ago    VASECTOMY      WRIST SURGERY Left     CTR 3/11/22       Immunization History:  Influenza: No  Pneumococcal: No  Tetanus: No    Social History:   Tobacco:   Social History     Tobacco Use   Smoking Status Former    Current packs/day: 0.00    Types: Cigarettes    Quit date: 1983    Years since quittin.3   Smokeless Tobacco Never      Alcohol:     Social History     Substance and Sexual Activity   Alcohol Use Not Currently    Comment: occ       Vitals:           There were no vitals taken for this  visit.    Physical Exam:  General Appearance:    Alert, cooperative, no distress, appears stated age   Head:    Normocephalic, without obvious abnormality, atraumatic   Lungs:     Clear to auscultation bilaterally, respirations unlabored    Heart:   Regular rate and rhythm, S1 and S2 normal, no murmur, rub    or gallop    Abdomen:    Soft, nontender.  +bowel sounds   Breast Exam:    deferred   Genitalia:    deferred   Extremities:   Extremities normal, atraumatic, no cyanosis or edema   Skin:   Skin color, texture, turgor normal, no rashes or lesions   Neurologic:   Grossly intact   Results Review  LABS:  Lab Results   Component Value Date    WBC 5.77 05/01/2024    HGB 13.8 05/01/2024    HCT 41.4 05/01/2024    MCV 91.6 05/01/2024     05/01/2024    NEUTROABS 2.88 05/01/2024    GLUCOSE 93 05/01/2024    BUN 19 05/01/2024    CREATININE 1.10 05/01/2024    EGFRIFNONA 79 05/19/2020     05/01/2024    K 3.5 05/15/2024     05/01/2024    CO2 26.0 05/01/2024    MG 2.4 11/29/2023    CALCIUM 9.6 05/01/2024    ALBUMIN 4.3 05/01/2024    AST 18 05/01/2024    ALT 19 05/01/2024    BILITOT 0.2 05/01/2024    PTT 28.4 05/01/2024    INR 0.89 05/01/2024       RADIOLOGY:  No radiology results for the last 3 days     I reviewed the patient's new clinical results.    Cancer Staging (if applicable)  Cancer Patient: __ yes __no __unknown; If yes, clinical stage T:__ N:__M:__, stage group or __N/A    Impression: Patient presents with primary osteoarthritis of left knee.    Plan: Dr. Woodruff will perform a left total knee arthroplasty with Lauro robot-LEFT.       García Gillette PA-C   05/15/24   6:43 AM EDT

## 2024-05-15 NOTE — OP NOTE
OPERATIVE REPORT     DATE OF PROCEDURE: 5/15/2024    SURGEON: Rolando Woodurff M.D.     ASSISTANT(S): Circulator: Nyla Ojeda RN  Scrub Person: Katie Russo RN  Nursing Assistant: Aiyana Wagoner  Assistant: García Gillette PA-C  Assistant: García Gillette PA-C    Note-PA was utilized during the case to facilitate positioning the patient, exposure, retraction, placement of final components and definitive closure.    PREOPERATIVE DIAGNOSIS: Advanced degenerative joint disease of the left knee secondary to osteoarthritis    POSTOPERATIVE DIAGNOSIS: same     PROCEDURE: Left total Knee Arthroplasty CPT 33062, Use of computer-assisted surgical navigation system CPT 95465     SURGICAL DETAILS:     APPROACH: Medial parapatellar     ANESTHESIA: Spinal plus local periarticular block    PREOPERATIVE ANTIBIOTICS: Ancef 2 g IV    TRANEXAMIC ACID: IV    TOURNIQUET TIME: 68 min @300 mmHg     ESTIMATED BLOOD LOSS: 25 cc     SPECIMENS: None    IMPLANTS:   /Brand: [employer]  Tibial component size: 5 pressfit tritanium baseplate   Femoral component size: 5 pressfit cruciate retaining   Tibial polyethylene insert: 10 mm cruciate stabilizing   Patellar component: 32 mm asymmetric tritanium  Cement: None    DRAINS: None    LOCAL INJECTION: 1 cc Toradol 30mg/ml, 4 cc duramorph 2mg/ml, 20 cc 0.5% ropivicaine, 20 cc 0.5% lidocaine with 1:200,000 epinephrine, 15 cc preservative free normal saline     MODIFIER(S): None    COMPLICATIONS: None apparent    INDICATIONS FOR PROCEDURE: The patient has a long history of progressive knee pain, arthritis, and degeneration resulting in deformity in the left knee from predominantly medial wear and bone loss. Non-operative treatment and conservative therapeutic measures have been attempted, but have not improved or controlled the symptoms and pain that occurs during normal daily activities. Knee motion has also become limited and is restricting the patient. Total knee  arthroplasty was recommended. The risks, benefits, alternatives, and potential complications of the arthroplasty surgery were discussed with the patient in detail to include but not be limited to infection, bleeding, anesthesia risks, damage to neurovascular structures, osteolysis, aseptic loosening, instability, anterior knee pain, continued pain, iatrogenic fracture, dislocation, need for future surgery including the potential for amputation, blood clots, myocardial infarction, stroke, and death. Specific details of the surgical procedure, hospitalization, recovery, rehabilitation, and long-term precautions were also presented. Pre-operative teaching was provided. Implant/prosthesis selection was outlined, and the many options available were explained; the final choice will be made at the time of the procedure to match the anatomy and condition of the bone, ligaments, tendons, and muscles. The patient completed preoperative medical optimization and risk assessment, joint arthroplasty education, and MRSA decolonization using a universal decolonization protocol. Perioperative blood management and the potential for blood transfusion were discussed with risks and options clearly outlined.     INTRAOPERATIVE FINDINGS: Advanced tricompartmental osteoarthritis with genu varum alignment    PROCEDURE: The patient was identified in the preoperative holding area. The operative site was confirmed and marked. A sequential compression device was placed on the nonoperative leg. The risks, benefits, and alternatives to surgery were again confirmed with the patient and the patient wished to proceed. The patient was brought to the operating room and placed on the operating room table in the supine position. All bony prominences were padded. A huddle was performed with the patient and all vital surgical team members to confirm the correct operative site, procedure, anesthesia type, and operative plan with the patient. After  anesthesia was performed, a tourniquet was applied to the upper thigh of the operative leg. A full knee exam was performed once anesthesia was in full effect. Intravenous antibiotic prophylaxis was given and confirmed with the anesthesia team.     The operative leg was prepped and draped in the usual sterile fashion. A surgical time out was performed immediately preceding the incision with all personnel in the operating room to confirm patient identity, the correct operative site and extremity, correct radiographic studies, availability of appropriate surgical equipment and agreement on the planned procedure. The operative knee was elevated and exsanguinated using an esmarch and the tourniquet was inflated. The knee was exposed using a limited anterior-midline skin incision. Dissection was carried down through skin and subcutaneous tissue to the extensor mechanism with a scalpel. A medial parapatellar arthrotomy was made to enter the knee space sharply. A large amount of normal appearing joint fluid was encountered and suctioned. The synovium was thickened, hypertrophic, and inflamed. A partial synovectomy was performed for exposure, and the medial and lateral gutters were cleared of scar and synovial reflections. The superficial medial collateral ligament was carefully elevated off osteophytes .  The patellar synovial reflections were released and the patella exposed to reveal complete wear through the articular surface. The trochlea demonstrated similar severe wear. The patella was then subluxed laterally. The knee was then flexed up to 90 degrees.     Assessment of the knee joint revealed severe end-stage articular damage with no remaining medial weight bearing cartilage. The medial compartment was severely eburnated with bone loss on the medial tibia and medial femoral condyle, resulting in the varus deformity.     With the exposure complete, femoral pins for navigation were drilled and placed intra-incisional  in the region of the medial distal femoral metaphysis just proximal to the medial epicondyle.  Tibial pins for navigation were then placed extra-incisional 4 fingerbreadths below the tibial tubercle taking care to engage the far cortex of the tibia but not perforate the cortex.  The navigation arrays were then placed onto the pins, adjusted, and tightened definitively.  The femoral and tibial checkpoints were then placed.    The knee was then taken through range of motion to find the hip center.  The medial and lateral malleolus were then marked to find the ankle center. Next, using a rongeur and osteotome, marginal osteophytes were then removed from the tibia and the femur and the ACL resected.  Baseline measurements of the knee were then taken and the knee was balanced with adjustments made to varus and valgus on the femur and tibia as well as femoral rotation.  Adjustments were as follows:    Femur: 2 degrees varus, 4 degrees external rotation relative to the transepicondylar axis.  0.5 mm distal and 0.5 mm anterior translation  Tibia: 1 degree varus, 3 degrees posterior slope, 1 mm proximal translation    Satisfied with the balance of the knee, attention was turned to bony resection.  The tibia was cut first and the tibial bone removed.  A tensioner was then placed on the resected surface to tension the knee in both flexion and extension.  The adjustments made precut were found to remain grossly unchanged with overall good alignment and balance of the knee in flexion and extension.  Next, femoral cuts were made starting with the posterior femoral cuts followed by the anterior femoral cut, followed by the anterior chamfer.  The sawblade was then changed and the distal femoral cut and posterior chamfers were completed.    A lamina  was placed with the knee in 90 degrees of flexion and a large curved osteotome, rongeur, and curettes were utilized to clear posterior osteophytes. The medial/lateral meniscal  remnants were then excised along with any loose bodies.     A femoral trial implant was placed; excellent fit was confirmed. The medial-lateral and anterior-posterior dimensions were checked; anatomic fit and coverage were achieved.The proximal tibia baseplate trial was placed with its mid-point at the junction of medial one-third and lateral two-thirds of the tibial tubercle and pinned to this fixed position. A trial reduction was then performed. Trial reduction demonstrated the knee achieved full extension with excellent stability and range of motion, and no tendency toward instability with varus-valgus stress at full extension, mid-flexion, or 90 degrees of flexion. The PCL was also found to be appropriately tensioned with normal posterior tibial excursion.  The tibia and femoral pins and arrays were then removed along with the femoral and tibial checkpoints.    Next, attention was turned to the patella. It was measured and the posterior 9-10 mm was resected leaving a healthy remnant with greater than 11mm thickness. The patella was sized with the asymmetric guide, and drill holes were made. A trial button was placed and tracking of the patella and the entire knee trial was tested. The patella tracking was excellent throughout range of motion with no instability. Punches and drills were then placed through the trials to accommodate the final implants. All trials were removed.     The wound was copiously lavaged with a pulse irrigation/suction system. The posterior recess of the knee and areas of known bleeding were treated with the electrocautery to reduce post-operative bleeding. A pain cocktail was injected into the matheus-articular tissues. The cut bone surfaces were then irrigated again, suctioned, and dried. The final implants were impacted into place, tibia followed by tibial polyethylene followed by femur followed by patella. The tourniquet was released and no excessive bleeding was encountered. Synovial  bleeding was further treated with the electrocautery until adequate hemostasis was obtained.     The wound was again irrigated with dilute betadine solution followed by saline. The extensor mechanism and capsule was then anatomically closed with interrupted #1 Vicryl suture and a running #2 Stratafix stitch. Knee stability and range of motion with the capsule closed was excellent, and range of motion was 0 to 135 degrees without excessive stress on the repair. Instrument and sponge count was completed and confirmed correct. Deep and superficial subcutaneous tissue was closed with interrupted 2-0 Vicryl suture. A running 3-0 Monocryl subcuticular stitch was used to re-approximate the skin edges followed by skin glue adhesive to seal the wound. A silver impregnated dressing was then placed over the knee incision and a Covaderm over the tibial pin incisions, followed by a sequential compression device to the operative limb. The patient was sufficiently recovered from anesthesia, transferred to a hospital bed and taken to the PACU in stable condition.     One gram (1000 mg) of intravenous tranexamic acid was administered prior to incision. A second one gram (1000 mg) intravenous dose was given prior to wound closure.    No apparent complications occurred during the procedure. Instrument, sponge and needle counts were correct x 2.     The patient underwent risk stratification preoperatively and aspirin was chosen for DVT prophylaxis. Delay in starting chemical prophylaxis for 23 hours from surgical incision was over concerns for hematoma formation and wound related issues.     POST OPERATIVE PLAN:   Weight bearing as tolerated with knee range of motion as tolerated   Pain control with PO/IV meds   Adductor canal catheter placement by Anesthesia Pain Management Team in PACU.   23 hours perioperative antibiotic prophylaxis   PT/OT for mobilization and medical equipment needs   Keep silver dressing in place for 7 days post  op. Change dressing only if saturated.   SCDs to bilateral lower extremities   Social work for discharge planning needs   Follow up in 3 weeks for post operative wound check with 3 views of operative knee.

## 2024-05-15 NOTE — OUTREACH NOTE
Prep Survey      Flowsheet Row Responses   [facility]id[facility] facility patient discharged from? [facility]   Is LACE score < 7 ? Yes   Eligibility [facility]   Date of Admission 05/15/24   Date of Discharge 05/15/24   Discharge Disposition Home or Self Care   Discharge diagnosis Total knee arthroplasty with john robot-left   Does the patient have one of the following disease processes/diagnoses(primary or secondary)? Total Joint Replacement   Does the patient have Home health ordered? No   Is there a DME ordered? No  [pt discharged with RW]   Prep survey completed? Yes            WILI PAVON - Registered Nurse

## 2024-05-15 NOTE — BRIEF OP NOTE
TOTAL KNEE ARTHROPLASTY WITH VINNIE ROBOT  Progress Note    Chava Santiago  5/15/2024    Pre-op Diagnosis:   Primary osteoarthritis of left knee [M17.12]       Post-Op Diagnosis Codes:     * Primary osteoarthritis of left knee [M17.12]    Procedure/CPT® Codes:  [place] ARTHRP KNE CONDYLE&PLATU MEDIAL&LAT COMPARTMENTS [84872]  [place] CPTR-ASST SURGICAL NAVIGATION IMAGE-LESS [68146]      Procedure(s):  TOTAL KNEE ARTHROPLASTY WITH VINNIE ROBOT - LEFT    Surgical Approach: Knee Medial Parapatellar            Surgeon(s):  Rolando Woodruff MD    Anesthesia: Spinal with Block    Staff:   Circulator: Nyla Ojeda RN  Scrub Person: Katie Russo RN  Nursing Assistant: Aiyana Wagoner  Assistant: García Gillette PA-C  Assistant: García Gillette PA-C      Estimated Blood Loss:  25 mL    Urine Voided: * No values recorded between 5/15/2024  7:15 AM and 5/15/2024  8:52 AM *    Specimens:                None          Drains: * No LDAs found *    Findings: Advanced tricompartmental osteoarthritis with genu varum alignment        Complications: None apparent    Assistant: García Gillette PA-C  was responsible for performing the following activities: Retraction, Suction, Irrigation, Suturing, Closing, and Placing Dressing and their skilled assistance was necessary for the success of this case.    Rolando Woodruff MD     Date: 5/15/2024  Time: 09:24 EDT

## 2024-05-15 NOTE — CASE MANAGEMENT/SOCIAL WORK
Continued Stay Note  [facility]     Patient Name: Chava Santiago  MRN: [id number]  Today's Date: 5/15/2024    Admit Date: 5/15/2024    Plan: Home with RW   Discharge Plan       Row Name 05/15/24 1132       Plan    Plan Home with RW    Plan Comments Same day surgery-Pt. needs a RW. CM has placed RW order.  MSW has spoken with hospital liaison ([facility]) and he will deliver to bedside.  MSW updated RN. No other needs or concerns.    Final Discharge Disposition Code 01 - home or self-care                   Discharge Codes    No documentation.                       WILDER Muhammad

## 2024-05-15 NOTE — DISCHARGE INSTRUCTIONS
"DISCHARGE INSTRUCTIONS   Dr. Woodruff     Total Knee Replacement/ Partial (Uni) Knee Replacement     Wound Care   1) Keep wound / incision area clean and dry.   2) Dressing to remain in place until post-operative day 7. Upon dressing removal, assess for wound drainage. If no drainage is present, keep wound / incision area open to air as much as possible. If drainage is present, place sterile dressing to cover wound and assess daily. If drainage continues to occur after post-operative day 14, call the office for an urgent appointment. (You should be seen in the clinic within 1-2 days of calling). DO NOT REMOVE SUTURES (IF PRESENT) UNDER ANY CIRCUMSTANCES PRIOR TO FOLLOW UP APPOINTMENT.  3) No baths or swimming until otherwise instructed. The wound must remain dry for 10 days after surgery. After 10 days, you may begin to shower only if no drainage is present. No submerging the wound under standing water until cleared by your physician (no baths, hot tubs, swimming pools, etc). Sponge baths are the best way to perform personal hygiene while at the same time protecting the wound from moisture.   4) Prior to showering, the wound must remain dry for 72 consecutive hours (no drainage whatsoever) prior to showering. If the wound drains or spots, the clock \"resets\" - make sure the wound has been drainage-free for 72 consecutive hours.   5) Once you are allowed to get the wound wet, please use gentle soap to wash the wound area. DO NOT aggressively scrub the wound with a washcloth or bath sponge. Please visually inspect your wound(s) at least once daily. If the wound(s) are in a difficult to see location, please use a mirror or have someone else assist with visual inspection.   6) No scrubbing the wound. You may \"pad dry\" the wound, but do not rub, as this may open up the wound and pre-dispose to wound infection.   7) Do not apply lotions or creams to incision site, unless instructed otherwise.   8) Observe for redness, " "swelling, or drainage. Please call the clinic immediately if you have fevers, chills with warmth/redness surrounding wound site or if you notice pus drainage from the wound site     Activity   No heavy lifting objects greater than 10 pounds.   No driving while on narcotic pain medication.   No submerging wound under standing water (pool, bath tub, etc.) until otherwise instructed.   You may be protected weightbearing as tolerated on your operative (left lower) extremity   Use crutches or a walker for ambulation.   Wean as appropriate per physical therapist's discretion.   Do not sleep with a pillow behind your knee. You may sleep with a pillow behind your Achilles or foot. This will prevent your knee from getting stiff in the flexed (\"bent\") position and will encourage full extension (leg straightening).   Be vigilant in terms of working on full knee extension and flexion. Your goal should be 0 to 90 degrees by 2 weeks post-op - MINIMUM!   Knee range of motion as tolerated.    Blood Clot Prophylaxis   (Aspirin vs. Lovenox vs. Eliquis administration is determined by your surgeon and tailored to your specific risk profile. You will be discharged with one of these medications.) You will need to complete a total 4 week course of enteric coated aspirin 325 mg (or 81mg) twice daily or Eliquis 2.5mg twice daily, in order to minimize your risk of blood clots following surgery. You will be supplied with a prescription to obtain this. Alternatively, you will need to compete a total 2 week course of Lovenox after surgery (followed by a 2 week course of aspirin twice daily), in order to minimize the risk of blood clots following surgery. Lovenox requires a single shot in the abdomen, to be taken once daily. You will be supplied with the prescription to obtain this. Prior to your discharge from the hospital, the nursing staff will instruct you on self-administration of the Lovenox, if you will be returning directly home from the " "hospital.     Discharge Pain Medications   You will be given a prescription for pain medication. You should start taking this the same day after your surgery. Wean off as tolerated. Do not wait to take the pain medication until the pain is severe, as it will be difficult to \"catch up\" once this occurs. The pain medication usually reaches its full effect ~1 hour after ingesting. If you have been sent home on Colace, this medication should be taken until you are off all narcotic (i.e. Oxycodone, etc) pain medications, in order to prevent constipation. If you have been sent home with a combination of oxycodone and Tylenol, please take Tylenol as scheduled.  You must be careful not to exceed 4,000mg (4 grams) of Tylenol. The oxycodone is to be taken as needed for \"breakthrough\" pain.  Some common side effects of the narcotic pain medications include nausea and itching. Benadryl is a great over the counter medication that helps calm your stomach, decreases your anxiety levels, and minimizes the itching. You can easily purchase this at your local pharmacy as an over-the-counter medication. Please abide by the instructions as printed on the bottle. If your nausea persists, make sure to take small amounts of crackers or other lighter foods.     Follow-Up   Follow-up with Dr. Woodruff's office in 3 weeks from the surgery date for a post-operative evaluation. Have the following xrays done upon arrival to the follow-up appointment: 3 views of operative knee. Please call Dr. Woodruff's office at [phone] for orthopaedic appointments or questions.        In[employer]BLOCK - Patient Information    What is a pain pump?  The InfuBLOCK pump delivers post-operative, non-narcotic, numbing medication to the nerve near the surgical site for pain relief.     Where can I find information about my pain pump?           For more information about your pain pump, scan the QR code.  For additional patient resources, visit " [email or web address].                                                                                               While your physician is your primary source for information about your treatment there may be times during your treatment that you need assistance with your infusion pump.     If you need assistance take the following steps:    The [employer] Nursing Hotline is Here for You 24/7.  Please call [phone] for the following concerns or complications:    Answers to questions about your infusion pump                 Tubing disconnect  Assistance with pump alarms                                                      Dislodged catheter  Excessive leakage noted from pump                                         Inadequate pain control    2.   [facility] Acute Pain [facility]: [phone] is available 24/7 for any further needs or concerns about medication or pain control.     -------------------------------------------------------------------------    Nerve Catheter Removal Instructions  When your device is empty:    Remove your catheter by pulling the dressing off slowly (like you would remove a regular bandage). The catheter should pull right out of the skin.  Check that the BLUE tip is intact.                                                                                     If the catheter is stuck, reposition your   extremity and pull slowly until removed.  *If catheter is HURTING and WON'T come out, stop and call [phone] for further assistance.    Remove medication bag from the black carrying case.  Cut the tubing on right and left side of pump, and discard the medication bag and tubing into garbage.  Place the pump and black carrying case into the plastic bag and then place this into the return box.  Seal box with blue stickers and return to [employer] postal service. THIS IS PRE-PAID  POSTAGE.        -------------------------------------------------------------------------    SMI COLD THERAPY - PATIENT INSTRUCTION SHEET    Cold Compression Therapy for your comfort and rehabilitation  Your caregivers want you to be productive in your rehab and comfortable during your stay. In keeping with those goals, you will be receiving an SMI Cold Therapy Wrap to help ease post-operative pain and swelling that might keep you from getting back on track! Your SMI Cold Therapy Wrap is effective and simple-to-use, and you will be encouraged to apply it throughout your hospital stay and at home through the duration of your recovery.    When you are ready to go home  Be sure to take your SMI Cold Therapy Wrap and both sets of Gel Bags with you for continued comfort and use throughout your rehabilitation. If you don't already have them, ask your nurse or aide to retrieve your SMI Gel Bags from the patient freezer.    Home use precautions  Always follow your medical professional's application instructions upon discharge. Your SMI Cold Therapy Wrap and Gel Bags are designed to last for months following your surgery. Never heat the Gel Bags unless specified by your healthcare provider. Supervision is advised when using this product on children or geriatric patients. To avoid danger of suffocation, please keep the outer plastic packaging away from children & pets.    Cold Therapy Instructions  Place Gel Bags in a freezer set ¾ of the way to max temperature for at least (4) hours. For best results, lay the Gel Bags flat and [id number] in the freezer. Once frozen, slide Gel Bags into the gel pouch and secure your wrap to the affected area with the straps.  Gel wraps that have been stored in a freezer for an extended period of time may require a (10) minute period of softening up in a room temperature environment before application.  The gel pouch acts as a protective barrier. NEVER place frozen bags directly onto skin,  as this may cause frostbite injury.  The [facility] Cold Therapy Wrap is designed to be able to be worm while ambulating. The compression straps can be secured well enough so that the Wrap won't fall off while moving.  Wrap Application Videos can be viewed at [email or web address].  An additional protective barrier such as clothing, a washcloth, hand-towel or pillowcase may be used during prolonged treatment applications.  The Gel-Pouch and Wrap are both Latex-Free and the Gel Bag ingredients are non toxic.    [facility] Wrap care instructions  The [facility] Cold Therapy Wrap may be hand washed and hung to dry when needed.    [facility] re-order information  Additional [facility] body specific wraps and/or Gel Bags can be re-ordered from [email or web address] or call [employer] ([phone])

## 2024-05-15 NOTE — ANESTHESIA PROCEDURE NOTES
LEFT Adductor canal cath      Patient reassessed immediately prior to procedure    Patient location during procedure: post-op  Start time: 5/15/2024 9:38 AM  Reason for block: at surgeon's request and post-op pain management  Performed by  CRNA/CAA: Lucas Garcias CRNA  Assisted by: Annabelle Christopher RNSRNA: Rigoberto Viveros SRNA  Preanesthetic Checklist  Completed: patient identified, IV checked, site marked, risks and benefits discussed, surgical consent, monitors and equipment checked, pre-op evaluation and timeout performed  Prep:  Pt Position: supine  Sterile barriers:cap, gloves, mask, sterile barriers and washed/disinfected hands  Prep: ChloraPrep  Patient monitoring: blood pressure monitoring, continuous pulse oximetry and EKG  Procedure    Sedation: no  Performed under: spinal  Guidance:ultrasound guided    ULTRASOUND INTERPRETATION.  Using ultrasound guidance a 20 G gauge needle was placed in close proximity to the nerve, at which point, under ultrasound guidance anesthetic was injected in the area of the nerve and spread of the anesthesia was seen on ultrasound in close proximity thereto.  There were no abnormalities seen on ultrasound; a digital image was taken; and the patient tolerated the procedure with no complications. Images:still images obtained, printed/placed on chart    Laterality:left  Block Type:adductor canal block  Injection Technique:catheter  Needle Type:Tuohy and echogenic  Needle Gauge:18 G  Resistance on Injection: none  Catheter Size:20 G (20g)  Cath Depth at skin: 8 cm    Medications Used: bupivacaine PF (MARCAINE) 0.25 % injection - Injection   20 mL - 5/15/2024 9:38:00 AM      Post Assessment  Injection Assessment: negative aspiration for heme, incremental injection and no paresthesia on injection  Patient Tolerance:comfortable throughout block  Complications:no  Additional Notes  CATHETER   A high-frequency linear transducer, with sterile cover, was placed on the anterior  "mid-thigh (between the anterior superior iliac spine and patella). The transducer was then moved medially to identify the Sartorius muscle (Eugenio), Vastus Medialis muscle (VMM), Superficial Femoral Artery (SFA) and Vein. The transducer was then moved cephalad or caudad to position the SFA in the middle of the Eugenio. The insertion site was prepped and draped in sterile fashion. Skin and cutaneous tissue was infiltrated with 2-5 ml of 1% Lidocaine. Using ultrasound-guidance, an 18-gauge Diagnostic Biochipsiplex Ultra 360 Touhy needle was advanced in plane from lateral to medial. Preservative-free normal saline was utilized for hydro-dissection of tissue, advancement of Touhy, and to confirm needle placement below the fascial plane of the Eugenio where the Nerve to the VMM is located. Local anesthetic (LA) 5 ml deposited here. The Touhy needle continues its path lateral to the SFA at the level of the Saphenous Nerve. The remainder of the LA was deposited at the 10-11 o'clock position of the SFA. This injection created a space between the Eugenio and the SFA. Aspiration every 5 ml to prevent intravascular injection. Injection was completed with negative aspiration of blood and negative intravascular injection. Injection pressures were normal with minimal resistance. A 20-gauge Diagnostic Biochipsiplex Echo catheter was placed through the needle and advance out the tip of the Touhy 3-5 cm anterior to the SFA. The Touhy needle was then removed, and final catheter position verified at the 12 o'clock position to the SFA. The catheter was secured in the usual fashion with skin glue, benzoin, steri-strips, CHG tegaderm and label noting \"Nerve Block Catheter\". Jerk tape applied at yellow connector and catheter connection.             "

## 2024-05-15 NOTE — THERAPY DISCHARGE NOTE
Patient Name: Chava Santiago  : 1955    MRN: [id number]                              Today's Date: 5/15/2024       Admit Date: 5/15/2024    Visit Dx:     ICD-10-CM ICD-9-CM   1. Sinus bradycardia  R00.1 427.89   2. Primary osteoarthritis of left knee  M17.12 715.16   3. Pain of both elbows  M25.521 719.42    M25.522    4. 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    PCO (posterior capsular opacification), left    Retinal detachment, right    IBS (irritable bowel syndrome)    Trigger ring finger of left hand    Trigger middle finger of right hand    Trigger ring finger of right hand    Arthritis of knee     Past Medical History:   Diagnosis Date    Acute bronchitis     Acute pharyngitis     Conjunctivitis     CTS (carpal tunnel syndrome)     Gout     Hyperlipidemia     Hypertension     IBS (irritable bowel syndrome)     Knee swelling     Lumbago     Rhinitis     Tear of meniscus of knee      Past Surgical History:   Procedure Laterality Date    COLONOSCOPY      EYE SURGERY Bilateral     multiple on both eyes, RIGHT EYE DETACHED RETINA, LEFT EYE HAD EYELID SURGERY    HAND SURGERY  3/11/2022    BILATERAL CARPA TUNNEL RELEASE    HERNIA REPAIR Right     x2 INGUINAL    KNEE SURGERY Left     arthroscopy with meniscal repair- 10 years ago    VASECTOMY      WRIST SURGERY Left     CTR 3/11/22      General Information       Row Name 05/15/24 1122          Physical Therapy Time and Intention     Document Type evaluation  -SC     Mode of Treatment physical therapy  -SC       Row Name 05/15/24 1122          General Information    Patient Profile Reviewed yes  -SC     Prior Level of Function independent:;gait;transfer;using stairs  uses cane on stairs  -SC     Existing Precautions/Restrictions other (see comments)  nerve cath  -SC     Barriers to Rehab none identified  -SC       Row Name 05/15/24 1122          Living Environment    People in Home alone;other (see comments)  daughter to assist  -SC       Row Name 05/15/24 1122          Home Main Entrance    Number of Stairs, Main Entrance ten  -SC     Stair Railings, Main Entrance railings safe and in good condition  -SC       Row Name 05/15/24 1122          Stairs Within Home, Primary    Number of Stairs, Within Home, Primary none  -SC       Row Name 05/15/24 1122          Cognition    Orientation Status (Cognition) oriented x 4  -SC       Row Name 05/15/24 1122          Safety Issues, Functional Mobility    Impairments Affecting Function (Mobility) motor control;strength;range of motion (ROM);sensation/sensory awareness  -SC     Comment, Safety Issues/Impairments (Mobility) alert, following commands  -SC               User Key  (r) = Recorded By, (t) = Taken By, (c) = Cosigned By      Initials Name Provider Type    SC Kelton Sagastume, PT Physical Therapist                   Mobility       Row Name 05/15/24 1123          Bed Mobility    Bed Mobility supine-sit;sit-supine;scooting/bridging  -SC     Scooting/Bridging [place] (Bed Mobility) independent;verbal cues  -SC     Supine-Sit [place] (Bed Mobility) independent;verbal cues  -SC     Sit-Supine [place] (Bed Mobility) independent;verbal cues  -SC     Assistive Device (Bed Mobility) head of bed elevated  -SC     Comment, (Bed Mobility) cues for sequencing and caring for nerve cath. Demonstrated good technique  -SC       Row Name 05/15/24 1123          Transfers    Comment, (Transfers) cues for  hand placement for safety. Demonstrated good technique  -SC       Row Name 05/15/24 1123          Sit-Stand Transfer    Sit-Stand [place] (Transfers) verbal cues;standby assist  -SC     Assistive Device (Sit-Stand Transfers) walker, front-wheeled  -SC       Row Name 05/15/24 1123          Gait/Stairs (Locomotion)    [place] Level (Gait) 1 person to manage equipment;1 person assist;contact guard;verbal cues  -SC     Assistive Device (Gait) walker, front-wheeled  -SC     Patient was able to Ambulate yes  -SC     Distance in Feet (Gait) 200  -SC     Deviations/Abnormal Patterns (Gait) left sided deviations;stride length decreased;weight shifting decreased;gait speed decreased  -SC     Assistive Device (Stairs) cane, straight  -SC     Handrail Location (Stairs) right side (ascending)  -SC     Number of Steps (Stairs) 10  -SC     Comment, (Gait/Stairs) Gt training focused on Controling walker with step through gait pattern. Encouraged equal step length and equal wt shifting. Also encouraged staying close to walker especially on turns. Demonstrated good tecnique and no buckling or loss of balance. Also worked on negotiating stairs with a cane. Cues for sequencing step given. Patient demonstrated good control on stairs.  -SC               User Key  (r) = Recorded By, (t) = Taken By, (c) = Cosigned By      Initials Name Provider Type    SC Kelton Sagastume, PT Physical Therapist                   Obj/Interventions       [facility] Name 05/15/24 1128          Range of Motion Comprehensive    Comment, General Range of Motion L knee5-95 deg  -[facility] Name 05/15/24 1128          Strength Comprehensive (MMT)    General Manual Muscle Testing (MMT) Assessment lower extremity strength deficits identified  -SC     Comment, General Manual Muscle Testing (MMT) Assessment L LE tib ant 4/5, quads functioallly 4/5  R LE grossly 4=/5  -SC       Row Name 05/15/24 1128          Motor Skills    Therapeutic Exercise knee;ankle  -SC        Row Name 05/15/24 1128          Knee (Therapeutic Exercise)    Knee (Therapeutic Exercise) isometric exercises;strengthening exercise  -SC     Knee Isometrics (Therapeutic Exercise) left;quad sets;10 repetitions  -SC     Knee Strengthening (Therapeutic Exercise) left;heel slides;LAQ (long arc quad);SLR (straight leg raise);SAQ (short arc quad);10 repetitions  -SC       Row Name 05/15/24 1128          Ankle (Therapeutic Exercise)    Ankle (Therapeutic Exercise) AROM (active range of motion)  -SC     Ankle AROM (Therapeutic Exercise) bilateral;dorsiflexion;20 repititions  -SC       Row Name 05/15/24 1128          Balance    Balance Assessment standing dynamic balance  -SC     Dynamic Standing Balance 1-person assist;1 person to manage equipment;contact guard  -SC     Position/Device Used, Standing Balance supported;walker, rolling  -SC     Comment, Balance no loss of balance or bucking  -SC       Row Name 05/15/24 1128          Sensory Assessment (Somatosensory)    Sensory Assessment (Somatosensory) other (see comments)  diminished L thigh  -SC               User Key  (r) = Recorded By, (t) = Taken By, (c) = Cosigned By      Initials Name Provider Type    SC Kelton Sagastume, PT Physical Therapist                   Goals/Plan    No documentation.                  Clinical Impression       Row Name 05/15/24 1130          Pain    Pretreatment Pain Rating 0/10 - no pain  -SC     Posttreatment Pain Rating 0/10 - no pain  -SC       Row Name 05/15/24 1130          Plan of Care Review    Plan of Care Reviewed With patient  -SC     Progress improving  -SC     Outcome Evaluation Patient demonstrated good control or walker and was able to ambulate on morales and able to negotiate stairs safely. All instructions given. He will be going home with assistance from his daughter and has outpatient PT set up. No further skilled inpatient PT needed.  -SC       Row Name 05/15/24 1130          Therapy Assessment/Plan (PT)     Patient/Family Therapy Goals Statement (PT) go home  -SC     Rehab Potential (PT) good, to achieve stated therapy goals  -SC     Criteria for Skilled Interventions Met (PT) yes;meets criteria  -SC     Therapy Frequency (PT) evaluation only  -SC       Row Name 05/15/24 1130          Vital Signs    Pre Systolic BP Rehab 133  -SC     Pre Treatment Diastolic BP 80  -SC       Row Name 05/15/24 1130          Positioning and Restraints    Pre-Treatment Position in bed  -SC     Post Treatment Position bed  -SC     In Bed supine;with nsg  -SC               User Key  (r) = Recorded By, (t) = Taken By, (c) = Cosigned By      Initials Name Provider Type    SC Kelton Sagastume, PT Physical Therapist                   Outcome Measures       Row Name 05/15/24 1133          How much help from another person do you currently need...    Turning from your back to your side while in flat bed without using bedrails? 4  -SC     Moving from lying on back to sitting on the side of a flat bed without bedrails? 4  -SC     Moving to and from a bed to a chair (including a wheelchair)? 3  -SC     Standing up from a chair using your arms (e.g., wheelchair, bedside chair)? 3  -SC     Climbing 3-5 steps with a railing? 3  -SC     To walk in hospital room? 3  -SC     AM-PAC 6 Clicks Score (PT) 20  -SC     Highest Level of Mobility Goal 6 --> Walk 10 steps or more  -[facility] Name 05/15/24 1133          PADD    Diagnosis 1  -SC     Gender 2  -SC     Age Group 1  -SC     Gait Distance 1  -SC     Assist Level 1  -SC     Home Support 3  -SC     PADD Score 9  -SC     Prediction by PADD Score directly home (with home health or out-patient rehab)  -[facility] Name 05/15/24 1133          Functional Assessment    Outcome Measure Options AM-PAC 6 Clicks Basic Mobility (PT);PADD  -SC               User Key  (r) = Recorded By, (t) = Taken By, (c) = Cosigned By      Initials Name Provider Type    Kelton Judge, PT Physical Therapist                   Physical Therapy Education       Title: PT OT SLP Therapies (Done)       Topic: Physical Therapy (Done)       Point: Mobility training (Done)       Learning Progress Summary             Patient Eager, E,D,TB, VU by SC at 5/15/2024 1134    Comment: reviewed safety with nerve cath and HEP  discussed no pillows under knee and keeping leg up                         Point: Home exercise program (Done)       Learning Progress Summary             Patient Eager, E,D,TB, VU by SC at 5/15/2024 1134    Comment: reviewed safety with nerve cath and HEP  discussed no pillows under knee and keeping leg up                         Point: Body mechanics (Done)       Learning Progress Summary             Patient Eager, E,D,TB, VU by SC at 5/15/2024 1134    Comment: reviewed safety with nerve cath and HEP  discussed no pillows under knee and keeping leg up                         Point: Precautions (Done)       Learning Progress Summary             Patient Eager, E,D,TB, VU by SC at 5/15/2024 1134    Comment: reviewed safety with nerve cath and HEP  discussed no pillows under knee and keeping leg up                                         User Key       Initials Effective Dates Name Provider Type Discipline    SC 02/03/23 -  Kelton Sagastume, PT Physical Therapist PT                  PT Recommendation and Plan     Plan of Care Reviewed With: patient  Progress: improving  Outcome Evaluation: Patient demonstrated good control or walker and was able to ambulate on morales and able to negotiate stairs safely. All instructions given. He will be going home with assistance from his daughter and has outpatient PT set up. No further skilled inpatient PT needed.     Time Calculation:   PT Evaluation Complexity  History, PT Evaluation Complexity: 3 or more personal factors and/or comorbidities  Examination of Body Systems (PT Eval Complexity): total of 4 or more elements  Clinical Presentation (PT Evaluation Complexity): evolving  Clinical  Decision Making (PT Evaluation Complexity): moderate complexity  Overall Complexity (PT Evaluation Complexity): moderate complexity     PT Charges       Row Name 05/15/24 1041             Time Calculation    Start Time 1041  -SC      PT Received On 05/15/24  -SC         Timed Charges    99456 - PT Therapeutic Exercise Minutes 15  -SC      42646 - Gait Training Minutes  15  -SC         Untimed Charges    PT Eval/Re-eval Minutes 35  -SC         Total Minutes    Timed Charges Total Minutes 30  -SC      Untimed Charges Total Minutes 35  -SC       Total Minutes 65  -SC                User Key  (r) = Recorded By, (t) = Taken By, (c) = Cosigned By      Initials Name Provider Type    SC Kelton Sagastume, PT Physical Therapist                  Therapy Charges for Today       Code Description Service Date Service Provider Modifiers Qty    73550914754 HC PT THER PROC EA 15 MIN 5/15/2024 Kelton Sagastume, PT GP 1    87189055298 HC GAIT TRAINING EA 15 MIN 5/15/2024 Kelton Sagastume, PT GP 1    01466596776 HC PT EVAL MOD COMPLEXITY 3 5/15/2024 Kelton Sagastume, PT GP 1    21940132811 HC PT THER SUPP EA 15 MIN 5/15/2024 Kelton Sagastume, PT GP 3            PT G-Codes  Outcome Measure Options: AM-PAC 6 Clicks Basic Mobility (PT), PADD  AM-PAC 6 Clicks Score (PT): 20    PT Discharge Summary  Anticipated Discharge Disposition (PT): home with assist, home with outpatient therapy services    Kelton Sagastume, PT  5/15/2024

## 2024-05-15 NOTE — ANESTHESIA PREPROCEDURE EVALUATION
Anesthesia Evaluation                  Airway   Mallampati: I  TM distance: >3 FB  Neck ROM: full  No difficulty expected  Dental      Pulmonary    Cardiovascular     ECG reviewed    (+) hypertension      Neuro/Psych  (+) numbness  GI/Hepatic/Renal/Endo    (+) GERD    Musculoskeletal     Abdominal    Substance History      OB/GYN          Other   arthritis,                 Anesthesia Plan    ASA 2     spinal     (Acb   left marked)    Anesthetic plan, risks, benefits, and alternatives have been provided, discussed and informed consent has been obtained with: patient.    Plan discussed with CRNA.    CODE STATUS:

## 2024-05-16 ENCOUNTER — TELEPHONE (OUTPATIENT)
Dept: ORTHOPEDIC SURGERY | Facility: CLINIC | Age: 69
End: 2024-05-16
Payer: MEDICARE

## 2024-05-16 ENCOUNTER — TRANSITIONAL CARE MANAGEMENT TELEPHONE ENCOUNTER (OUTPATIENT)
Dept: CALL CENTER | Facility: HOSPITAL | Age: 69
End: 2024-05-16
Payer: MEDICARE

## 2024-05-16 ENCOUNTER — TREATMENT (OUTPATIENT)
Dept: PHYSICAL THERAPY | Facility: CLINIC | Age: 69
End: 2024-05-16
Payer: MEDICARE

## 2024-05-16 DIAGNOSIS — Z96.652 STATUS POST LEFT KNEE REPLACEMENT: Primary | ICD-10-CM

## 2024-05-16 DIAGNOSIS — M25.60 RANGE OF MOTION DEFICIT: ICD-10-CM

## 2024-05-16 DIAGNOSIS — M25.562 ACUTE PAIN OF LEFT KNEE: ICD-10-CM

## 2024-05-16 DIAGNOSIS — R53.1 WEAKNESS: ICD-10-CM

## 2024-05-16 NOTE — PROGRESS NOTES
Physical Therapy Initial Evaluation and Plan of Care  [facility]  [facility] [address] [address]    Patient: Chava Santiago   : 1955  Diagnosis/ICD-10 Code:  No primary diagnosis found.  Referring practitioner: Rolando Woodruff MD  Date of Initial Visit: 2024  Today's Date: 2024  Patient seen for 1 session         Visit Diagnoses:  No diagnosis found.      Subjective Questionnaire: LEFS: 4      Subjective Evaluation    History of Present Illness  Mechanism of injury: The patient underwent a left TKA performed 5/15/2024 by Dr. Woodruff.  He presents to PT 1 day post surgery with postop dressing in place.  He has a pain pump but stated that pain is 10/10.  He is also taking pain medication with no significant relief.  He has been trying to use ice and elevation for pain relief but does not feel anything helps.  He has a [employer] tech bike but has not begun using it.    He generally lives alone but his daughter and grandson are with him for the next week.  He has 1 flight of stairs to his bedroom and has already been up and down 1 time.  He works as a  at [employer] but will be off work for the next several weeks.    Pain  Current pain rating: 10  Location: Left knee  Quality: dull ache and throbbing  Relieving factors: ice and medications  Aggravating factors: movement    Social Support  Lives in: multiple-level home  Lives with: alone    Diagnostic Tests  X-ray: normal    Treatments  No previous or current treatments  Discharged from (in last 30 days): inpatient hospitalization  Patient Goals  Patient goals for therapy: decreased pain, decreased edema, improved balance, increased motion, increased strength, return to sport/leisure activities, return to work and independence with ADLs/IADLs             Objective          Observations     Additional Knee Observation Details  Moderate swelling in the left knee and distal lower extremity with pitting  edema.  Postoperative dressing covering incision with no apparent signs of active bleeding or infection.    Palpation   Left   Tenderness of the distal biceps femoris, distal semimembranosus, distal semitendinosus, lateral gastrocnemius, medial gastrocnemius, rectus femoris, vastus lateralis and vastus medialis.     Tenderness   Left Knee   Tenderness in the lateral joint line, medial joint line, patellar tendon and quadriceps tendon.     Additional Tenderness Details  Patient reported tenderness to palpation throughout the left knee- nonspecific    Active Range of Motion   Left Knee   Flexion: 90 degrees   Extension: 12 degrees     Right Knee   Flexion: 125 degrees   Extension: 0 degrees   Extensor la degrees     Additional Active Range of Motion Details  Unable to perform straight leg raise on left    Passive Range of Motion   Left Knee   Flexion: 100 degrees   Extension: 10 degrees     Strength/Myotome Testing     Left Knee   Quadriceps contraction: poor    Right Knee   Flexion: 5  Extension: 5  Quadriceps contraction: good    Additional Strength Details  Formal strength testing deferred on left    Ambulation     Comments   Patient ambulated slowly with a front wheeled walker, minimal weightbearing on the left lower extremity, minimal left knee excursion, and wide stance.          Assessment & Plan       Assessment  Impairments: abnormal gait, abnormal muscle firing, abnormal or restricted ROM, activity intolerance, impaired balance, impaired physical strength, lacks appropriate home exercise program and pain with function   Functional limitations: lifting, walking, uncomfortable because of pain, standing, stooping and unable to perform repetitive tasks   Assessment details: The patient is a 67 yo male who presented to PT with evolving characteristics of acute L knee pain, ROM deficits, weakness, and gait abnormality with moderate complexity, s/p left TKA performed 5/15/2024 by Dr. Woodruff.  The patient  currently has a pain pump and pain medication but reported 10/10 pain and was guarded with the physical assessment.  Extension was 25 degrees from neutral when initially measured, but he responded well to heel prop positioning and achieved 12/90 degrees of total motion by the end of the visit.  Light active range of motion exercises, stretches, and quad setting activities were prescribed for his initial HEP.  He also has a ROM tech bike and was instructed to begin using it tomorrow.  He was educated on the use of ice and elevation throughout the day to manage swelling. I expect the patient to make a timely recovery with skilled PT intervention.     Prognosis: good    Goals  Plan Goals: Short Term Goals (4 weeks):     1. The patient will be independent and compliant with initial HEP.     2. The patient will report pain at rest 3/10 or less and worst pain 6/10 or less.    3. The patient will display decreased TTP in the L knee jt line and dec mm tension in the surrounding musculature.    4.  L knee AROM will improve to ext/flex 0/115 deg.    5. The patient will demonstrate inc strength evidenced by MMT as follows: hip abd 4+/5, hip ext 4+/5, knee ext 4+/5, and knee flex 4+/5.    6. LEFS will improve by 9 points or more.     7. The pt will ambulate 200 feet with no AD and gait pattern free of apparent deviations.        Long Term Goals (8 weeks):     1. The patient will be appropriate for independent management and compliant with progressed HEP.     2. The patient will report pain at rest 1/10 or less and worst pain 3/10 or less.    3. The patient will display L knee AROM 0/120.    4. The patient will demonstrate good VMO activation evidenced by performance of a SLR with 3# and no extensor lag.    5. The patient will return to work duties and/or ADLs with no limitations due to knee pain or dysfunction.    6. The patient will return to recreational and community activities with no limitations due to knee pain or  dysfunction.      Plan  Therapy options: will be seen for skilled therapy services  Planned modality interventions: cryotherapy, electrical stimulation/Russian stimulation, iontophoresis, TENS and thermotherapy (hydrocollator packs)  Planned therapy interventions: ADL retraining, body mechanics training, balance/weight-bearing training, flexibility, functional ROM exercises, gait training, home exercise program, joint mobilization, manual therapy, neuromuscular re-education, postural training, soft tissue mobilization, strengthening, stretching, therapeutic activities and transfer training  Frequency: 2x week  Duration in visits: 16  Duration in weeks: 8  Treatment plan discussed with: patient  Plan details: The pt will likely benefit from TE/TA/NMED to improve strength of the hips, quads, and hamstrings, to improve balance, and to restore normal proprioception. MT will be utilized to improve ROM and jt mobility. Modalities will be used as needed for pain modulation.         History # of Personal Factors and/or Comorbidities: MODERATE (1-2)  Examination of Body System(s): # of elements: MODERATE (3)  Clinical Presentation: EVOLVING  Clinical Decision Making: MODERATE      Timed:         Manual Therapy:    0     mins  04493;     Therapeutic Exercise:    25     mins  78772;     Neuromuscular Evelio:    0    mins  95683;    Therapeutic Activity:     0     mins  53269;     Gait Trainin     mins  82441;     Ultrasound:     0     mins  29274;    Ionto                               0    mins   79824  Self Care                       0     mins   66655  Canalith Repos    0     mins 24673      Un-Timed:  Electrical Stimulation:    0     mins  99120 ( );  Dry Needling     0     mins self-pay  Traction     0     mins 57740  Low Eval     0     Mins  67959  Mod Eval     20     Mins  73604  High Eval                       0     Mins  50337        Timed Treatment:   25   mins   Total Treatment:     45    mins          PT: Carter Anna PT     License Number: [id number]  Electronically signed by Carter Anna PT, 05/16/24, 10:09 AM EDT    Certification Period: 5/16/2024 thru 8/13/2024  I certify that the therapy services are furnished while this patient is under my care.  The services outlined above are required by this patient, and will be reviewed every 90 days.         Physician Signature:__________________________________________________    PHYSICIAN: Rolando Woodruff MD  NPI: [id number]                                      DATE:      Please sign and return via fax to [email or web address] . Thank you, [facility].

## 2024-05-16 NOTE — TELEPHONE ENCOUNTER
I called and left a message with the patient.  I let him know that I was checking to see how his foot was doing.  I did explain that have not received a picture yet.    Alecia

## 2024-05-16 NOTE — TELEPHONE ENCOUNTER
Please refer to patient msg,     Patient returned call and advised pump machine is working properly and is taking medication as scheduled. Patient is able to move toes however states is it very difficult and painful to move around. Patient is concerned of toes being discolored as they are still showing gray.  Patient currently in pain and rates pain at level 10.     Please advise   Chava [phone]

## 2024-05-16 NOTE — OUTREACH NOTE
Call Center TCM Note      Flowsheet Row Responses   [facility] facility patient discharged from? [facility]   Does the patient have one of the following disease processes/diagnoses(primary or secondary)? Total Joint Replacement   Joint surgery performed? Knee   TCM attempt successful? No   Unsuccessful attempts Attempt 1            Maria Esther Duke LPN    5/16/2024, 09:56 EDT

## 2024-05-16 NOTE — OUTREACH NOTE
Call Center TCM Note      Flowsheet Row Responses   [facility] patient discharged from? [place]   Does the patient have one of the following disease processes/diagnoses(primary or secondary)? Total Joint Replacement   Joint surgery performed? Knee   TCM attempt successful? Yes   Call start time 1737   Call end time 1740   Discharge diagnosis Total knee arthroplasty with john robot-left   Person spoke with today (if not patient) and relationship patient   Does the patient have all medications related to this admission filled (includes all antibiotics, pain medications, etc.) Yes   Is the patient taking all medications as directed (includes completed medication regime)? Yes   Is the patient able to teach back alternate methods of pain control? Ice   Does the patient have an appointment with their PCP within 7-14 days of discharge? Other   Nursing Interventions Patient desires to follow up with specialty only   Psychosocial issues? No   Has the patient began therapy sessions (either in the home or as an out patient)? Yes   If the patient has started attending therapy, what post op day did they begin to attend (either in home or as an out patient)?   Started therapy today   Has the patient fallen since discharge? No   Did the patient receive a copy of their discharge instructions? Yes   Nursing interventions Reviewed instructions with patient   What is the patient's perception of their functional status since discharge? Improving   Is the patient able to teach back signs and symptoms of infection? Temp >100.4 for 24h or longer, Incisional drainage, Severe discomfort or pain, Increased swelling or redness around incision (not associated with surgical edema)   Is the patient able to teach back how to prevent infection? Check incision daily   If the patient is a current smoker, are they able to teach back resources for cessation? Not a smoker   Is the patient/caregiver able to teach back the hierarchy of who to  call/visit for symptoms/problems? PCP, Specialist, Home health nurse, Urgent Care, ED, 911 Yes   TCM call completed? Yes   Call end time 1740   Would this patient benefit from a Referral to [facility] Social Work? No   Is the patient interested in additional calls from an ambulatory ? No            Edouard Valente RN    5/16/2024, 17:40 EDT

## 2024-05-17 ENCOUNTER — TRANSITIONAL CARE MANAGEMENT TELEPHONE ENCOUNTER (OUTPATIENT)
Dept: ORTHOPEDIC SURGERY | Facility: CLINIC | Age: 69
End: 2024-05-17
Payer: MEDICARE

## 2024-05-17 NOTE — TELEPHONE ENCOUNTER
I called and left a message that I was just calling to check on his leg and foot.  If he has any concerns he will give us a call.  Alecia

## 2024-05-17 NOTE — OUTREACH NOTE
Patient: Chava Santiago    : 1955    Age/Gender: 68 y.o. male    Surgeon: DR. PATRICK HERNÁNDEZ    Surgical Procedure: LT TKA    Surgery Date: 05/15/2024    Discharge Date: 05/15/2024        How have you been doing since discharge?     2.  Are you able to eat/drink?    3. Are you having any nausea or vomiting?    4. Are you having any problems with your bowels, or passing gas?    5. Are you able to get around and walk?    6. Do you have any pain?    7. Pain rating at rest:     8. Pain rating while walkin. Have you completed your first session of physical therapy?    10. Were all your medications, wound care instructions, and  follow up appointments discussed with you prior to discharge?    11. Discharge Location:       Additional Comments:     24 - ATTEMPTED DISCHARGE FOLLOW-UP CALL. NO ANSWER. LEFT MESSAGE FOR PATIENT TO RETURN MY CALL.

## 2024-05-21 ENCOUNTER — TREATMENT (OUTPATIENT)
Dept: PHYSICAL THERAPY | Facility: CLINIC | Age: 69
End: 2024-05-21
Payer: MEDICARE

## 2024-05-21 DIAGNOSIS — R53.1 WEAKNESS: ICD-10-CM

## 2024-05-21 DIAGNOSIS — M25.562 ACUTE PAIN OF LEFT KNEE: ICD-10-CM

## 2024-05-21 DIAGNOSIS — M25.60 RANGE OF MOTION DEFICIT: ICD-10-CM

## 2024-05-21 DIAGNOSIS — Z96.652 STATUS POST LEFT KNEE REPLACEMENT: Primary | ICD-10-CM

## 2024-05-21 PROCEDURE — 97140 MANUAL THERAPY 1/> REGIONS: CPT | Performed by: PHYSICAL THERAPIST

## 2024-05-21 PROCEDURE — 97110 THERAPEUTIC EXERCISES: CPT | Performed by: PHYSICAL THERAPIST

## 2024-05-21 NOTE — PROGRESS NOTES
Physical Therapy Daily Treatment Note  [facility] [place]  [address], [address]  [place] [address]      Patient: Chava Santiago   : 1955  Referring practitioner: Rolando Woodruff MD  Date of Initial Visit: Type: THERAPY  Noted: 2024  Today's Date: 2024  Patient seen for 2 sessions       Visit Diagnoses:    ICD-10-CM ICD-9-CM   1. Status post left knee replacement  Z96.652 V43.65   2. Range of motion deficit  M25.60 719.50   3. Acute pain of left knee  M25.562 719.46   4. Weakness  R53.1 780.79       Subjective Evaluation    History of Present Illness  Mechanism of injury: The pt stated that his knee pain has improved slightly. He continues to experience relatively constant mod-severe pain. He has been mostly compliant with his HEP but stated he was feeling bad yesterday and did not do much. He has been walking around the house with a walker.     Pain  Current pain ratin  Location: L knee         Objective          Active Range of Motion     Additional Active Range of Motion Details  30/88 deg L knee AROM upon presentation  10/95 after treatment      See Exercise, Manual, and Modality Logs for complete treatment.       Assessment & Plan       Assessment  Assessment details: The patient presented with a very short standard walker causing stooped posture and altered ambulation.  His friend purchased a new walker for him and brought it during the visit.  It was set up for his height and he walked much better as a result.  He continues to experience severe pain in the knee and was apprehensive with flexion and extension.  He was educated on the importance of stretching frequently throughout the day, and I anticipate reduced pain as his motion improves.  He has swelling in the lower extremity that is expected for his postoperative state, but he would benefit from more routine weightbearing to the left lower extremity.  He was prescribed weight shifts and calf raises today and  tolerated them well.    Plan  Plan details: Continue active range of motion exercises, quad setting, and manual therapy to improve mobility.          Timed:         Manual Therapy:    15     mins  39129;     Therapeutic Exercise:    25     mins  19089;     Neuromuscular Evelio:    0    mins  14890;    Therapeutic Activity:     0     mins  08719;     Gait Trainin     mins  83036;     Ultrasound:     0     mins  88112;    Ionto                               0    mins   84557  Self Care                       0     mins   84664  Canalith Repos    0     mins 72891      Un-Timed:  Electrical Stimulation:    0     mins  23910 ( );  Dry Needling     0     mins self-pay  Traction     0     mins 33309      Timed Treatment:   40   mins   Total Treatment:     60   mins    Carter Anna, PT  [place] License: [id number]                   Pt p/w from rehab after pt pulled  PEG tube.   Unable to place PEG tube in ED  s/p NGT insertions 2/8 (which was aborted due to nosebleed) and 2/10 (which pt pulled out).    GI found  no window for PEG placement.   s/p PEG placement 2/15 per Surgery  Continue PEG tube feeding   Aspiration precautions

## 2024-05-23 ENCOUNTER — TREATMENT (OUTPATIENT)
Dept: PHYSICAL THERAPY | Facility: CLINIC | Age: 69
End: 2024-05-23
Payer: MEDICARE

## 2024-05-23 DIAGNOSIS — M25.60 RANGE OF MOTION DEFICIT: ICD-10-CM

## 2024-05-23 DIAGNOSIS — R53.1 WEAKNESS: ICD-10-CM

## 2024-05-23 DIAGNOSIS — Z96.652 STATUS POST LEFT KNEE REPLACEMENT: Primary | ICD-10-CM

## 2024-05-23 DIAGNOSIS — M25.562 ACUTE PAIN OF LEFT KNEE: ICD-10-CM

## 2024-05-23 NOTE — PROGRESS NOTES
Physical Therapy Daily Treatment Note  [facility] [place]  [address], [address]  [place] [address]      Patient: Chava Santiago   : 1955  Referring practitioner: Rolando Woodruff MD  Date of Initial Visit: Type: THERAPY  Noted: 2024  Today's Date: 2024  Patient seen for 3 sessions       Visit Diagnoses:    ICD-10-CM ICD-9-CM   1. Status post left knee replacement  Z96.652 V43.65   2. Range of motion deficit  M25.60 719.50   3. Acute pain of left knee  M25.562 719.46   4. Weakness  R53.1 780.79       Subjective Evaluation    History of Present Illness  Mechanism of injury: The patient stated that his anterior knee has been painful in the last few days.  He has been trying to stretch more frequently.  He stated that he generally feels better after stretching and after manual therapy.    Pain  Current pain ratin  Location: Left knee           Objective          Active Range of Motion   Left Knee   Flexion: 91 degrees   Extension: 7 degrees     Additional Active Range of Motion Details   upon presentation    See Exercise, Manual, and Modality Logs for complete treatment.       Assessment & Plan       Assessment  Assessment details: The patient continues to report high levels of pain in the left knee, s/p left total knee arthroplasty.  Range of motion is limited significantly into extension, primarily by hamstring tightness and pain, and he will need to spend more time working on hamstring stretches and extension positioning throughout the day.  If he is able to improve his extension I anticipate that his gait will improve and overall tolerance to activity will increase.  He responded well to manual therapy in the clinic today and demonstrated improved range of motion into both flexion and extension along with a reduction in pain by the end of the visit.  Quad activation was fair today.    Plan  Plan details: Continue joint mobilization and stretching into flexion and  extension.  Emphasize extension range of motion.          Timed:         Manual Therapy:    15     mins  37205;     Therapeutic Exercise:    23     mins  77659;     Neuromuscular Evelio:    0    mins  17735;    Therapeutic Activity:     0     mins  09229;     Gait Trainin     mins  23777;     Ultrasound:     0     mins  25674;    Ionto                               0    mins   82385  Self Care                       0     mins   03481  Canalith Repos    0     mins 46811      Un-Timed:  Electrical Stimulation:    0     mins  63157 ( );  Dry Needling     0     mins self-pay  Traction     0     mins 44780      Timed Treatment:   38   mins   Total Treatment:     50   mins    Carter Anna PT  [place] License: [id number]

## 2024-05-29 ENCOUNTER — TREATMENT (OUTPATIENT)
Dept: PHYSICAL THERAPY | Facility: CLINIC | Age: 69
End: 2024-05-29
Payer: MEDICARE

## 2024-05-29 DIAGNOSIS — M25.562 ACUTE PAIN OF LEFT KNEE: ICD-10-CM

## 2024-05-29 DIAGNOSIS — M25.60 RANGE OF MOTION DEFICIT: ICD-10-CM

## 2024-05-29 DIAGNOSIS — Z96.652 STATUS POST LEFT KNEE REPLACEMENT: Primary | ICD-10-CM

## 2024-05-29 DIAGNOSIS — R53.1 WEAKNESS: ICD-10-CM

## 2024-05-29 NOTE — PROGRESS NOTES
Physical Therapy Daily Treatment Note  [facility] [place]  [address], [address]  [place] [address]      Patient: Chava Santiago   : 1955  Referring practitioner: Rolando Woodruff MD  Date of Initial Visit: Type: THERAPY  Noted: 2024  Today's Date: 2024  Patient seen for 4 sessions       Visit Diagnoses:    ICD-10-CM ICD-9-CM   1. Status post left knee replacement  Z96.652 V43.65   2. Acute pain of left knee  M25.562 719.46   3. Range of motion deficit  M25.60 719.50   4. Weakness  R53.1 780.79       Subjective Evaluation    History of Present Illness  Mechanism of injury: The patient stated that his knee felt stiff this morning.  He has not performed any stretches or used his bike yet.  He feels his pain has improved in the last week and he has been able to start weaning from his walker.  He continues to use the walker when he gets tired, otherwise, he is using a cane.  He believes his mobility is improving and stated he has been consistent with his stretches.    Pain  Current pain ratin  Location: L knee         Objective          Active Range of Motion   Left Knee   Flexion: 95 degrees   Extension: 5 degrees     See Exercise, Manual, and Modality Logs for complete treatment.       Assessment & Plan       Assessment  Assessment details: The patient was able to remove his postoperative bandaging over the weekend and the knee was visualized for the first time today.  He had scabbing over the incision with no signs of opening or infection.  Active range of motion was improved today, but he still has a way to go to reach functional limits.  Extension was nearly full with passive pressure but he did not tolerate this particularly well.  Joint mobilizations were tolerated better and grade 4 tibial mobilizations were performed for the first time.  He continues to assume approximately 25 degrees of knee flexion and resting positions, and was encouraged to work on active rest in  extension as much as possible.  His home program was modified to include a prone hang and active assisted heel slides.    Plan  Plan details: Continue manual therapy, stretching, and active range of motion exercises.        Timed:         Manual Therapy:    15     mins  81107;     Therapeutic Exercise:    15     mins  46999;     Neuromuscular Evelio:    8    mins  24695;    Therapeutic Activity:     0     mins  01427;     Gait Trainin     mins  95840;     Ultrasound:     0     mins  63127;    Ionto                               0    mins   86711  Self Care                       0     mins   23466  Canalith Repos    0     mins 40097      Un-Timed:  Electrical Stimulation:    0     mins  60394 ( );  Dry Needling     0     mins self-pay  Traction     0     mins 86342      Timed Treatment:   38   mins   Total Treatment:     50   mins    Carter Anna, PT  [place] License: [id number]

## 2024-05-31 ENCOUNTER — TREATMENT (OUTPATIENT)
Dept: PHYSICAL THERAPY | Facility: CLINIC | Age: 69
End: 2024-05-31
Payer: MEDICARE

## 2024-05-31 DIAGNOSIS — R53.1 WEAKNESS: ICD-10-CM

## 2024-05-31 DIAGNOSIS — M25.60 RANGE OF MOTION DEFICIT: ICD-10-CM

## 2024-05-31 DIAGNOSIS — M25.562 ACUTE PAIN OF LEFT KNEE: ICD-10-CM

## 2024-05-31 DIAGNOSIS — Z96.652 STATUS POST LEFT KNEE REPLACEMENT: Primary | ICD-10-CM

## 2024-05-31 NOTE — PROGRESS NOTES
Physical Therapy Daily Treatment Note  [facility] [place]  [address], [address]  [place] [address]      Patient: Chava Santiago   : 1955  Referring practitioner: Rolando Woodruff MD  Date of Initial Visit: Type: THERAPY  Noted: 2024  Today's Date: 2024  Patient seen for 5 sessions       Visit Diagnoses:    ICD-10-CM ICD-9-CM   1. Status post left knee replacement  Z96.652 V43.65   2. Acute pain of left knee  M25.562 719.46   3. Range of motion deficit  M25.60 719.50   4. Weakness  R53.1 780.79       Subjective Evaluation    History of Present Illness  Mechanism of injury: The patient stated that his knee was feeling stiff this morning but overall he feels his pain is continuing to decrease.  He felt that prone hangs were helpful and he has performed these intermittently throughout the day since his last visit.  He has been walking better with a cane.    Pain  Current pain rating: 3  Location: Left knee           Objective   See Exercise, Manual, and Modality Logs for complete treatment.       Assessment & Plan       Assessment  Assessment details: The patient demonstrated improved active range of motion compared to his last visit, and responded very well to manual therapy, achieving full passive extension after joint mobilization.  He continues to demonstrate high levels of muscle guarding, particularly in the hamstrings, and should respond well to ongoing hamstring stretching and prone hangs.  Quad activation was improved today and many exercises were moved to a standing position.  He was encouraged to start more frequent walking.    Plan  Plan details: Continue joint mobilization, range of motion exercises, and quad strengthening.          Timed:         Manual Therapy:    15     mins  89351;     Therapeutic Exercise:    23     mins  58563;     Neuromuscular Evelio:    0    mins  10061;    Therapeutic Activity:     0     mins  58540;     Gait Trainin     mins  19341;      Ultrasound:     0     mins  28907;    Ionto                               0    mins   13224  Self Care                       0     mins   37736  Canalith Repos    0     mins 86969      Un-Timed:  Electrical Stimulation:    0     mins  02703 ( );  Dry Needling     0     mins self-pay  Traction     0     mins 78917      Timed Treatment:   38   mins   Total Treatment:     70   mins    Carter Anna, PT  [place] License: [id number]

## 2024-06-01 DIAGNOSIS — I10 ESSENTIAL HYPERTENSION, BENIGN: ICD-10-CM

## 2024-06-03 ENCOUNTER — TREATMENT (OUTPATIENT)
Dept: PHYSICAL THERAPY | Facility: CLINIC | Age: 69
End: 2024-06-03
Payer: MEDICARE

## 2024-06-03 DIAGNOSIS — Z96.652 STATUS POST LEFT KNEE REPLACEMENT: Primary | ICD-10-CM

## 2024-06-03 DIAGNOSIS — R53.1 WEAKNESS: ICD-10-CM

## 2024-06-03 DIAGNOSIS — M25.60 RANGE OF MOTION DEFICIT: ICD-10-CM

## 2024-06-03 DIAGNOSIS — M25.562 ACUTE PAIN OF LEFT KNEE: ICD-10-CM

## 2024-06-03 RX ORDER — VALSARTAN 160 MG/1
160 TABLET ORAL DAILY
Qty: 90 TABLET | Refills: 3 | Status: SHIPPED | OUTPATIENT
Start: 2024-06-03

## 2024-06-03 NOTE — PROGRESS NOTES
Physical Therapy Daily Treatment Note  [facility]- [place]  [address], [address]  [place] [address]      Patient: Chava Santiago   : 1955  Referring practitioner: Rolando Woodruff MD  Date of Initial Visit: Type: THERAPY  Noted: 2024  Today's Date: 6/3/2024  Patient seen for 6 sessions       Visit Diagnoses:    ICD-10-CM ICD-9-CM   1. Status post left knee replacement  Z96.652 V43.65   2. Range of motion deficit  M25.60 719.50   3. Acute pain of left knee  M25.562 719.46   4. Weakness  R53.1 780.79       Subjective Evaluation    History of Present Illness  Mechanism of injury: The patient stated that his knee was feeling stiff today.  He has noticed his pain is worse in the evenings and at night.  He has been trying to walk frequently and is using the stairs about 4 times a day.  He was able to walk around his granddaughters graduation.    Pain  Current pain rating: 3  Location: L knee         Objective          Active Range of Motion   Left Knee   Flexion: 5 degrees   Extension: 100 degrees       See Exercise, Manual, and Modality Logs for complete treatment.       Assessment & Plan       Assessment  Assessment details: Left knee capsular mobility was improved today and end feel was more muscular into extension than it has been in previous visits.  He continues to have a significant amount of guarding with manual therapy and avoids the resting extension position while in the clinic.  Additionally, cocontraction of the hamstrings during quad setting is restricting motion and limiting quad activation.  Treatment was directed at restoring extension via hamstring stretching and quad activation and was tolerated well overall.  His tolerance to treatment has improved significantly in the last couple of weeks and he should continue to respond well to aggressive manual therapy and long-duration stretching.    Plan  Plan details: Continue knee mobilizations, stretching of the hamstrings, and  quad strengthening in TKE.          Timed:         Manual Therapy:    25     mins  87800;     Therapeutic Exercise:    13     mins  17308;     Neuromuscular Evelio:    0    mins  36912;    Therapeutic Activity:     0     mins  89109;     Gait Trainin     mins  65181;     Ultrasound:     0     mins  66104;    Ionto                               0    mins   43195  Self Care                       0     mins   72388  Canalith Repos    0     mins 20850      Un-Timed:  Electrical Stimulation:    0     mins  51942 ( );  Dry Needling     0     mins self-pay  Traction     0     mins 32089      Timed Treatment:   38   mins   Total Treatment:     65   mins    Carter Anna, PT  [place] License: [id number]

## 2024-06-04 ENCOUNTER — OFFICE VISIT (OUTPATIENT)
Dept: ORTHOPEDIC SURGERY | Facility: CLINIC | Age: 69
End: 2024-06-04
Payer: MEDICARE

## 2024-06-04 VITALS — TEMPERATURE: 97.1 F

## 2024-06-04 DIAGNOSIS — Z96.652 STATUS POST TOTAL LEFT KNEE REPLACEMENT: Primary | ICD-10-CM

## 2024-06-04 DIAGNOSIS — Z96.652 S/P TOTAL KNEE REPLACEMENT, LEFT: ICD-10-CM

## 2024-06-04 PROCEDURE — 1160F RVW MEDS BY RX/DR IN RCRD: CPT | Performed by: PHYSICIAN ASSISTANT

## 2024-06-04 PROCEDURE — 99024 POSTOP FOLLOW-UP VISIT: CPT | Performed by: PHYSICIAN ASSISTANT

## 2024-06-04 PROCEDURE — 1159F MED LIST DOCD IN RCRD: CPT | Performed by: PHYSICIAN ASSISTANT

## 2024-06-04 RX ORDER — ALLOPURINOL 300 MG/1
TABLET ORAL
COMMUNITY
Start: 2024-06-03

## 2024-06-04 RX ORDER — OXYCODONE HYDROCHLORIDE 5 MG/1
5 TABLET ORAL EVERY 8 HOURS PRN
Qty: 30 TABLET | Refills: 0 | Status: SHIPPED | OUTPATIENT
Start: 2024-06-04

## 2024-06-04 NOTE — PROGRESS NOTES
[facility]        Subjective     Post-op (3 weeks status post left total knee arthroplasty 05/05/2024)       SHAYNA Santiago is a 68 y.o. male. Patient presents for their initial postop visit following left TKA with Lauro robot performed on the above date by Dr. Woodruff.    Pain scale: 6/10. Associated symptoms pain, swelling. Pain with walking. REsting helps to ease the pain. Using cane to assist with ambulation. Attending OPPT.    Patient denies any fever, chills, night sweats or other constitutional symptoms.           Objective      Physical Exam  Temp 97.1 °F (36.2 °C)     There is no height or weight on file to calculate BMI.        Ortho Exam  Integument:   Left knee: Incision is healing well without redness, warmth, drainage or signs of infection.    Lower Extremities:   Left knee:    Tenderness:  Generalized pain typical following TKA    Effusion:  1+    Swelling: Positive with soft calf    Crepitus:  None    Range of motion:  Extension: 3°       Flexion: 110°  Instability:  No varus laxity, no valgus laxity, negative anterior drawer  Deformities:  None      Imaging Reviewed and Interpreted:  Ordered left knee plain films.  Imaging read/interpreted by Dr. Woodruff.    Imaging Results (Last 24 Hours)       Procedure Component Value Units Date/Time    XR Knee 3+ View With Kountze Left [[id number]] Resulted: 06/04/24 1455     Updated: 06/04/24 1456    Narrative:      Indication: Status post left total knee arthroplasty    Comparison: Todays xrays were compared to previous xrays from 5/15/2024      Impression:           Left Knee: Demonstrate well positioned knee arthroplasty components in   satisfactory alignment without evidence of wear, loosening, subsidence,   fracture, or osteolysis and No significant changes compared to prior   radiographs.              Assessment:  1. Status post total left knee replacement        Plan:  Status post left TKA with Lauro robot,  stable.  Reviewed imaging with patient.  Continue with outpatient PT.  Requested refill of narcotic pain medication, provided by Dr. Woodruff.  Continue with ASA as directed for DVT prophylaxis.  No dental procedures until minimum of 3 months out from surgery.  Patient will require indefinite antibiotic prophylaxis before dental procedures.  Follow up with Dr. Woodruff in 3 weeks for repeat evaluation. Needs knee imaging.   Questions and concerns answered.    Answers submitted by the patient for this visit:  Primary Reason for Visit (Submitted on 5/31/2024)  What is the primary reason for your visit?: Other  Other (Submitted on 5/31/2024)  Please describe your symptoms.: Follow up on knee replacement surgery  Have you had these symptoms before?: Yes  How long have you been having these symptoms?: Greater than 2 weeks      Gypsy Cote PA-C  06/04/24  19:44 EDT      Dictated Utilizing Dragon Dictation.

## 2024-06-05 ENCOUNTER — TREATMENT (OUTPATIENT)
Dept: PHYSICAL THERAPY | Facility: CLINIC | Age: 69
End: 2024-06-05
Payer: MEDICARE

## 2024-06-05 DIAGNOSIS — R53.1 WEAKNESS: ICD-10-CM

## 2024-06-05 DIAGNOSIS — M25.60 RANGE OF MOTION DEFICIT: ICD-10-CM

## 2024-06-05 DIAGNOSIS — Z96.652 STATUS POST LEFT KNEE REPLACEMENT: Primary | ICD-10-CM

## 2024-06-05 DIAGNOSIS — M25.562 ACUTE PAIN OF LEFT KNEE: ICD-10-CM

## 2024-06-05 NOTE — PROGRESS NOTES
"Physical Therapy Daily Treatment Note  [facility] [place]  [address], [address]  [place] [address]      Patient: Chava Santiago   : 1955  Referring practitioner: Rolando Woodruff MD  Date of Initial Visit: Type: THERAPY  Noted: 2024  Today's Date: 2024  Patient seen for 7 sessions       Visit Diagnoses:    ICD-10-CM ICD-9-CM   1. Status post left knee replacement  Z96.652 V43.65   2. Range of motion deficit  M25.60 719.50   3. Acute pain of left knee  M25.562 719.46   4. Weakness  R53.1 780.79       Subjective Evaluation    History of Present Illness  Mechanism of injury: Pt reports following HEP and reports fatigue following exercises. He saw his physician yesterday and was pleased to hear that his knee ROM was progressing as expected. Pt biked at home for 15 minutes before treatment to \"loosen it up\". Pt did not bring his single-point-cane to the session today and reported walking around his apartment last night and feeling safe walking without it. Pt says his knee feels a little more stiff today than during the previous session.    Pain  Current pain ratin  Location: L knee         Objective          Active Range of Motion   Left Knee   Flexion: 106 degrees   Extension: 4 degrees     Additional Active Range of Motion Details  Pre-treatment L Knee  Flexion - 105  Extension- 6      See Exercise, Manual, and Modality Logs for complete treatment.       Assessment & Plan       Assessment  Assessment details: Pt has slight edema in the L knee. Tightness in the posterior capsule of L knee continues to limit knee flexion along with pain and edema. Pt continued to have co-contraction of the quadriceps and hamstring limiting knee extension. Tightness addressed with anterior and posterior joint mobilizations on L knee along with PROM knee flexion and extension stretches in supine. Co-contraction of hamstring and quadriceps treated with standing isometric TKE. Pt tolerated standing " mini squats well  with pt reporting fatigue at 6 repetitions.     Plan  Plan details: Continue with joint mobilizations to gain knee flexion and extension mobility. Continue to strengthen quadriceps and gluteal muscles. Stretching of hamstrings.        Timed:         Manual Therapy:    23     mins  32709;     Therapeutic Exercise:    15     mins  14260;     Neuromuscular Evelio:    0    mins  49913;    Therapeutic Activity:     0     mins  65541;     Gait Trainin     mins  53392;     Ultrasound:     0     mins  02903;    Ionto                               0    mins   92482  Self Care                       0     mins   77549  Canalith Repos    0     mins 51182      Un-Timed:  Electrical Stimulation:    0     mins  63969 ( );  Dry Needling     0     mins self-pay  Traction     0     mins 89162      Timed Treatment:   38   mins   Total Treatment:     38   mins    Carter Anna PT  [place] License: [id number]

## 2024-06-11 ENCOUNTER — TREATMENT (OUTPATIENT)
Dept: PHYSICAL THERAPY | Facility: CLINIC | Age: 69
End: 2024-06-11
Payer: MEDICARE

## 2024-06-11 DIAGNOSIS — M25.562 ACUTE PAIN OF LEFT KNEE: Primary | ICD-10-CM

## 2024-06-11 DIAGNOSIS — R53.1 WEAKNESS: ICD-10-CM

## 2024-06-11 DIAGNOSIS — M25.60 RANGE OF MOTION DEFICIT: ICD-10-CM

## 2024-06-11 DIAGNOSIS — Z96.652 STATUS POST LEFT KNEE REPLACEMENT: ICD-10-CM

## 2024-06-11 NOTE — PROGRESS NOTES
Physical Therapy Daily Treatment Note  [facility] [place] [facility] [place]  [address], [address]  [place] [address]      Patient: Chava Santiago   : 1955  Referring practitioner: Rolando Woodruff MD  Date of Initial Visit: Type: THERAPY  Noted: 2024  Today's Date: 2024  Patient seen for 8 sessions       Visit Diagnoses:    ICD-10-CM ICD-9-CM   1. Acute pain of left knee  M25.562 719.46   2. Weakness  R53.1 780.79   3. Range of motion deficit  M25.60 719.50   4. Status post left knee replacement  Z96.652 V43.65       Subjective Evaluation    History of Present Illness  Mechanism of injury: Pt reports increased pain in the L sided lumbar back radiating into the L posterior and lateral hip starting on  after sleeping on his L back and hip. Pain is constant and inhibits his ability to sleep. Pt denies any tingling or burning. Pt reports that it feels better when laying on his back or right side. His back pain is limiting his ability to perform exercises indicated in his HEP. Pt reports that prior to  exercises were going well and no complaints of pain in the knee as well as being able to straighten knee to neutral. Pt is now using his single-point-cane for walking in the community but denies using his cane at home.     Pain  Current pain ratin  At best pain ratin  Location: L hip  Quality: dull ache  Relieving factors: rest and change in position           Objective          Tenderness     Additional Tenderness Details  Tenderness over glute medius muscle belly and insertion  Tenderness and reproduction of comparable sign over Piriformis     Active Range of Motion   Left Knee   Flexion: 108 degrees   Extension: 3 degrees     Additional Active Range of Motion Details  L knee Pre-treatment  Flexion- 107 degrees  Ext- 9 degrees    L knee post-treatment  Flexion- 108 degrees  Ext- 3 degrees    Strength/Myotome Testing     Additional Strength Details  Pain with resisted external  rotation       See Exercise, Manual, and Modality Logs for complete treatment.       Assessment & Plan       Assessment  Assessment details: Pt presents to treatment today with L lumbar and lateral hip pain that limited ability to perform exercises aimed at restoring ROM and strength of the L knee. Pt instructed in figure 4 stretch to the L piriformis and glute medius that was found to be tender to palpation. Eccentric clamshell used to strengthen the glute med in a pain-free ROM to restore function and was added to his home exercise program.  Active assisted heel slides were removed from his HEP due to discomfort at the hip, but will likely be added back and as hip pain decreases.  Pt anterior and posterior grade 3 and 4 L knee joint mobilizations performed to restore knee extension and flexion ROM. PNF contract-relax of hamstring used to treat pt cocontraction of the hamstrings and quadriceps, but is still limiting knee extension range of motion.    Plan  Plan details: Monitor L back and hip pain progression. Add knee flexion stretches back to HEP when L back and Hip pain resolved. Continue to progress joint mobilizations, strengthen quad, and stretch hamstring.           Timed:         Manual Therapy:    25    mins  08664;     Therapeutic Exercise:    15     mins  06059;     Neuromuscular Evelio:    0    mins  16859;    Therapeutic Activity:     0     mins  57894;     Gait Trainin     mins  12213;     Ultrasound:     0     mins  55055;    Ionto                               0    mins   70740  Self Care                       0     mins   40283  Canalith Repos    0     mins 49166      Un-Timed:  Electrical Stimulation:    0     mins  42571 ( );  Dry Needling     0     mins self-pay  Traction     0     mins 88304      Timed Treatment:   40   mins   Total Treatment:     65   mins    Carter Anna PT  [place] License: [id number]    Denis Oseguera, Physical Therapist Student, completed treatment under my direct  supervision.     06/11/2024

## 2024-06-13 ENCOUNTER — TREATMENT (OUTPATIENT)
Dept: PHYSICAL THERAPY | Facility: CLINIC | Age: 69
End: 2024-06-13
Payer: MEDICARE

## 2024-06-13 DIAGNOSIS — R53.1 WEAKNESS: ICD-10-CM

## 2024-06-13 DIAGNOSIS — Z96.652 STATUS POST LEFT KNEE REPLACEMENT: ICD-10-CM

## 2024-06-13 DIAGNOSIS — M25.562 ACUTE PAIN OF LEFT KNEE: Primary | ICD-10-CM

## 2024-06-13 NOTE — PROGRESS NOTES
Physical Therapy Daily Treatment Note  [facility] [place]  [address], [address]  [place] [address]      Patient: Chava Santiago   : 1955  Referring practitioner: Rolando Woodruff MD  Date of Initial Visit: Type: THERAPY  Noted: 2024  Today's Date: 2024  Patient seen for 9 sessions       Visit Diagnoses:  No diagnosis found.    Subjective Evaluation    History of Present Illness  Mechanism of injury: Pt reports that L hip and back pain felt much better following treatment on Tuesday. Pt did his HEP on Wednesday and his L hip started to bother him a little bit, but pain resolved quickly. Today, the pt is not using his single-point-cane. Pt feels a little tightness in his L knee. He reports that the walk up the hill to his apartment flares up his L knee pain.     Pain  Current pain rating: 3  At best pain rating: 3  At worst pain ratin  Location: L knee           Objective          Active Range of Motion     Additional Active Range of Motion Details  Pre-treatment L knee  Flex - 104 degrees  Ext - 8 degrees  Post-Treatment L Knee  Flex - 111 degrees  Ext - 3 degrees        See Exercise, Manual, and Modality Logs for complete treatment.       Assessment & Plan       Assessment  Assessment details: Pt knee flexion limited by quadriceps tightness and posterior capsule tightness and is being addressed by AP joint mobilizations and sidelying quad stretch. Pt L knee extension ROM continues to be limited by cocontraction of hamstring and quadriceps as well as tightness of the gastroc. Pt has decreased stance time on L leg and difficulty with motor control of quad. Pt will benefit from further gait training with emphasis on terminal stance phase into heel off. Calf stretching initiated to address limitations of muscle ROM affecting L knee extension and to allow for normalized gait mechanics. Pt had a small loss of balance when his L knee buckled due to fatigue during gait following an  exercise, but was able to catch his balance. Overall, pt has decreased pain during this session and is improving strength of the quad as evidenced by increased exercise tolerance.     Plan  Plan details: Continue stretching of quadriceps, plantarflexors, and hamstring. Continue strengthening of quad. Progress gait training into functional exercises. Progress HEP as appropriate.           Timed:         Manual Therapy:    15     mins  59024;     Therapeutic Exercise:    15     mins  98129;     Neuromuscular Evelio:    0    mins  46733;    Therapeutic Activity:     15     mins  78034;     Gait Trainin     mins  07622;     Ultrasound:     0     mins  70023;    Ionto                               0    mins   07789  Self Care                       0     mins   43924  Canalith Repos    0     mins 11071      Un-Timed:  Electrical Stimulation:    0     mins  37850 ( );  Dry Needling     0     mins self-pay  Traction     0     mins 48081      Timed Treatment:   53   mins   Total Treatment:     65   mins    Carter Anna PT  [place] License: [id number]    Denis Oseguera, Physical Therapist Student, completed treatment under my direct supervision.     2024

## 2024-06-18 ENCOUNTER — TREATMENT (OUTPATIENT)
Dept: PHYSICAL THERAPY | Facility: CLINIC | Age: 69
End: 2024-06-18
Payer: MEDICARE

## 2024-06-18 DIAGNOSIS — M25.562 ACUTE PAIN OF LEFT KNEE: ICD-10-CM

## 2024-06-18 DIAGNOSIS — M25.60 RANGE OF MOTION DEFICIT: ICD-10-CM

## 2024-06-18 DIAGNOSIS — R53.1 WEAKNESS: ICD-10-CM

## 2024-06-18 DIAGNOSIS — Z96.652 STATUS POST LEFT KNEE REPLACEMENT: Primary | ICD-10-CM

## 2024-06-18 NOTE — PROGRESS NOTES
Physical Therapy Daily Treatment Note  [facility] [place] [facility] [place]  [address], [address]  [place] [address]      Patient: Chava Santiago   : 1955  Referring practitioner: Rolando Woodruff MD  Date of Initial Visit: No linked episodes  Today's Date: 2024  Patient seen for Visit count could not be calculated. Make sure you are using a visit which is associated with an episode. sessions       Visit Diagnoses:  No diagnosis found.    Subjective Evaluation    History of Present Illness  Mechanism of injury: Pt reports feeling much better following the last treatment session regarding swelling and range of motion and feels the HEP is in a good place. Yesterday, he cleaned his apartment including vacuuming and dusting which flared up his L knee pain. He reports that it feels tight today. He still has difficulty standing for extended periods of time without increased pain. Pt reports that L hip pain has gotten much better, but he still has some small amounts of pain in the L sided low back.     Pain  Current pain ratin  At worst pain ratin  Location: L knee  Quality: dull ache         Objective          Active Range of Motion     Additional Active Range of Motion Details  Pre-treatment L knee  Flex - 109 degrees  Ext - 9 degrees  Post-treatment   Flex - 111 degrees  Ext - 4 degrees      See Exercise, Manual, and Modality Logs for complete treatment.       Assessment & Plan       Assessment  Assessment details: Pt anterior capsule mobility continues to improve and is no longer the primary cause for ROM deficit into knee extension. Co-contraction of the hamstrings and quadriceps limiting knee extension ROM apparent with palpation and and during prone TKE. Co-contraction addressed by stretching of the hamstrings and TKE exercises in standing. Pt knee flexion ROM limited by posterior capsule tightness and quadriceps muscle length deficit. Pt has had total relief of L hip pain due to stretching of  the iliopsoas and piriformis. Pt has returned to performing ADLs in his home such as cooking and cleaning, but still struggles with standing endurance. Pt educated to  monitor signs of fatigue to avoid excessive soreness post activity.     Plan  Plan details: Stretching of the hamstrings and quadriceps to address muscle length deficits and L knee ROM deficit. Progress HEP as indicated. Possibly progress to add cardiovascular endurance activities.           Timed:         Manual Therapy:    15     mins  78010;     Therapeutic Exercise:    15     mins  14861;     Neuromuscular Evelio:    8    mins  91142;    Therapeutic Activity:     0     mins  70153;     Gait Trainin     mins  35296;     Ultrasound:     0     mins  85221;    Ionto                               0    mins   25721  Self Care                       0     mins   75139  Canalith Repos    0     mins 29058      Un-Timed:  Electrical Stimulation:    0     mins  24865 ( );  Dry Needling     0     mins self-pay  Traction     0     mins 23453      Timed Treatment:   38   mins   Total Treatment:     60   mins    Carter Anna PT  [place] License: [id number]    Denis Oseguera, Physical Therapist Student, completed treatment under my direct supervision.     2024

## 2024-06-20 ENCOUNTER — TREATMENT (OUTPATIENT)
Dept: PHYSICAL THERAPY | Facility: CLINIC | Age: 69
End: 2024-06-20
Payer: MEDICARE

## 2024-06-20 DIAGNOSIS — R53.1 WEAKNESS: ICD-10-CM

## 2024-06-20 DIAGNOSIS — M25.562 ACUTE PAIN OF LEFT KNEE: ICD-10-CM

## 2024-06-20 DIAGNOSIS — Z96.652 STATUS POST LEFT KNEE REPLACEMENT: Primary | ICD-10-CM

## 2024-06-20 DIAGNOSIS — M25.60 RANGE OF MOTION DEFICIT: ICD-10-CM

## 2024-06-20 NOTE — PROGRESS NOTES
Physical Therapy Daily Treatment Note  [facility] [place] [facility] [place]  [address], [address]  [place] [address]      Patient: Chava Santiago   : 1955  Referring practitioner: Rolando Woodruff MD  Date of Initial Visit: No linked episodes  Today's Date: 2024  Patient seen for Visit count could not be calculated. Make sure you are using a visit which is associated with an episode. sessions       Visit Diagnoses:  No diagnosis found.    Subjective Evaluation    History of Present Illness  Mechanism of injury: Pt reports that pain in his knee and back have subsided and he feels relief after doing his exercises. Pt has been walking his dog for 20-30 minutes twice a day and it is getting easier to walk for that period of time. Pt feels his HEP is the right amount of difficulty. Pt has not been using his cane when walking.    Pain  Current pain ratin  At worst pain ratin  Location: L Knee           Objective          Active Range of Motion     Additional Active Range of Motion Details  Pre-treatment L Knee  Flex - 108 degrees  Ext - 4 degrees   Post- treatment L Knee  Flex - 113 degrees   Ext - 3 degrees       See Exercise, Manual, and Modality Logs for complete treatment.       Assessment & Plan       Assessment  Assessment details: Pt has tightness of the posterior joint capsule that limits posterior glide during L knee flexion and is limiting total motion, addressed by grade 3/4 AP glide. PA glide of L knee moves much more freely and has better motion today than previous treatment sessions. L knee extension limited by cocontraction of the hamstrings and quadriceps as well as hamstring tightness. Hamstring stretching has been progressed to be more aggressive with a seated hamstring stretch with L heel on a elevated surface and an anterior tilt of the pelvis to direct the stretch to both the origin and insertion. TKE w/ ball to address cocontraction of the knee flexors and extensors. Quadriceps  activation palpated at the vastus medialis oblique is robust during TKE exercises. Pt strengthening of the glute medius progressed to add functional movement with a lateral band walk with red theraband around distal thigh. Pt had difficulty with eccentric control of B hip abductors when bringing the LE together during lateral walk. Antalgic gait persists due to abnormal motor control during terminal stance phase of the L LE, but is improving with continued knee throw exercises. HEP progressed at the end of today's treatment. Overall, pt is progressing well in terms of both exercise tolerance and range of motion.     Plan  Plan details: Continue to address cocontraction of hamstring and quadriceps. Stretching of the hamstring. Continue with grade 3/4 AP joint mobilizations and focus primarily on PA joint mobilizations to restore flexion. Progress HEP as indicated.          Timed:         Manual Therapy:    15     mins  18140;     Therapeutic Exercise:    15     mins  67767;     Neuromuscular Evelio:    8    mins  93895;    Therapeutic Activity:     0     mins  35663;     Gait Trainin     mins  31178;     Ultrasound:     0     mins  02244;    Ionto                               0    mins   94327  Self Care                       0     mins   31806  Canalith Repos    0     mins 92993      Un-Timed:  Electrical Stimulation:    0     mins  70741 ( );  Dry Needling     0     mins self-pay  Traction     0     mins 85212      Timed Treatment:   38   mins   Total Treatment:     60   mins    Carter Anna PT  [place] License: [id number]    Denis Oseguera, Physical Therapist Student, completed treatment under my direct supervision.     2024

## 2024-06-25 ENCOUNTER — TREATMENT (OUTPATIENT)
Dept: PHYSICAL THERAPY | Facility: CLINIC | Age: 69
End: 2024-06-25
Payer: MEDICARE

## 2024-06-25 ENCOUNTER — OFFICE VISIT (OUTPATIENT)
Dept: ORTHOPEDIC SURGERY | Facility: CLINIC | Age: 69
End: 2024-06-25
Payer: MEDICARE

## 2024-06-25 DIAGNOSIS — M25.60 RANGE OF MOTION DEFICIT: ICD-10-CM

## 2024-06-25 DIAGNOSIS — R53.1 WEAKNESS: ICD-10-CM

## 2024-06-25 DIAGNOSIS — M25.562 ACUTE PAIN OF LEFT KNEE: ICD-10-CM

## 2024-06-25 DIAGNOSIS — Z96.652 STATUS POST LEFT KNEE REPLACEMENT: Primary | ICD-10-CM

## 2024-06-25 DIAGNOSIS — Z96.652 S/P TOTAL KNEE REPLACEMENT, LEFT: Primary | ICD-10-CM

## 2024-06-25 PROCEDURE — 1160F RVW MEDS BY RX/DR IN RCRD: CPT | Performed by: ORTHOPAEDIC SURGERY

## 2024-06-25 PROCEDURE — 1159F MED LIST DOCD IN RCRD: CPT | Performed by: ORTHOPAEDIC SURGERY

## 2024-06-25 PROCEDURE — 99024 POSTOP FOLLOW-UP VISIT: CPT | Performed by: ORTHOPAEDIC SURGERY

## 2024-06-25 RX ORDER — OXYCODONE HYDROCHLORIDE 5 MG/1
5 TABLET ORAL
Qty: 20 TABLET | Refills: 0 | Status: SHIPPED | OUTPATIENT
Start: 2024-06-25

## 2024-06-25 NOTE — PROGRESS NOTES
Orthopaedic Clinic Note:  Knee Post Op    Chief Complaint   Patient presents with    Follow-up     3 week f/u -- 6 weeks status post left total knee arthroplasty (DOS 05/15/2024)        HPI    Mr. Santiago is 6  week(s) s/p left total knee arthroplasty. Rates pain 4/10. He is ambulating with no assistive device and is taking nothing for pain control. He denies fevers, chills, or constitutional symptoms.  He is continuing outpatient PT. Patient is improving overall.  He denies complications.  He is happy with his progress..    I have reviewed the following portions of the patient's history:History of Present Illness    Past Medical History:   Diagnosis Date    Acute bronchitis     Acute pharyngitis     Conjunctivitis     CTS (carpal tunnel syndrome)     Gout     Hyperlipidemia     Hypertension     IBS (irritable bowel syndrome)     Knee swelling     Lumbago     Rhinitis     Tear of meniscus of knee       Past Surgical History:   Procedure Laterality Date    COLONOSCOPY      EYE SURGERY Bilateral     multiple on both eyes, RIGHT EYE DETACHED RETINA, LEFT EYE HAD EYELID SURGERY    HAND SURGERY  3/11/2022    BILATERAL CARPA TUNNEL RELEASE    HERNIA REPAIR Right     x2 INGUINAL    KNEE SURGERY Left     arthroscopy with meniscal repair- 10 years ago    TOTAL KNEE ARTHROPLASTY Left 5/15/2024    Procedure: TOTAL KNEE ARTHROPLASTY WITH VINNIE ROBOT - LEFT;  Surgeon: Rolando Woodruff MD;  Location: [facility];  Service: Robotics - Ortho;  Laterality: Left;    VASECTOMY      WRIST SURGERY Left     CTR 3/11/22     Family History   Adopted: Yes      Social History     Socioeconomic History    Marital status:    Tobacco Use    Smoking status: Former     Current packs/day: 0.00     Types: Cigarettes     Quit date: 1983     Years since quittin.5    Smokeless tobacco: Never   Vaping Use    Vaping status: Never Used   Substance and Sexual Activity    Alcohol use: Not Currently     Comment: occ    Drug use: No    Sexual  activity: Not Currently     Partners: Female      Current Outpatient Medications on File Prior to Visit   Medication Sig Dispense Refill    acetaminophen (TYLENOL) 500 MG tablet Take 2 tablets by mouth Every 8 (Eight) Hours. 42 tablet 0    allopurinol (ZYLOPRIM) 300 MG tablet       aspirin (ASPIR) 81 MG EC tablet Take 1 tablet by mouth 2 (Two) Times a Day. 60 tablet 0    colestipol (COLESTID) 1 g tablet TAKE ONE TABLET BY MOUTH TWICE A DAY AS NEEDED FOR DIARRHEA 180 tablet 1    dicyclomine (BENTYL) 20 MG tablet TAKE 1 TABLET BY MOUTH 4 TIMES A DAY AS NEEDED FOR ABDOMINAL PAIN / DIARRHEA 60 tablet 5    docusate sodium (Colace) 100 MG capsule Take 1 capsule by mouth 2 (Two) Times a Day As Needed for Constipation. 62 capsule 0    hydroCHLOROthiazide (HYDRODIURIL) 25 MG tablet TAKE ONE TABLET BY MOUTH DAILY 90 tablet 3    nabumetone (RELAFEN) 750 MG tablet TAKE 1 TABLET BY MOUTH TWICE A DAY 60 tablet 1    naloxone (NARCAN) 4 MG/0.1ML nasal spray Call 911. Don't prime. Spray in 1 nostril as needed for overdose. Repeat in 2-3 minutes in other nostril if no or minimal breathing/responsiveness. 2 each 0    omeprazole (priLOSEC) 40 MG capsule TAKE ONE CAPSULE BY MOUTH DAILY 30 capsule 11    ondansetron (Zofran) 4 MG tablet Take 1 tablet by mouth Every 8 (Eight) Hours As Needed for Nausea or Vomiting. 10 tablet 1    oxyCODONE (ROXICODONE) 5 MG immediate release tablet Take 1 tablet by mouth Every 8 (Eight) Hours As Needed for Severe Pain. 30 tablet 0    pravastatin (PRAVACHOL) 40 MG tablet TAKE ONE TABLET BY MOUTH DAILY 90 tablet 3    prednisoLONE acetate (PRED FORTE) 1 % ophthalmic suspension       ropivacaine (NAROPIN) 0.2 % infusion (INFUSYSTEM) 2 mg/hr by Peripheral Nerve route Continuous.      sucralfate (Carafate) 1 GM/10ML suspension Take 10 mL by mouth 4 (Four) Times a Day As Needed (ulcer pain). 414 mL 11    tamsulosin (FLOMAX) 0.4 MG capsule 24 hr capsule Take 1 capsule by mouth Daily. 10 capsule 0    valsartan  (DIOVAN) 160 MG tablet TAKE 1 TABLET BY MOUTH DAILY 90 tablet 3     No current facility-administered medications on file prior to visit.      No Known Allergies     Review of Systems   Constitutional: Negative.    HENT: Negative.     Eyes: Negative.    Respiratory: Negative.     Cardiovascular: Negative.    Gastrointestinal: Negative.    Endocrine: Negative.    Genitourinary: Negative.    Musculoskeletal:  Positive for arthralgias.   Skin: Negative.    Allergic/Immunologic: Negative.    Neurological: Negative.    Hematological: Negative.    Psychiatric/Behavioral: Negative.          Physical Exam  There were no vitals taken for this visit.    There is no height or weight on file to calculate BMI.    GENERAL APPEARANCE: awake, alert, oriented, in no acute distress and well developed, well nourished  LUNGS:  breathing nonlabored  EXTREMITIES: no clubbing, cyanosis  PERIPHERAL PULSES: palpable dorsalis pedis and posterior tibial pulses bilaterally.    GAIT:  Normal          Left Knee Exam:  ----------  ALIGNMENT: neutral  ----------  RANGE OF MOTION:  Decreased (3 - 115 degrees) with no extensor lag  LIGAMENTOUS STABILITY:   stable to varus and valgus stress at terminal extension and 30 degrees without any evidence of laxity  ----------  STRENGTH:  KNEE FLEXION 5/5  KNEE EXTENSION  5/5  ANKLE DORSIFLEXION  5/5  ANKLE PLANTARFLEXION  5/5  ----------  PAIN WITH PALPATION:denies tenderness to palpation about the knee  KNEE EFFUSION: yes, trace effusion  PAIN WITH KNEE ROM: no  PATELLAR CREPITUS:  no  ----------  SENSATION TO LIGHT TOUCH:  DEEP PERONEAL/SUPERFICIAL PERONEAL/SURAL/SAPHENOUS/TIBIAL:    intact  ----------  EDEMA:  no  ERYTHEMA:    no  WOUNDS/INCISIONS:   yes, well healed surgical incision without evidence of erythema or drainage  _____________________________________________________________________  _____________________________________________________________________    RADIOGRAPHIC FINDINGS:   Indication:  Status post left total knee arthroplasty    Comparison: Todays xrays were compared to previous xrays from 6/4/2024    Knee films: Demonstrate well positioned knee arthroplasty components in satisfactory alignment without evidence of wear, loosening, subsidence, fracture, or osteolysis and No significant changes compared to prior radiographs.       Assessment/Plan:   Diagnosis Plan   1. S/P total knee replacement, left  XR Knee 3+ View With [place] Left        Patient did well 6 weeks status post left total knee arthroplasty.  I encouraged him to continue working on terminal flexion extension to achieve full range of motion.  Continue gait training and strengthening.  I will refill his oxycodone 1 last time.  He will return to work full duty on 7/15/2024 without restrictions.  I will see him back in 2 months for repeat assessment x-ray 3 views left knee on return.    Rolando Woodruff MD  06/25/24  14:37 EDT

## 2024-06-25 NOTE — PROGRESS NOTES
Physical Therapy Daily Treatment Note  [facility] [place]  [address], [address]  [place] [address]      Patient: Chava Santiago   : 1955  Referring practitioner: Rolando Woodruff MD  Date of Initial Visit: Type: THERAPY  Noted: 2024  Today's Date: 2024  Patient seen for 12 sessions       Visit Diagnoses:    ICD-10-CM ICD-9-CM   1. Status post left knee replacement  Z96.652 V43.65   2. Weakness  R53.1 780.79   3. Range of motion deficit  M25.60 719.50   4. Acute pain of left knee  M25.562 719.46       Subjective Evaluation    History of Present Illness  Mechanism of injury: Pt reports having a very busy weekend and feels he has overdone things. His back is slightly tender and reports tightness and pain in his posterior knee. He was sitting on the side of the pool kicking and walked in flip flops when his knee pain started.   Pt will see Dr. Woodruff this afternoon for his 6-week follow-up.    Pain  Current pain ratin  At worst pain ratin  Location: L knee  Quality: dull ache           Objective          Active Range of Motion     Additional Active Range of Motion Details  Treatment L Knee  Flex - 109 degrees  Ext - 3 degrees        See Exercise, Manual, and Modality Logs for complete treatment.       Assessment & Plan       Assessment  Assessment details: Pt L knee joint capsule mobility continues to improve with posterior capsule mobility being slightly limited. ROM deficits into knee extension limited by hamstring cocontraction and flexibility. ROM deficit into knee flexion limited by quadriceps tightness and pain inhibition. Pt ambulated with a more normalized gait pattern with R stance phase and heel off improving due to continued knee throwing exercise. Knee throw progressed to add a step through to add more functional movement. Pt will continue to benefit from joint mobilizations, stretching of the quad and hamstring, and strengthening of the hip and knee to restore  function and reduce pain.     Plan  Plan details: Continue L knee joint mobilizations. Continue strengthening of quadriceps and hip abductors. Progress functional exercises, perhaps with a higher resistance band continue stretching of the quad and hamstring. Progress HEP as indicated.           Timed:         Manual Therapy:    8     mins  53583;     Therapeutic Exercise:    0     mins  65502;     Neuromuscular Evelio:    0    mins  12313;    Therapeutic Activity:     15     mins  56097;     Gait Trainin     mins  58003;     Ultrasound:     0     mins  36260;    Ionto                               0    mins   48446  Self Care                       0     mins   85773  Canalith Repos    0     mins 39856      Un-Timed:  Electrical Stimulation:    0     mins  88815 ( );  Dry Needling     0     mins self-pay  Traction     0     mins 41075      Timed Treatment:   23   mins   Total Treatment:     60   mins    Carter Anna PT  [place] License: [id number]    Denis Oseguera, Physical Therapist Student, completed treatment under my direct supervision.     2024

## 2024-06-27 ENCOUNTER — TREATMENT (OUTPATIENT)
Dept: PHYSICAL THERAPY | Facility: CLINIC | Age: 69
End: 2024-06-27
Payer: MEDICARE

## 2024-06-27 DIAGNOSIS — R53.1 WEAKNESS: ICD-10-CM

## 2024-06-27 DIAGNOSIS — Z96.652 STATUS POST LEFT KNEE REPLACEMENT: Primary | ICD-10-CM

## 2024-06-27 DIAGNOSIS — M25.562 ACUTE PAIN OF LEFT KNEE: ICD-10-CM

## 2024-06-27 DIAGNOSIS — M25.60 RANGE OF MOTION DEFICIT: ICD-10-CM

## 2024-06-27 NOTE — PROGRESS NOTES
Physical Therapy Daily Treatment Note  [facility]- [place]  [address], [address]  [place] [address]      Patient: Chava Santiago   : 1955  Referring practitioner: Rolando Woodruff MD  Date of Initial Visit: Type: THERAPY  Noted: 2024  Today's Date: 2024  Patient seen for 13 sessions       Visit Diagnoses:    ICD-10-CM ICD-9-CM   1. Status post left knee replacement  Z96.652 V43.65   2. Weakness  R53.1 780.79   3. Range of motion deficit  M25.60 719.50   4. Acute pain of left knee  M25.562 719.46       Subjective Evaluation    History of Present Illness  Mechanism of injury: Pt had an appointment with Dr. Woodruff yesterday and he was cleared to return to work on the . 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 Pt will not be walking up and down stairs. Pt has a lot of soreness in his L knee in the morning, but it resolves with motion. Pt is having a slight flare up of pain in his L hip that he believes is due to the prone hip flexor stretch.     Pain  Current pain rating: 3  At worst pain ratin  Location: L knee; L hip 4/10         Objective          Active Range of Motion     Additional Active Range of Motion Details  Pre-Treatment L knee  Flex - 113 degrees  Ext - 3 degrees   Post-treatment L knee  Flex - 117 degrees  Ext - 1 degree    See Exercise, Manual, and Modality Logs for complete treatment.       Assessment & Plan       Assessment  Assessment details: Pt L knee anterior and posterior joint capsule mobility continues to feel better durig AP and PA glides of the tibia on the femur. Pt has a slight trendelenburg gait pattern due to weakness in the B hip abductors with L weaker than R. Pt was unable to perform L single leg stance for greater than 5 seconds without use of the UE for balance due to weakness of the glute med and poor ankle/hip strategy to maintain balance. Multiple exercises  progressed this session to add glute med component with intent to improve balance and gait mechanics. Pt still has cocontraction of L quadriceps and hamstring during quad setting exercises. Pt HEP progressed to add additional glute med exercise.    Plan  Plan details:  Continue joint mobilizations to restore L knee ext and flexion. Continue functional exercises for glute med and quad strengthening. Progress HEP as indicated       Timed:         Manual Therapy:    8     mins  19097;     Therapeutic Exercise:    15     mins  37756;     Neuromuscular Evelio:    0    mins  66722;    Therapeutic Activity:     0     mins  41157;     Gait Trainin     mins  38217;     Ultrasound:     0     mins  00383;    Ionto                               0    mins   94089  Self Care                       0     mins   73076  Canalith Repos    0     mins 54201      Un-Timed:  Electrical Stimulation:    0     mins  66042 ( );  Dry Needling     0     mins self-pay  Traction     0     mins 74325      Timed Treatment:   23   mins   Total Treatment:     60   mins    MAURICIO Branch License: [id number]

## 2024-07-02 ENCOUNTER — TREATMENT (OUTPATIENT)
Dept: PHYSICAL THERAPY | Facility: CLINIC | Age: 69
End: 2024-07-02
Payer: MEDICARE

## 2024-07-02 DIAGNOSIS — R53.1 WEAKNESS: ICD-10-CM

## 2024-07-02 DIAGNOSIS — M25.60 RANGE OF MOTION DEFICIT: ICD-10-CM

## 2024-07-02 DIAGNOSIS — Z96.652 STATUS POST LEFT KNEE REPLACEMENT: Primary | ICD-10-CM

## 2024-07-02 DIAGNOSIS — M25.562 ACUTE PAIN OF LEFT KNEE: ICD-10-CM

## 2024-07-02 NOTE — PROGRESS NOTES
Physical Therapy Daily Treatment Note  [facility] [place] [facility]- [place]  [address], [address]  [place] [address]      Patient: Chava Santiago   : 1955  Referring practitioner: Rolando Woodruff MD  Date of Initial Visit: Type: THERAPY  Noted: 2024  Today's Date: 2024  Patient seen for 14 sessions       Visit Diagnoses:    ICD-10-CM ICD-9-CM   1. Status post left knee replacement  Z96.652 V43.65   2. Weakness  R53.1 780.79   3. Range of motion deficit  M25.60 719.50   4. Acute pain of left knee  M25.562 719.46       Subjective Evaluation    History of Present Illness  Mechanism of injury: Pt reports that he has been feeling good this week since his last treatment. He still feels some tightness in the morning that starts to feel better as he starts moving. He takes a walk every morning and feels that it helps with the knee pain. He does not have moments of 0/10 pain, but feels that he is close.     Pain  Current pain ratin  At best pain ratin  At worst pain ratin  Location: L knee  Quality: tight         Objective          Active Range of Motion     Additional Active Range of Motion Details  Pre-treatment L knee   Flex - 111 degrees    Ext - 3 degrees     Post-treatment   Flex - 118 degrees   Ext - 1 degree      See Exercise, Manual, and Modality Logs for complete treatment.       Assessment & Plan       Assessment  Assessment details: Pt ambulated with a trendelenburg pattern into the treatment session with L trunk lean during L stance phase. Pt still lacking joint capsule mobility posteriorly and anteriorly that is limiting his ability to reach full extension. Tightness of the hamstring along with cocontraction of the quadriceps and hamstring further limit knee extension. Knee flexion ROM limited primarily by pain and tightness of the quadriceps. Strengthening of the hip abductors progressed today to add greater resistance in functional positions to address gait impairment. TG deep  squat, single leg stance, and TKE with foot on a stool exercises aimed at increasing strength at end range flexion/extension. Quad activation during TKE improves substantially when pt visualizes the quad mell directly. Pt will continue to improve AROM with continued exercise into end range flexion/extension.     Plan  Plan details: Continue exercises into terminal knee extension and strengthening in end range knee flexion. Continue joint mobilizations and stretches into flexion and extension. Progress HEP as indicated. Possibly add TKE with foot on a chair to HEP.          Timed:         Manual Therapy:    15     mins  36714;     Therapeutic Exercise:    15     mins  75208;     Neuromuscular Evelio:    0    mins  07459;    Therapeutic Activity:     0     mins  55677;     Gait Trainin     mins  27643;     Ultrasound:     0     mins  03151;    Ionto                               0    mins   89672  Self Care                       0     mins   52130  Canalith Repos    0     mins 35897      Un-Timed:  Electrical Stimulation:    0     mins  57156 ( );  Dry Needling     0     mins self-pay  Traction     0     mins 21738      Timed Treatment:   30   mins   Total Treatment:     60   mins    Carter Anna PT  [place] License: [id number]    Denis Oseguera, Physical Therapist Student, completed treatment under my direct supervision.     2024

## 2024-07-09 ENCOUNTER — TREATMENT (OUTPATIENT)
Dept: PHYSICAL THERAPY | Facility: CLINIC | Age: 69
End: 2024-07-09
Payer: MEDICARE

## 2024-07-09 DIAGNOSIS — M25.562 ACUTE PAIN OF LEFT KNEE: ICD-10-CM

## 2024-07-09 DIAGNOSIS — Z96.652 STATUS POST LEFT KNEE REPLACEMENT: Primary | ICD-10-CM

## 2024-07-09 DIAGNOSIS — M25.60 RANGE OF MOTION DEFICIT: ICD-10-CM

## 2024-07-09 DIAGNOSIS — R53.1 WEAKNESS: ICD-10-CM

## 2024-07-09 NOTE — PROGRESS NOTES
Physical Therapy Daily Treatment Note  [facility] [place] [place] [facility]- [place]  [address], [address]  [place] [address]      Patient: Chava Santiago   : 1955  Referring practitioner: Rolando Woodruff MD  Date of Initial Visit: Type: THERAPY  Noted: 2024  Today's Date: 2024  Patient seen for 15 sessions       Visit Diagnoses:    ICD-10-CM ICD-9-CM   1. Status post left knee replacement  Z96.652 V43.65   2. Weakness  R53.1 780.79   3. Range of motion deficit  M25.60 719.50   4. Acute pain of left knee  M25.562 719.46       Subjective Evaluation    History of Present Illness  Mechanism of injury: Pt reports that he has continued to walk many times a day with his dog and feels that that does not increase pain in his L knee. Pt has about 3/10 pain in the morning that feels like tightness, but quickly goes away within 20 minutes of walking. Pt will return to his job on Monday the . 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 He feels like he is progressing well with therapy and will be able to perform all tasks at his work including walking and ascending/descending stairs.    Pain  Current pain ratin  At best pain ratin  At worst pain rating: 3  Location: L knee  Quality: tight and discomfort         Objective          Active Range of Motion     Additional Active Range of Motion Details  Pre-treatment L knee  Flex - 117 degrees  Ext - 3 degrees   Post-treatment  Flex - 116 degrees   Ext - 1 degree    Passive Range of Motion     Additional Passive Range of Motion Details  Post treatment   Flex - 121 degrees   Ext - 1 degree      See Exercise, Manual, and Modality Logs for complete treatment.       Assessment & Plan       Assessment  Assessment details: Pt L knee AROM continues to be limited by anterior and posterior capsule tightness, shortened quadriceps and hamstring length, and cocontraction of the quadriceps and hamstring. AP and PA joint  mobilization of the L knee continued today with the knee at 90 degrees flexion and progressed to joint mobilizations at 105 degrees flexion to restore joint capsule mobility posteriorly. Passive and active stretching of the quadriceps and hamstring to address length impairments. Exercises progressed this session to add a TKE during TG squats and anterior step-ups. Anterior step ups added this session to add a muscular endurance component to the treatment session. Pt will return to work on 7/15/24 which includes walking up and down stairs multiple times per shift and walking for up to 1.25 miles at a time. Pt will continue to benefit from skilled pt to increase ROM, improve muscular strength and endurance, and improve gait mechanics.     Plan  Plan details: Continue strengthening into end-range L knee flexion and extension. Continue joint mobilizations and stretching of the quad/hamstring to increase ROM. Progress repetitions of exercises to increase muscular endurance to allow for return to work. Progress HEP as indicated.          Timed:         Manual Therapy:    8     mins  15856;     Therapeutic Exercise:    15     mins  46011;     Neuromuscular Evelio:    0    mins  55768;    Therapeutic Activity:     0     mins  55369;     Gait Trainin     mins  15480;     Ultrasound:     0     mins  47683;    Ionto                               0    mins   42710  Self Care                       0     mins   96246  Canalith Repos    0     mins 74927      Un-Timed:  Electrical Stimulation:    0     mins  68681 ( );  Dry Needling     0     mins self-pay  Traction     0     mins 38580      Timed Treatment:   23   mins   Total Treatment:     60   mins    Carter Anna PT  [place] License: [id number]    Denis Oseguera, Physical Therapist Student, completed treatment under my direct supervision.     2024

## 2024-07-10 RX ORDER — MONTELUKAST SODIUM 4 MG/1
1 TABLET, CHEWABLE ORAL 2 TIMES DAILY
Qty: 180 TABLET | Refills: 1 | Status: SHIPPED | OUTPATIENT
Start: 2024-07-10

## 2024-07-11 ENCOUNTER — TREATMENT (OUTPATIENT)
Dept: PHYSICAL THERAPY | Facility: CLINIC | Age: 69
End: 2024-07-11
Payer: MEDICARE

## 2024-07-11 DIAGNOSIS — M25.60 RANGE OF MOTION DEFICIT: ICD-10-CM

## 2024-07-11 DIAGNOSIS — M25.562 ACUTE PAIN OF LEFT KNEE: ICD-10-CM

## 2024-07-11 DIAGNOSIS — Z96.652 STATUS POST LEFT KNEE REPLACEMENT: Primary | ICD-10-CM

## 2024-07-11 DIAGNOSIS — R53.1 WEAKNESS: ICD-10-CM

## 2024-07-11 NOTE — PROGRESS NOTES
Physical Therapy Daily Treatment Note  [facility] [place]  [address], [address]  [place] [address]      Patient: Chava Santiago   : 1955  Referring practitioner: Rolando Woodruff MD  Date of Initial Visit: Type: THERAPY  Noted: 2024  Today's Date: 2024  Patient seen for 16 sessions       Visit Diagnoses:    ICD-10-CM ICD-9-CM   1. Status post left knee replacement  Z96.652 V43.65   2. Weakness  R53.1 780.79   3. Range of motion deficit  M25.60 719.50   4. Acute pain of left knee  M25.562 719.46       Subjective Evaluation    History of Present Illness  Mechanism of injury: Pt reports that yesterday was the best his L knee has felt in years. He reports that he has been increasing the amount he has been walking and has been going up and down hills without increased pain. He still has reports of tightness in the morning that resolved within 10 or 15 minutes of walking.     Pain  Current pain ratin  At best pain ratin  At worst pain ratin  Location: L knee  Quality: discomfort and tight         Objective          Active Range of Motion     Additional Active Range of Motion Details  L Knee  Flex - 118 degrees  Ext - 1 degree      See Exercise, Manual, and Modality Logs for complete treatment.       Assessment & Plan       Assessment  Assessment details: Pt is feeling much better and feels that he is ready to return to work. His AROM deficit into knee flexion and extension is primarily due to cocontraction of the hamstrings and quadriceps with some additional capsular and muscular tightness limiting motion. Pt activation of the quadriceps is strong. Cocontraction of the quad and hamstrings being addressed with exercises aimed TKE with plantarflexion of the ankle and prone TKE. PNF contract/relax stretching of the quad and hamstring into flexion/extension increased pt report of pain but increased PROM into those movements. Pt will return to work 7/15. Pt was instructed to  take some breaks during his shift and to ascend/descend stairs safely with a step to gait pattern.     Plan  Plan details: Continue joint mobilizations of the L knee. Continue stretching of the quad and hamstrings and strengthening into end ranges. Progress HEP as indicated.           Timed:         Manual Therapy:    15     mins  91990;     Therapeutic Exercise:    8     mins  37728;     Neuromuscular Evelio:    0    mins  99925;    Therapeutic Activity:     0     mins  45952;     Gait Trainin     mins  11193;     Ultrasound:     0     mins  18733;    Ionto                               0    mins   61530  Self Care                       0     mins   60100  Canalith Repos    0     mins 84754      Un-Timed:  Electrical Stimulation:    0     mins  65739 ( );  Dry Needling     0     mins self-pay  Traction     0     mins 46735      Timed Treatment:   23   mins   Total Treatment:     70   mins    MAURICIO Branch License: [id number]    Denis Oseguera, Physical Therapist Student, completed treatment under my direct supervision.     2024

## 2024-07-16 ENCOUNTER — TREATMENT (OUTPATIENT)
Dept: PHYSICAL THERAPY | Facility: CLINIC | Age: 69
End: 2024-07-16
Payer: MEDICARE

## 2024-07-16 DIAGNOSIS — I10 ESSENTIAL HYPERTENSION, BENIGN: ICD-10-CM

## 2024-07-16 DIAGNOSIS — M25.562 ACUTE PAIN OF LEFT KNEE: ICD-10-CM

## 2024-07-16 DIAGNOSIS — Z96.652 STATUS POST LEFT KNEE REPLACEMENT: Primary | ICD-10-CM

## 2024-07-16 DIAGNOSIS — R53.1 WEAKNESS: ICD-10-CM

## 2024-07-16 DIAGNOSIS — M25.60 RANGE OF MOTION DEFICIT: ICD-10-CM

## 2024-07-16 DIAGNOSIS — E78.2 MIXED HYPERLIPIDEMIA: ICD-10-CM

## 2024-07-16 RX ORDER — HYDROCHLOROTHIAZIDE 25 MG/1
25 TABLET ORAL DAILY
Qty: 90 TABLET | Refills: 3 | Status: SHIPPED | OUTPATIENT
Start: 2024-07-16

## 2024-07-16 RX ORDER — PRAVASTATIN SODIUM 40 MG
40 TABLET ORAL DAILY
Qty: 90 TABLET | Refills: 3 | Status: SHIPPED | OUTPATIENT
Start: 2024-07-16

## 2024-07-16 NOTE — PROGRESS NOTES
Physical Therapy Daily Treatment Note  [facility] [place] [facility]- [place]  [address], [address]  [place] [address]      Patient: Chava Santiago   : 1955  Referring practitioner: Rolando Woodruff MD  Date of Initial Visit: Type: THERAPY  Noted: 2024  Today's Date: 2024  Patient seen for 17 sessions       Visit Diagnoses:    ICD-10-CM ICD-9-CM   1. Status post left knee replacement  Z96.652 V43.65   2. Weakness  R53.1 780.79   3. Range of motion deficit  M25.60 719.50   4. Acute pain of left knee  M25.562 719.46       Subjective Evaluation    History of Present Illness  Mechanism of injury: Pt reports that he resumed his job yesterday as a  and walked approx. 4 miles and stood on tile for about 8 hours. He is sore today and feels tired from walking so much. He did not use a cane to ambulate and feels happy that he did not have to use one. He is having some pain just below his knee.   Pt also reports that he slept about 3 hours.    Pain  Current pain ratin  At best pain ratin  At worst pain rating: 3  Location: L knee  Quality: dull ache           Objective          Tenderness   Left Knee   Tenderness in the pes anserinus.     Active Range of Motion     Additional Active Range of Motion Details  L Knee  Flex - 115 degrees  Ext - 2 degrees       See Exercise, Manual, and Modality Logs for complete treatment.       Assessment & Plan       Assessment  Assessment details: Pt is having a slight exacerbation of pain in his L knee following his first night back at work that involves walking multiple times a night and standing on tile for extended periods of time. Pt was extremely tender to palpation over the pes anserine bursa and the medial hamstring tendon. I believe this is due to the sudden increase in walking activity. Pt AROM deficit into flexion and extension addressed by AP and PA knee joint mobilizations and stretching with overpressure into flexion and extension. Exercises  regressed today to allow the pes anserine to recover. Exercises to work end range flexion and TKE were still performed today to continue strengthening at end ranges. Cryotherapy was applied at the end of the session to reduce inflammation over the pes anserine. Pt instructed to perform home exercise stretches after each round of walking at work. Pt instructed to ice his knee over the pes anserine at home for pain relief and to reduce inflammation.   Of note, pt seemed extremely tired today and reports that he slept only 3 hours. He plans to get more sleep after the treatment session    Plan  Plan details: Continue knee joint mobilizations and stretches of the quad and hamstring to restore AROM. Reintroduce exercises aimed at strengthening the quad, hamstring, and glute med next session as indicated. Consider modalities to the pes anserine. Progress HEP as indicated.           Timed:         Manual Therapy:    15     mins  31115;     Therapeutic Exercise:    30     mins  86254;     Neuromuscular Evelio:    0    mins  63031;    Therapeutic Activity:     0     mins  85196;     Gait Trainin     mins  32956;     Ultrasound:     0     mins  84231;    Ionto                               0    mins   85911  Self Care                       0     mins   58651  Canalith Repos    0     mins 49118      Un-Timed:  Electrical Stimulation:    0     mins  29624 ( );  Dry Needling     0     mins self-pay  Traction     0     mins 69363      Timed Treatment:   45   mins   Total Treatment:     60   mins    MAURICIO Branch License: [id number]    Denis Oseguera, Physical Therapist Student, completed treatment under my direct supervision.     2024

## 2024-07-18 ENCOUNTER — TREATMENT (OUTPATIENT)
Dept: PHYSICAL THERAPY | Facility: CLINIC | Age: 69
End: 2024-07-18
Payer: MEDICARE

## 2024-07-18 DIAGNOSIS — M25.562 ACUTE PAIN OF LEFT KNEE: ICD-10-CM

## 2024-07-18 DIAGNOSIS — R53.1 WEAKNESS: ICD-10-CM

## 2024-07-18 DIAGNOSIS — M25.60 RANGE OF MOTION DEFICIT: ICD-10-CM

## 2024-07-18 DIAGNOSIS — Z96.652 STATUS POST LEFT KNEE REPLACEMENT: Primary | ICD-10-CM

## 2024-07-18 NOTE — PROGRESS NOTES
Physical Therapy Daily Treatment Note  [facility]- [place]  [address], [address]  [place] [address]      Patient: Chava Santiago   : 1955  Referring practitioner: Rolando Woodruff MD  Date of Initial Visit: Type: THERAPY  Noted: 2024  Today's Date: 2024  Patient seen for 18 sessions       Visit Diagnoses:    ICD-10-CM ICD-9-CM   1. Status post left knee replacement  Z96.652 V43.65   2. Weakness  R53.1 780.79   3. Range of motion deficit  M25.60 719.50   4. Acute pain of left knee  M25.562 719.46       Subjective Evaluation    History of Present Illness  Mechanism of injury: Pt reports increased pain in the L hip starting Tuesday morning following a return to work as a . He is walking ~4 miles each night and is standing on tile for the entirety of the night shift. Pain in the L hip increased during the shift last night throughout the shift and has been bothering him since. Pt reports that he was able to jog last night for around 30 ft and had no pain in the knee.     Pain  Current pain ratin  At best pain ratin  At worst pain ratin  Location: L knee; L hip current 7/10, worst 9/10  Quality: dull ache           Objective          Palpation   Left   Tenderness of the gluteus medius and piriformis.     Active Range of Motion     Additional Active Range of Motion Details  Pre-treatment L knee  Flex - 114 degrees   Ext - 4 degrees     Post-treatment  Flex - 120 degrees   Ext - 2 degrees     Passive Range of Motion     Additional Passive Range of Motion Details  L knee  Flex - 124 degrees  Ext - 1 degree      See Exercise, Manual, and Modality Logs for complete treatment.       Assessment & Plan       Assessment  Assessment details: Pt is experiencing an exacerbation of L hip and thigh pain which I believe is due to increased walking and weight-bearing activities. Pt was highly tender to palpation over the L glute medius and piriformis. LAD to the L hip did not  relieve symptoms. Exercises deferred due to pain in the L hip. Stretching of the quadriceps and hamstring to lengthen tight musculature impairing AROM continued today. Joint mobilizations of the L knee to stretch the anterior and posterior joint capsule tolerated well. AAROM of the L knee into flexion and extension greater than AROM which leads me to believe AROM deficit is due to cocontraction of the quads and hamstrings. Pt will benefit from a few days of rest from HEP and active rest.     Plan  Plan details: Continue quad and hamstring strengthening and stretching. Address cocontraction with TKE exercises. Add glute medius exercises. Consider modalities to address L hip pain. Evaluate standing posture/footwear he uses for work at some point. Progress HEP/restart HEP as indicated.           Timed:         Manual Therapy:    15     mins  49585;     Therapeutic Exercise:    45     mins  43430;     Neuromuscular Evelio:    0    mins  14725;    Therapeutic Activity:     0     mins  02847;     Gait Trainin     mins  15366;     Ultrasound:     0     mins  97153;    Ionto                               0    mins   11564  Self Care                       0     mins   87648  Canalith Repos    0     mins 29392      Un-Timed:  Electrical Stimulation:    0     mins  82769 ( );  Dry Needling     0     mins self-pay  Traction     0     mins 53528      Timed Treatment:   60   mins   Total Treatment:     60   mins    Carter Anna PT  [place] License: [id number]    Denis Oseguera, Physical Therapist Student, completed treatment under my direct supervision.     2024

## 2024-07-22 ENCOUNTER — OFFICE VISIT (OUTPATIENT)
Dept: ORTHOPEDIC SURGERY | Facility: CLINIC | Age: 69
End: 2024-07-22
Payer: MEDICARE

## 2024-07-22 VITALS
SYSTOLIC BLOOD PRESSURE: 134 MMHG | HEIGHT: 71 IN | WEIGHT: 214.4 LBS | BODY MASS INDEX: 30.02 KG/M2 | DIASTOLIC BLOOD PRESSURE: 82 MMHG

## 2024-07-22 DIAGNOSIS — M65.352 TRIGGER LITTLE FINGER OF LEFT HAND: Primary | ICD-10-CM

## 2024-07-22 DIAGNOSIS — M65.342 TRIGGER RING FINGER OF LEFT HAND: ICD-10-CM

## 2024-07-22 PROCEDURE — 3075F SYST BP GE 130 - 139MM HG: CPT | Performed by: STUDENT IN AN ORGANIZED HEALTH CARE EDUCATION/TRAINING PROGRAM

## 2024-07-22 PROCEDURE — 1159F MED LIST DOCD IN RCRD: CPT | Performed by: STUDENT IN AN ORGANIZED HEALTH CARE EDUCATION/TRAINING PROGRAM

## 2024-07-22 PROCEDURE — 99213 OFFICE O/P EST LOW 20 MIN: CPT | Performed by: STUDENT IN AN ORGANIZED HEALTH CARE EDUCATION/TRAINING PROGRAM

## 2024-07-22 PROCEDURE — 20550 NJX 1 TENDON SHEATH/LIGAMENT: CPT | Performed by: STUDENT IN AN ORGANIZED HEALTH CARE EDUCATION/TRAINING PROGRAM

## 2024-07-22 PROCEDURE — 1160F RVW MEDS BY RX/DR IN RCRD: CPT | Performed by: STUDENT IN AN ORGANIZED HEALTH CARE EDUCATION/TRAINING PROGRAM

## 2024-07-22 PROCEDURE — 3079F DIAST BP 80-89 MM HG: CPT | Performed by: STUDENT IN AN ORGANIZED HEALTH CARE EDUCATION/TRAINING PROGRAM

## 2024-07-22 RX ORDER — TRAMADOL HYDROCHLORIDE 50 MG/1
50 TABLET ORAL EVERY 6 HOURS PRN
COMMUNITY

## 2024-07-22 RX ORDER — LIDOCAINE HYDROCHLORIDE 10 MG/ML
0.5 INJECTION, SOLUTION EPIDURAL; INFILTRATION; INTRACAUDAL; PERINEURAL
Status: COMPLETED | OUTPATIENT
Start: 2024-07-22 | End: 2024-07-22

## 2024-07-22 RX ORDER — TRIAMCINOLONE ACETONIDE 40 MG/ML
20 INJECTION, SUSPENSION INTRA-ARTICULAR; INTRAMUSCULAR
Status: COMPLETED | OUTPATIENT
Start: 2024-07-22 | End: 2024-07-22

## 2024-07-22 RX ADMIN — LIDOCAINE HYDROCHLORIDE 0.5 ML: 10 INJECTION, SOLUTION EPIDURAL; INFILTRATION; INTRACAUDAL; PERINEURAL at 14:13

## 2024-07-22 RX ADMIN — TRIAMCINOLONE ACETONIDE 20 MG: 40 INJECTION, SUSPENSION INTRA-ARTICULAR; INTRAMUSCULAR at 14:13

## 2024-07-22 NOTE — PROGRESS NOTES
[facility]     Office Visit       Date: 07/22/2024   Patient Name: Chava Santiago  MRN: [id number]  YOB: 1955    Referring Physician: No ref. provider found     Chief Complaint:   Chief Complaint   Patient presents with    Left Hand - Pain     Ring and small finger     History of Present Illness:   Chava Santiago is a 68 y.o. male right-hand-dominant clinic clinic complaints of left small and ring finger catching and locking.  I have previously treated the patient for a left ring trigger finger, and right ring and long trigger fingers.  These have responded well to prior injections.  He states symptoms to the left ring and small finger have been present for approximately 1 month.  There is pain associate with catching and locking of the digits.  He has not tempted any prior injections.  No therapy.  No other complaints or concerns.    Previously had bilateral carpal tunnels released.    Subjective   Review of Systems:   Review of Systems   Constitutional:  Negative for chills, fever, unexpected weight gain and unexpected weight loss.   HENT:  Negative for congestion, postnasal drip and rhinorrhea.    Eyes:  Negative for blurred vision.   Respiratory:  Negative for shortness of breath.    Cardiovascular:  Negative for leg swelling.   Gastrointestinal:  Negative for abdominal pain, nausea and vomiting.   Genitourinary:  Negative for difficulty urinating.   Musculoskeletal:  Positive for arthralgias. Negative for gait problem, joint swelling and myalgias.   Skin:  Negative for skin lesions and wound.   Neurological:  Negative for dizziness, weakness, light-headedness and numbness.   Hematological:  Does not bruise/bleed easily.   Psychiatric/Behavioral:  Negative for depressed mood.       Pertinent review of systems per HPI    I reviewed the patient's chief complaint, history of present illness, review of systems, past  "medical history, surgical history, family history, social history, medications and allergy list in the EMR on 07/22/2024 and agree with the findings above.    Objective    Vital Signs:   Vitals:    07/22/24 1338   BP: 134/82   Weight: 97.3 kg (214 lb 6.4 oz)   Height: 180.3 cm (70.98\")     BMI:      General: No acute distress. Alert and oriented.   Cardiovascular: Palpable radial pulse.   Respiratory: Breathing is nonlabored.     Ortho Exam:  Examination of bilateral upper extremities demonstrates no deformity.  No skin lesions or abrasions.  Healed surgical incisions overlying the carpal tunnels.  He is nontender to the A1 pulleys of the right hand.  He is tender along the left ring and small finger A1 pulleys.  There is catching and locking of the left ring and small finger during examination.  He is able to make a composite fist.  Sensation is intact to light touch throughout the hand.  Warm and well-perfused distally.    Imaging / Studies:    Imaging Results (Last 24 Hours)       ** No results found for the last 24 hours. **          Procedure Note:  After reviewing the risks, benefits and alternatives to a steroid injection, which include but are not limited to; hypopigmentation, fat necrosis/atrophy, pain, swelling, bleeding, bruising, damage to nearby nerves/vessels, allergic reaction , transient elevation in blood glucose levels and infection a verbal consent was obtained. A time-out was then performed and the affected hand was prepped with chlorhexadine soap and ethyl chloride was used to numb the skin. The left ring and small finger flexor tendon sheaths were injected with 0.5cc: 0.5cc mixture of Kenalog - 40 mg/ml and Lidocaine - 1% / 2 ml. The injection was well tolerated and a sterile dressing was applied. There were no complications. I advised the patient that they might experience some local discomfort for the next couple days and can apply ice to the site as needed.    Assessment / Plan  "   Assessment/Plan:   Chava Santiago is a 68 y.o. male with left small and ring trigger fingers.    The patient has had resolution of symptoms to the right hand after injections during his last clinic visit.  He now has symptomatic left ring and small trigger fingers.  He was most interested in injection today.  These provided and tolerated well.  We discussed gentle stretching and nighttime Coban wrapping.  He was agreeable.  I will see him back in 3 months for reevaluation.  All questions were addressed.  He was agreeable.      ICD-10-CM ICD-9-CM   1. Trigger little finger of left hand  M65.352 727.03   2. Trigger ring finger of left hand  M65.342 727.03     Follow Up:   Return in about 3 months (around 10/22/2024) for Follow Up.      Mary Ann Genao MD  [facility]

## 2024-07-22 NOTE — PROGRESS NOTES
Procedure   - Hand/Upper Extremity Injection: L ring A1 for trigger finger on 7/22/2024 2:13 PM  Indications: pain  Details: 27 G needle, volar approach  Medications: 20 mg triamcinolone acetonide 40 MG/ML; 0.5 mL lidocaine PF 1% 1 %  Outcome: tolerated well, no immediate complications  Procedure, treatment alternatives, risks and benefits explained, specific risks discussed. Consent was given by the patient. Immediately prior to procedure a time out was called to verify the correct patient, procedure, equipment, support staff and site/side marked as required. Patient was prepped and draped in the usual sterile fashion.       - Hand/Upper Extremity Injection: L small A1 for trigger finger on 7/22/2024 2:13 PM  Indications: pain  Details: 27 G needle, volar approach  Medications: 0.5 mL lidocaine PF 1% 1 %; 20 mg triamcinolone acetonide 40 MG/ML  Outcome: tolerated well, no immediate complications  Procedure, treatment alternatives, risks and benefits explained, specific risks discussed. Immediately prior to procedure a time out was called to verify the correct patient, procedure, equipment, support staff and site/side marked as required. Patient was prepped and draped in the usual sterile fashion.

## 2024-07-23 ENCOUNTER — TREATMENT (OUTPATIENT)
Dept: PHYSICAL THERAPY | Facility: CLINIC | Age: 69
End: 2024-07-23
Payer: MEDICARE

## 2024-07-23 DIAGNOSIS — R53.1 WEAKNESS: ICD-10-CM

## 2024-07-23 DIAGNOSIS — M25.562 ACUTE PAIN OF LEFT KNEE: ICD-10-CM

## 2024-07-23 DIAGNOSIS — Z96.652 STATUS POST LEFT KNEE REPLACEMENT: Primary | ICD-10-CM

## 2024-07-23 DIAGNOSIS — M25.60 RANGE OF MOTION DEFICIT: ICD-10-CM

## 2024-07-23 NOTE — PROGRESS NOTES
Physical Therapy Daily Treatment Note  [facility] [place]  [address], [address]  [place] [address]      Patient: Chava Santiago   : 1955  Referring practitioner: Rolando Woodruff MD  Date of Initial Visit: Type: THERAPY  Noted: 2024  Today's Date: 2024  Patient seen for 19 sessions       Visit Diagnoses:    ICD-10-CM ICD-9-CM   1. Status post left knee replacement  Z96.652 V43.65   2. Weakness  R53.1 780.79   3. Range of motion deficit  M25.60 719.50   4. Acute pain of left knee  M25.562 719.46       Subjective Evaluation    History of Present Illness  Mechanism of injury: The patient stated that his knee was feeling good today but anticipates a difficult workday tomorrow night and fears it will increase his pain.  He remains compliant with his home exercise program and feels his motion is coming along.    Pain  Current pain ratin  Location: L knee           Objective          Active Range of Motion   Left Knee   Flexion: 115 degrees   Extension: 0 degrees       See Exercise, Manual, and Modality Logs for complete treatment.       Assessment & Plan       Assessment  Assessment details: The patient actively moved to full extension for the first time since surgery following manual therapy today.  Active range of motion continues to improve nicely and remains limited primarily by tightness of the hamstrings and quads as opposed to capsular restrictions.  As his quad get stronger I anticipate he will be able to overcome resistance of the hamstrings for full range of motion.  Cocontraction of the hamstrings is also a limitation for full motion but this is improving.  He tolerated strengthening exercises well today with minimal complaints of pain and should have improved tolerance to activity over the course of the next few weeks as he gets stronger.    Plan  Plan details: Continue range of motion exercises, manual therapy, and strengthening exercises for the lower  extremity.          Timed:         Manual Therapy:    15     mins  51348;     Therapeutic Exercise:    40     mins  25503;     Neuromuscular Evelio:    0    mins  42539;    Therapeutic Activity:     0     mins  21747;     Gait Trainin     mins  28984;     Ultrasound:     0     mins  78827;    Ionto                               0    mins   82093  Self Care                       0     mins   09679  Canalith Repos    0     mins 37695      Un-Timed:  Electrical Stimulation:    0     mins  78854 ( );  Dry Needling     0     mins self-pay  Traction     0     mins 40861      Timed Treatment:   55   mins   Total Treatment:     65   mins    Carter Anna, PT  [place] License: [id number]

## 2024-07-24 RX ORDER — NABUMETONE 750 MG/1
TABLET, FILM COATED ORAL
Qty: 60 TABLET | Refills: 1 | Status: SHIPPED | OUTPATIENT
Start: 2024-07-24

## 2024-07-25 ENCOUNTER — TREATMENT (OUTPATIENT)
Dept: PHYSICAL THERAPY | Facility: CLINIC | Age: 69
End: 2024-07-25
Payer: MEDICARE

## 2024-07-25 DIAGNOSIS — M25.60 RANGE OF MOTION DEFICIT: ICD-10-CM

## 2024-07-25 DIAGNOSIS — Z96.652 STATUS POST LEFT KNEE REPLACEMENT: Primary | ICD-10-CM

## 2024-07-25 DIAGNOSIS — M25.562 ACUTE PAIN OF LEFT KNEE: ICD-10-CM

## 2024-07-25 DIAGNOSIS — R53.1 WEAKNESS: ICD-10-CM

## 2024-07-25 NOTE — PROGRESS NOTES
Physical Therapy Daily Treatment Note  [facility] [place]  [address], [address]  [place] [address]      Patient: Chava Santiago   : 1955  Referring practitioner: Rolando Woodruff MD  Date of Initial Visit: Type: THERAPY  Noted: 2024  Today's Date: 2024  Patient seen for 20 sessions       Visit Diagnoses:    ICD-10-CM ICD-9-CM   1. Status post left knee replacement  Z96.652 V43.65   2. Weakness  R53.1 780.79   3. Acute pain of left knee  M25.562 719.46   4. Range of motion deficit  M25.60 719.50       Subjective Evaluation    History of Present Illness  Mechanism of injury: The patient had a very busy day at work last night and stated he did a lot of walking and a lot of stairs.  This significantly increased the pain in his knee and into his hip.  He reported feeling very fatigued today.    Pain  Current pain ratin  Location: Left knee           Objective          Active Range of Motion   Left Knee   Flexion: 112 degrees   Extension: 0 degrees     Additional Active Range of Motion Details  2/105 L knee AROM upon presentation (0/116 at the end of last visit)    See Exercise, Manual, and Modality Logs for complete treatment.       Assessment & Plan       Assessment  Assessment details: The patient performed a lot of walking and stair use at work last night and reported fatigue and worsened pain in his left knee upon presentation today.  Fortunately, active range of motion was largely maintained compared to his last visit, which is significantly better than what was observed last week.  Despite pain, he was able to activate the quads fairly efficiently, though they were fatigued easily with exercise.  He will need to improve his quad strength to have better tolerance to work duties.  Quad strengthening exercises were continued in the clinic today within patient tolerance, but the patient did not tolerate quite as long a session as usual.  Overall, he is continuing to see  improvement with PT intervention.    Plan  Plan details: Continue active range of motion exercises, quad strengthening, and joint mobilizations.          Timed:         Manual Therapy:    15     mins  43995;     Therapeutic Exercise:    25     mins  89395;     Neuromuscular Evelio:    13    mins  19935;    Therapeutic Activity:     0     mins  55470;     Gait Trainin     mins  19248;     Ultrasound:     0     mins  52035;    Ionto                               0    mins   95703  Self Care                       0     mins   27395  Canalith Repos    0     mins 01605      Un-Timed:  Electrical Stimulation:    0     mins  35599 ( );  Dry Needling     0     mins self-pay  Traction     0     mins 79827      Timed Treatment:   53   mins   Total Treatment:     53   mins    MAURICIO Branch License: [id number]

## 2024-07-30 ENCOUNTER — TREATMENT (OUTPATIENT)
Dept: PHYSICAL THERAPY | Facility: CLINIC | Age: 69
End: 2024-07-30
Payer: MEDICARE

## 2024-07-30 DIAGNOSIS — R53.1 WEAKNESS: ICD-10-CM

## 2024-07-30 DIAGNOSIS — Z96.652 STATUS POST LEFT KNEE REPLACEMENT: Primary | ICD-10-CM

## 2024-07-30 DIAGNOSIS — M25.562 ACUTE PAIN OF LEFT KNEE: ICD-10-CM

## 2024-07-30 DIAGNOSIS — M25.60 RANGE OF MOTION DEFICIT: ICD-10-CM

## 2024-07-30 NOTE — PROGRESS NOTES
Physical Therapy Daily Treatment Note  [facility] [place]  [address], [address]  [place] [address]      Patient: Chava Santiago   : 1955  Referring practitioner: Rolando Woodruff MD  Date of Initial Visit: Type: THERAPY  Noted: 2024  Today's Date: 2024  Patient seen for 21 sessions       Visit Diagnoses:    ICD-10-CM ICD-9-CM   1. Status post left knee replacement  Z96.652 V43.65   2. Weakness  R53.1 780.79   3. Acute pain of left knee  M25.562 719.46   4. Range of motion deficit  M25.60 719.50       Subjective Evaluation    History of Present Illness  Mechanism of injury: The patient stated that his knee was feeling much better today than it was last visit.  He has another very busy day of work coming up next week.  He would like to continue PT to improve his motion and strength.  He stated he still has difficulty navigating stairs, particularly with descent.    Pain  Current pain ratin  Location: Left knee         Objective          Active Range of Motion   Left Knee   Flexion: 118 degrees   Extension: 0 degrees       See Exercise, Manual, and Modality Logs for complete treatment.       Assessment & Plan       Assessment  Assessment details: The patient presented with knee stiffness upon presentation but saw a quick improvement in active range of motion with manual therapy.  He achieved 0/118 by the end of the visit, and has seen a steady improvement in motion over the last couple of weeks.  His quads remain weak and will need to get stronger in order to improve his tolerance to work.  Strengthening exercises were continued within his tolerance today and stair activities were introduced.  These proved very challenging and were only able to be performed at low repetitions.  He will benefit from transition to more functional strengthening in the next few weeks.    Plan  Plan details: Continue functional strengthening of the quads.  Active range of motion exercises and manual  therapy as tolerated.          Timed:         Manual Therapy:    10     mins  67004;     Therapeutic Exercise:    50     mins  34835;     Neuromuscular Evelio:    0    mins  42899;    Therapeutic Activity:     0     mins  34418;     Gait Trainin     mins  23297;     Ultrasound:     0     mins  02165;    Ionto                               0    mins   20485  Self Care                       0     mins   33237  Canalith Repos    0     mins 97374      Un-Timed:  Electrical Stimulation:    0     mins  94147 ( );  Dry Needling     0     mins self-pay  Traction     0     mins 48301      Timed Treatment:   60   mins   Total Treatment:     60   mins    Carter Anna, PT  [place] License: [id number]

## 2024-08-06 ENCOUNTER — TREATMENT (OUTPATIENT)
Dept: PHYSICAL THERAPY | Facility: CLINIC | Age: 69
End: 2024-08-06
Payer: MEDICARE

## 2024-08-06 DIAGNOSIS — M25.562 ACUTE PAIN OF LEFT KNEE: ICD-10-CM

## 2024-08-06 DIAGNOSIS — M25.60 RANGE OF MOTION DEFICIT: ICD-10-CM

## 2024-08-06 DIAGNOSIS — R53.1 WEAKNESS: ICD-10-CM

## 2024-08-06 DIAGNOSIS — Z96.652 STATUS POST LEFT KNEE REPLACEMENT: Primary | ICD-10-CM

## 2024-08-06 NOTE — PROGRESS NOTES
Physical Therapy Daily Treatment Note  [facility] [place]  [address], [facility] [address]  [place] [address]      Patient: Chava Santiago   : 1955  Referring practitioner: Rolando Woodruff MD  Date of Initial Visit: Type: THERAPY  Noted: 2024  Today's Date: 2024  Patient seen for 22 sessions       Visit Diagnoses:    ICD-10-CM ICD-9-CM   1. Status post left knee replacement  Z96.652 V43.65   2. Weakness  R53.1 780.79   3. Acute pain of left knee  M25.562 719.46   4. Range of motion deficit  M25.60 719.50       Subjective Evaluation    History of Present Illness  Mechanism of injury: The patient stated that he has been very busy at work and has worked 12 days in a row.  This has led to fatigue in the left knee but he denied any significant increase in pain.  Overall he feels he is tolerating prolonged time on his feet better.    Pain  Current pain ratin  Location: L knee         Objective   See Exercise, Manual, and Modality Logs for complete treatment.       Assessment & Plan       Assessment  Assessment details: The patient has worked 12 straight days and reported fatigue and general soreness in his knee as a result.  However, he demonstrated stable gait and further improvement in active range of motion compared to his last visit.  He will need to continue building strength in order to improve his activity tolerance but is doing well between visits with his stretching program.  Strengthening exercises were progressed in the clinic today and were tolerated well.  He should be able to transition to an independent program soon.    Plan  Plan details: Continue strengthening exercises through end range of motion.          Timed:         Manual Therapy:    10     mins  72360;     Therapeutic Exercise:    45     mins  74903;     Neuromuscular Evelio:    0    mins  64665;    Therapeutic Activity:     0     mins  86422;     Gait Trainin     mins  28391;     Ultrasound:     0     mins   96804;    Ionto                               0    mins   31958  Self Care                       0     mins   28034  Canalith Repos    0     mins 76942      Un-Timed:  Electrical Stimulation:    0     mins  33254 ( );  Dry Needling     0     mins self-pay  Traction     0     mins 86433      Timed Treatment:   55   mins   Total Treatment:     55   mins    Carter Anna, PT  [place] License: [id number]

## 2024-08-14 ENCOUNTER — TREATMENT (OUTPATIENT)
Dept: PHYSICAL THERAPY | Facility: CLINIC | Age: 69
End: 2024-08-14
Payer: MEDICARE

## 2024-08-14 DIAGNOSIS — M25.562 ACUTE PAIN OF LEFT KNEE: ICD-10-CM

## 2024-08-14 DIAGNOSIS — Z96.652 STATUS POST LEFT KNEE REPLACEMENT: Primary | ICD-10-CM

## 2024-08-14 DIAGNOSIS — M25.60 RANGE OF MOTION DEFICIT: ICD-10-CM

## 2024-08-14 DIAGNOSIS — R53.1 WEAKNESS: ICD-10-CM

## 2024-08-15 NOTE — PROGRESS NOTES
Physical Therapy Daily Treatment Note  [facility] [place]  [address], [facility] [facility] [address]  [place] [address]      Patient: Chava Santiago   : 1955  Referring practitioner: Rolando Woodruff MD  Date of Initial Visit: Type: THERAPY  Noted: 2024  Today's Date: 2024  Patient seen for 23 sessions       Visit Diagnoses:    ICD-10-CM ICD-9-CM   1. Status post left knee replacement  Z96.652 V43.65   2. Weakness  R53.1 780.79   3. Acute pain of left knee  M25.562 719.46   4. Range of motion deficit  M25.60 719.50       Subjective Evaluation    History of Present Illness  Mechanism of injury: The patient reported that he has been out for over 24 hours and was very fatigued today.  He remains compliant with his home exercise program and has been spending a lot of time on his feet at work.  He is pleased with his progressions but would like to continue working on mobility and strength.    Pain  Current pain ratin  Location: Left knee           Objective   See Exercise, Manual, and Modality Logs for complete treatment.       Assessment & Plan       Assessment  Assessment details: The patient was very fatigued during his visit today secondary to no sleep in the last 24 hours.  As expected, his tolerance to exercise was decreased compared to previous visits.  However, his active range of motion was maintained and he continues to activate the quads well.  He will benefit from ongoing strengthening with transition to an eccentric quad strengthening protocol to work on stair descent in the near future.  He will also benefit from dynamic strengthening of the quads to challenge balance and stability.    Plan  Plan details: Continue quad strengthening with focus on power, eccentric control, and stability.          Timed:         Manual Therapy:    8     mins  57426;     Therapeutic Exercise:    30     mins  66569;     Neuromuscular Evelio:    0    mins  79730;    Therapeutic Activity:     0     mins  19835;      Gait Trainin     mins  43684;     Ultrasound:     0     mins  05912;    Ionto                               0    mins   82026  Self Care                       0     mins   78293  Canalith Repos    0     mins 64032      Un-Timed:  Electrical Stimulation:    0     mins  76017 ( );  Dry Needling     0     mins self-pay  Traction     0     mins 73270      Timed Treatment:   38   mins   Total Treatment:     60   mins    Carter Anna, PT  [place] License: [id number]

## 2024-08-21 ENCOUNTER — TREATMENT (OUTPATIENT)
Dept: PHYSICAL THERAPY | Facility: CLINIC | Age: 69
End: 2024-08-21
Payer: MEDICARE

## 2024-08-21 DIAGNOSIS — M25.562 ACUTE PAIN OF LEFT KNEE: ICD-10-CM

## 2024-08-21 DIAGNOSIS — Z96.652 STATUS POST LEFT KNEE REPLACEMENT: Primary | ICD-10-CM

## 2024-08-21 DIAGNOSIS — M25.60 RANGE OF MOTION DEFICIT: ICD-10-CM

## 2024-08-21 DIAGNOSIS — R53.1 WEAKNESS: ICD-10-CM

## 2024-08-21 NOTE — PROGRESS NOTES
Physical Therapy Daily Treatment Note  [facility] [place]  [address], [address]  [place] [address]      Patient: Chava Santiago   : 1955  Referring practitioner: Rolando Woodruff MD  Date of Initial Visit: Type: THERAPY  Noted: 2024  Today's Date: 2024  Patient seen for 24 sessions       Visit Diagnoses:    ICD-10-CM ICD-9-CM   1. Status post left knee replacement  Z96.652 V43.65   2. Weakness  R53.1 780.79   3. Acute pain of left knee  M25.562 719.46   4. Range of motion deficit  M25.60 719.50       Subjective Evaluation    History of Present Illness  Mechanism of injury: The patient stated that he has been very busy at work and has been fatigued as a result.  His knee has been tolerating work increasingly well this week.  He continues to get fatigued in the quads with prolonged standing and is walking more than 3 miles per shift.  He feels his motion is continuing to improve.    Pain  Current pain ratin  Location: Left knee           Objective          Active Range of Motion   Left Knee   Flexion: 117 degrees   Extension: 0 degrees       See Exercise, Manual, and Modality Logs for complete treatment.       Assessment & Plan       Assessment  Assessment details: Active range of motion of the left knee continues to improve with stretching between visits.  He achieved full extension upon presentation, though has ongoing tightness of the hamstrings.  Knee flexion was improved to 117 degrees for the first time since surgery.  I anticipate he will still be able to increase this with stretching of the quads over the next couple of months.  Quad strengthening was continued today and he had significant difficulty with lateral stepdown.  Eccentric strengthening of the quads will be emphasized in upcoming visits.    Plan  Plan details: Continue strengthening of the quads with focus on eccentric control.  Begin balance exercise.          Timed:         Manual Therapy:    10     mins   99517;     Therapeutic Exercise:    45     mins  31596;     Neuromuscular Evelio:    0    mins  26210;    Therapeutic Activity:     0     mins  91184;     Gait Trainin     mins  33394;     Ultrasound:     0     mins  44398;    Ionto                               0    mins   04709  Self Care                       0     mins   28246  Canalith Repos    0     mins 30464      Un-Timed:  Electrical Stimulation:    0     mins  92489 ( );  Dry Needling     0     mins self-pay  Traction     0     mins 20614      Timed Treatment:   55   mins   Total Treatment:     55   mins    Carter Anna PT  [place] License: [id number]

## 2024-08-27 ENCOUNTER — OFFICE VISIT (OUTPATIENT)
Dept: ORTHOPEDIC SURGERY | Facility: CLINIC | Age: 69
End: 2024-08-27
Payer: MEDICARE

## 2024-08-27 VITALS
BODY MASS INDEX: 30.52 KG/M2 | DIASTOLIC BLOOD PRESSURE: 88 MMHG | HEIGHT: 71 IN | WEIGHT: 218 LBS | SYSTOLIC BLOOD PRESSURE: 142 MMHG

## 2024-08-27 DIAGNOSIS — Z96.652 S/P TOTAL KNEE REPLACEMENT, LEFT: Primary | ICD-10-CM

## 2024-08-27 PROCEDURE — 3077F SYST BP >= 140 MM HG: CPT | Performed by: ORTHOPAEDIC SURGERY

## 2024-08-27 PROCEDURE — 1159F MED LIST DOCD IN RCRD: CPT | Performed by: ORTHOPAEDIC SURGERY

## 2024-08-27 PROCEDURE — 3079F DIAST BP 80-89 MM HG: CPT | Performed by: ORTHOPAEDIC SURGERY

## 2024-08-27 PROCEDURE — 99213 OFFICE O/P EST LOW 20 MIN: CPT | Performed by: ORTHOPAEDIC SURGERY

## 2024-08-27 PROCEDURE — 1160F RVW MEDS BY RX/DR IN RCRD: CPT | Performed by: ORTHOPAEDIC SURGERY

## 2024-08-27 NOTE — PROGRESS NOTES
Orthopaedic Clinic Note: Knee Established Patient    Chief Complaint   Patient presents with    Follow-up     2 month follow up - 3.5 months S/P Total Knee Arthroplasty With Lauro Robot - Left (DOS: 5/15/24)        HPI    It has been 2  month(s) since Mr. Santiago's last visit. He returns to clinic today for follow-up left total knee arthroplasty.  Patient is 3 and half months out from surgery.  Rates his pain 2/10 on the pain scale due to focal point tenderness in the medial parapatellar region.  Otherwise he is doing extremely well.  Rates his pain normally a 0/10 on the pain scale apart from this focal tenderness.  He is ambulating with no assistive device.  Nuys fevers chills or constitutional symptoms.  Overall he is happy with his outcome.    Past Medical History:   Diagnosis Date    Acute bronchitis     Acute pharyngitis     Conjunctivitis     CTS (carpal tunnel syndrome)     Gout     Hyperlipidemia     Hypertension     IBS (irritable bowel syndrome)     Knee swelling     Lumbago     Rhinitis     Tear of meniscus of knee       Past Surgical History:   Procedure Laterality Date    COLONOSCOPY      EYE SURGERY Bilateral     multiple on both eyes, RIGHT EYE DETACHED RETINA, LEFT EYE HAD EYELID SURGERY    HAND SURGERY  3/11/2022    BILATERAL CARPA TUNNEL RELEASE    HERNIA REPAIR Right     x2 INGUINAL    JOINT REPLACEMENT  5/15/2024    Left Knee Replacement    KNEE SURGERY Left     arthroscopy with meniscal repair- 10 years ago    TOTAL KNEE ARTHROPLASTY Left 05/15/2024    Procedure: TOTAL KNEE ARTHROPLASTY WITH LAURO ROBOT - LEFT;  Surgeon: Rolando Woodruff MD;  Location: [facility];  Service: Robotics - Ortho;  Laterality: Left;    VASECTOMY      WRIST SURGERY Left     CTR 3/11/22      Family History   Adopted: Yes     Social History     Socioeconomic History    Marital status:    Tobacco Use    Smoking status: Former     Current packs/day: 0.00     Types: Cigarettes     Quit date: 1/1/1983     Years since  quittin.6    Smokeless tobacco: Never   Vaping Use    Vaping status: Never Used   Substance and Sexual Activity    Alcohol use: Not Currently     Comment: occ    Drug use: No    Sexual activity: Not Currently     Partners: Female      Current Outpatient Medications on File Prior to Visit   Medication Sig Dispense Refill    allopurinol (ZYLOPRIM) 300 MG tablet       colestipol (COLESTID) 1 g tablet Take 1 tablet by mouth 2 (Two) Times a Day. 180 tablet 1    dicyclomine (BENTYL) 20 MG tablet TAKE 1 TABLET BY MOUTH 4 TIMES A DAY AS NEEDED FOR ABDOMINAL PAIN / DIARRHEA 60 tablet 5    hydroCHLOROthiazide 25 MG tablet TAKE 1 TABLET BY MOUTH DAILY 90 tablet 3    nabumetone (RELAFEN) 750 MG tablet TAKE 1 TABLET BY MOUTH TWICE A DAY 60 tablet 1    omeprazole (priLOSEC) 40 MG capsule TAKE ONE CAPSULE BY MOUTH DAILY 30 capsule 11    ondansetron (Zofran) 4 MG tablet Take 1 tablet by mouth Every 8 (Eight) Hours As Needed for Nausea or Vomiting. 10 tablet 1    pravastatin (PRAVACHOL) 40 MG tablet TAKE 1 TABLET BY MOUTH DAILY 90 tablet 3    prednisoLONE acetate (PRED FORTE) 1 % ophthalmic suspension       sucralfate (Carafate) 1 GM/10ML suspension Take 10 mL by mouth 4 (Four) Times a Day As Needed (ulcer pain). 414 mL 11    tamsulosin (FLOMAX) 0.4 MG capsule 24 hr capsule Take 1 capsule by mouth Daily. 10 capsule 0    traMADol (ULTRAM) 50 MG tablet Take 1 tablet by mouth Every 6 (Six) Hours As Needed for Moderate Pain.      valsartan (DIOVAN) 160 MG tablet TAKE 1 TABLET BY MOUTH DAILY 90 tablet 3     No current facility-administered medications on file prior to visit.      No Known Allergies     Review of Systems   Constitutional: Negative.    HENT: Negative.     Eyes: Negative.    Respiratory: Negative.     Cardiovascular: Negative.    Gastrointestinal: Negative.    Endocrine: Negative.    Genitourinary: Negative.    Musculoskeletal:  Positive for arthralgias.   Skin: Negative.    Allergic/Immunologic: Negative.   "  Neurological: Negative.    Hematological: Negative.    Psychiatric/Behavioral: Negative.          The patient's Review of Systems was personally reviewed and confirmed as accurate.    Physical Exam  Blood pressure 142/88, height 180.3 cm (70.98\"), weight 98.9 kg (218 lb).    Body mass index is 30.42 kg/m².    GENERAL APPEARANCE: awake, alert, oriented, in no acute distress and well developed, well nourished  LUNGS:  breathing nonlabored  EXTREMITIES: no clubbing, cyanosis  PERIPHERAL PULSES: palpable dorsalis pedis and posterior tibial pulses bilaterally.    GAIT:  Normal        ----------  Left Knee Exam:  ----------  ALIGNMENT: neutral  ----------  RANGE OF MOTION:  Normal (0-120 degrees) with no extensor lag or flexion contracture  LIGAMENTOUS STABILITY:   stable to varus and valgus stress at terminal extension and 30 degrees without any evidence of laxity  ----------  STRENGTH:  KNEE FLEXION 5/5  KNEE EXTENSION  5/5  ANKLE DORSIFLEXION  5/5  ANKLE PLANTARFLEXION  5/5  ----------  PAIN WITH PALPATION:medial joint line  KNEE EFFUSION: no  PAIN WITH KNEE ROM: no  PATELLAR CREPITUS:  no  ----------  SENSATION TO LIGHT TOUCH:  DEEP PERONEAL/SUPERFICIAL PERONEAL/SURAL/SAPHENOUS/TIBIAL:    intact  ----------  EDEMA:  no  ERYTHEMA:    no  WOUNDS/INCISIONS:   yes, well healed surgical incision without evidence of erythema or drainage  _____________________________________________________________________  _____________________________________________________________________    RADIOGRAPHIC FINDINGS:   Indication: Status post left total knee arthroplasty    Comparison: Todays xrays were compared to previous xrays from 6/25/2024    Knee films: Demonstrate well positioned knee arthroplasty components in satisfactory alignment without evidence of wear, loosening, subsidence, fracture, or osteolysis and No significant changes compared to prior radiographs.    Assessment/Plan:   Diagnosis Plan   1. S/P total knee replacement, " left  XR Knee 3+ View With [place] Left        Patient doing well 3-month status post left total knee arthroplasty.  Recommend activity as tolerated without restrictions.  His residual discomfort is due to some mild residual inflammation which should improve as time goes on.  I will see him back in 9 months for repeat evaluation with x-ray 3 views left knee on return.  He is welcome follow-up sooner should problems arise.    I recommend prophylactic antibiotics prior to invasive procedures indefinitely to minimize risk of prosthetic joint infection.  Amoxicillin 2 g orally 1 hour prior to procedure is recommended.        Rolando Woodruff MD  08/27/24  15:13 EDT

## 2024-08-28 ENCOUNTER — TREATMENT (OUTPATIENT)
Dept: PHYSICAL THERAPY | Facility: CLINIC | Age: 69
End: 2024-08-28
Payer: MEDICARE

## 2024-08-28 DIAGNOSIS — M25.60 RANGE OF MOTION DEFICIT: ICD-10-CM

## 2024-08-28 DIAGNOSIS — R53.1 WEAKNESS: ICD-10-CM

## 2024-08-28 DIAGNOSIS — Z96.652 STATUS POST LEFT KNEE REPLACEMENT: Primary | ICD-10-CM

## 2024-08-28 DIAGNOSIS — M25.562 ACUTE PAIN OF LEFT KNEE: ICD-10-CM

## 2024-08-28 NOTE — PROGRESS NOTES
Physical Therapy Daily Treatment Note  [facility] [place]  [address], [address]  [place] [address]      Patient: Chava Santiago   : 1955  Referring practitioner: Rolando Woodruff MD  Date of Initial Visit: Type: THERAPY  Noted: 2024  Today's Date: 2024  Patient seen for 25 sessions       Visit Diagnoses:    ICD-10-CM ICD-9-CM   1. Status post left knee replacement  Z96.652 V43.65   2. Weakness  R53.1 780.79   3. Acute pain of left knee  M25.562 719.46   4. Range of motion deficit  M25.60 719.50       Subjective Evaluation    History of Present Illness  Mechanism of injury: The patient reported recurrence of pain at the pes anserinus in the past week following his last visit.  He has seen a gradual reduction in pain with walking.  He is tolerating work well overall but continues to be fatigued at the end of the shift.  He saw Dr. Woodruff yesterday and does not schedule to see him again until his 1 year follow-up.    Pain  Current pain ratin  Location: L knee         Objective          Active Range of Motion   Left Knee   Flexion: 120 degrees   Extension: 0 degrees       See Exercise, Manual, and Modality Logs for complete treatment.       Assessment & Plan       Assessment  Assessment details: Active range of motion of the left knee looked excellent today and he has met his long-term goal for mobility.  He continues to experience tightness of the quads and should see a reduction in this with time and ongoing stretching.  Quad strength is coming along and is also expected to improve works on his home exercise program and continues with his daily activities.  He has had irritation of the medial knee this week which is likely attributable to prolonged walking at work.  He was encouraged to use Voltaren cream and ice to manage inflammation.  He was shown advanced quad strengthening and balance exercises today that will serve as his long-term home exercise program.  He is to  transition to these as tolerated over the next few weeks.  He should do well with a trial of independent management before returning for reassessment and discharge planning in approximately 1 month.    Plan  Plan details: Patient to attempt 3 to 4 weeks of independent management before returning for reassessment and discharge planning.      Timed:         Manual Therapy:    10     mins  67213;     Therapeutic Exercise:    28     mins  99763;     Neuromuscular Evelio:    0    mins  59427;    Therapeutic Activity:     0     mins  93681;     Gait Trainin     mins  74037;     Ultrasound:     0     mins  26703;    Ionto                               0    mins   03274  Self Care                       0     mins   91516  Canalith Repos    0     mins 63005      Un-Timed:  Electrical Stimulation:    0     mins  27404 ( );  Dry Needling     0     mins self-pay  Traction     0     mins 66597      Timed Treatment:   38   mins   Total Treatment:     60   mins    Carter Anna PT  [place] License: [id number]

## 2024-08-30 RX ORDER — OMEPRAZOLE 40 MG/1
40 CAPSULE, DELAYED RELEASE ORAL DAILY
Qty: 90 CAPSULE | Refills: 3 | Status: SHIPPED | OUTPATIENT
Start: 2024-08-30

## 2024-09-03 RX ORDER — ALLOPURINOL 300 MG/1
300 TABLET ORAL DAILY
Qty: 30 TABLET | Refills: 5 | Status: SHIPPED | OUTPATIENT
Start: 2024-09-03

## 2024-09-12 RX ORDER — TRAMADOL HYDROCHLORIDE 50 MG/1
50 TABLET ORAL EVERY 6 HOURS PRN
OUTPATIENT
Start: 2024-09-12

## 2024-09-16 RX ORDER — TRAMADOL HYDROCHLORIDE 50 MG/1
50 TABLET ORAL EVERY 6 HOURS PRN
OUTPATIENT
Start: 2024-09-16

## 2024-09-18 ENCOUNTER — TREATMENT (OUTPATIENT)
Dept: PHYSICAL THERAPY | Facility: CLINIC | Age: 69
End: 2024-09-18
Payer: MEDICARE

## 2024-09-18 DIAGNOSIS — M25.562 ACUTE PAIN OF LEFT KNEE: ICD-10-CM

## 2024-09-18 DIAGNOSIS — M25.60 RANGE OF MOTION DEFICIT: ICD-10-CM

## 2024-09-18 DIAGNOSIS — Z96.652 STATUS POST LEFT KNEE REPLACEMENT: Primary | ICD-10-CM

## 2024-09-18 DIAGNOSIS — R53.1 WEAKNESS: ICD-10-CM

## 2024-10-07 ENCOUNTER — TREATMENT (OUTPATIENT)
Dept: PHYSICAL THERAPY | Facility: CLINIC | Age: 69
End: 2024-10-07
Payer: MEDICARE

## 2024-10-07 DIAGNOSIS — Z96.652 STATUS POST LEFT KNEE REPLACEMENT: Primary | ICD-10-CM

## 2024-10-07 DIAGNOSIS — R53.1 WEAKNESS: ICD-10-CM

## 2024-10-07 DIAGNOSIS — M25.562 ACUTE PAIN OF LEFT KNEE: ICD-10-CM

## 2024-10-07 DIAGNOSIS — M25.60 RANGE OF MOTION DEFICIT: ICD-10-CM

## 2024-10-07 PROCEDURE — 97110 THERAPEUTIC EXERCISES: CPT | Performed by: PHYSICAL THERAPIST

## 2024-10-07 PROCEDURE — 97140 MANUAL THERAPY 1/> REGIONS: CPT | Performed by: PHYSICAL THERAPIST

## 2024-10-07 PROCEDURE — 97530 THERAPEUTIC ACTIVITIES: CPT | Performed by: PHYSICAL THERAPIST

## 2024-10-07 NOTE — PROGRESS NOTES
Physical Therapy Daily Treatment Note  [facility] [place]  [address]  [place] [address]      Patient: Chava Santiago   : 1955  Referring practitioner: Rolando Woodruff MD  Date of Initial Visit: Type: THERAPY  Noted: 2024  Today's Date: 10/7/2024  Patient seen for 27 sessions       Visit Diagnoses:    ICD-10-CM ICD-9-CM   1. Status post left knee replacement  Z96.652 V43.65   2. Weakness  R53.1 780.79   3. Acute pain of left knee  M25.562 719.46   4. Range of motion deficit  M25.60 719.50       Subjective Evaluation    History of Present Illness  Mechanism of injury: The patient reported that he is still feeling fatigued with stair navigation at work and believes he needs to build more strength in the quads.  He has been very busy at work and has not been exercising much outside of the normal activity that he is getting with walking and standing.  He reported he is stretching when he gets time.  He continues to get some discomfort in the knee with prolonged activity but overall reports it is feeling better.    Pain  Current pain ratin  Location: L knee          Objective   See Exercise, Manual, and Modality Logs for complete treatment.       Assessment & Plan       Assessment  Assessment details: The patient continues to demonstrate weakness of the quads and slight limitation into knee flexion.  Quad weakness limits his tolerance to stair use and limited range of motion limits his ability to easily put on his shoes or squat to low positions.  He has been working long shifts and is on his feet throughout the duration of the shifts and has not had the stamina to consistently perform strengthening exercises.  He was educated on using the staircase to stretch into flexion and to strengthen the left quads while on the job.  He feels this is a reasonable way to exercise moving forward.  I would like to see his ability to navigate the stairs reciprocally improved before  discharge but feel he would be able to do this well within a few weeks if he is consistent with strengthening.    Plan  Plan details: Continue strengthening of the quads.  Range of motion exercises and manual therapy to improve knee flexion.          Timed:         Manual Therapy:    15     mins  08718;     Therapeutic Exercise:    15     mins  66310;     Neuromuscular Evelio:    0    mins  92283;    Therapeutic Activity:     30     mins  31022;     Gait Trainin     mins  56850;     Ultrasound:     0     mins  03912;    Ionto                               0    mins   23222  Self Care                       0     mins   93163  Canalith Repos    0     mins 65989      Un-Timed:  Electrical Stimulation:    0     mins  79756 ( );  Dry Needling     0     mins self-pay  Traction     0     mins 24213      Timed Treatment:   60   mins   Total Treatment:     60   mins    Carter Anna, PT  [place] License: [id number]

## 2024-10-13 DIAGNOSIS — K58.2 IRRITABLE BOWEL SYNDROME WITH BOTH CONSTIPATION AND DIARRHEA: ICD-10-CM

## 2024-10-15 RX ORDER — DICYCLOMINE HCL 20 MG
TABLET ORAL
Qty: 60 TABLET | Refills: 5 | Status: SHIPPED | OUTPATIENT
Start: 2024-10-15

## 2024-10-17 ENCOUNTER — TREATMENT (OUTPATIENT)
Dept: PHYSICAL THERAPY | Facility: CLINIC | Age: 69
End: 2024-10-17
Payer: MEDICARE

## 2024-10-17 DIAGNOSIS — M25.562 ACUTE PAIN OF LEFT KNEE: ICD-10-CM

## 2024-10-17 DIAGNOSIS — M25.60 RANGE OF MOTION DEFICIT: ICD-10-CM

## 2024-10-17 DIAGNOSIS — R53.1 WEAKNESS: ICD-10-CM

## 2024-10-17 DIAGNOSIS — Z96.652 STATUS POST LEFT KNEE REPLACEMENT: Primary | ICD-10-CM

## 2024-10-17 NOTE — PROGRESS NOTES
Physical Therapy Daily Treatment Note  [facility] [place]  [address]  [place] [address]      Patient: Chava Santiago   : 1955  Referring practitioner: Rolando Woodruff MD  Date of Initial Visit: Type: THERAPY  Noted: 2024  Today's Date: 10/17/2024  Patient seen for 28 sessions       Visit Diagnoses:    ICD-10-CM ICD-9-CM   1. Status post left knee replacement  Z96.652 V43.65   2. Weakness  R53.1 780.79   3. Acute pain of left knee  M25.562 719.46   4. Range of motion deficit  M25.60 719.50       Subjective Evaluation    History of Present Illness  Mechanism of injury: The patient stated that his knee has continued to improve, though he continues to experience good days and bad days.  He is on his feet most of the day while at work and reported that his knee gets achy with prolonged walking and stair use.  He has been trying to stretch his knee on the stairs a bit more frequently.  He requested advice on stretching his hamstrings.    Pain  Current pain ratin  Location: Left knee           Objective          Active Range of Motion   Left Knee   Flexion: 116 degrees   Extension: 0 degrees       See Exercise, Manual, and Modality Logs for complete treatment.       Assessment & Plan       Assessment  Assessment details: The patient had a long rehab process s/p left TKA secondary to slow return and active range of motion into both flexion and extension.  He demonstrated 116 degrees of flexion upon presentation today and this quickly improved to 122 degrees after a brief period of stretching.  I anticipate this will continue to get better if he remains consistent with his independent stretching and several additional stretching positions were reviewed in the clinic today.  He is functioning well overall and has returned to work at full duty.  He demonstrated the ability to navigate the stairs reciprocally today without use of a handrail and this should get easier as he builds  more strength in the quads.  He had difficulty with lunging and multidirectional stepping today secondary to fatigue and was instructed to continue quad strengthening independently.  His home exercise program was updated several weeks ago to include these exercises and I anticipate they will be challenging for many months to calm.  At this time he has met his goals for PT and will benefit from transition to independent management.  His chart will be left open for 30 days in the event he has any setbacks or wishes to return to PT.    Goals  Plan Goals: Short Term Goals (4 weeks):     1. The patient will be independent and compliant with initial HEP.  Met    2. The patient will report pain at rest 3/10 or less and worst pain 6/10 or less.  Met    3. The patient will display decreased TTP in the L knee jt line and dec mm tension in the surrounding musculature.  Met    4.  L knee AROM will improve to ext/flex 0/115 deg.  Met    5. The patient will demonstrate inc strength evidenced by MMT as follows: hip abd 4+/5, hip ext 4+/5, knee ext 4+/5, and knee flex 4+/5.  Met    6. LEFS will improve by 9 points or more.  Not assessed today    7. The pt will ambulate 200 feet with no AD and gait pattern free of apparent deviations.  Met        Long Term Goals (8 weeks):     1. The patient will be appropriate for independent management and compliant with progressed HEP.  Met    2. The patient will report pain at rest 1/10 or less and worst pain 3/10 or less.  Met    3. The patient will display L knee AROM 0/120.  Met    4. The patient will demonstrate good VMO activation evidenced by performance of a SLR with 3# and no extensor lag.  Met    5. The patient will return to work duties and/or ADLs with no limitations due to knee pain or dysfunction.  Partially met    6. The patient will return to recreational and community activities with no limitations due to knee pain or dysfunction.  Met      Plan  Plan details: Pt to attempt  independent management and call for f/u as needed. If no additional visits are scheduled in 30 days this will serve as a d/c note.            Timed:         Manual Therapy:    8     mins  41113;     Therapeutic Exercise:    30     mins  51214;     Neuromuscular Evelio:    0    mins  46739;    Therapeutic Activity:     0     mins  67352;     Gait Trainin     mins  66984;     Ultrasound:     0     mins  31563;    Ionto                               0    mins   51986  Self Care                       0     mins   16021  Canalith Repos    0     mins 80137      Un-Timed:  Electrical Stimulation:    0     mins  78233 ( );  Dry Needling     0     mins self-pay  Traction     0     mins 29833      Timed Treatment:   38   mins   Total Treatment:     38   mins    Carter Anna PT  [place] License: [id number]

## 2024-12-02 ENCOUNTER — OFFICE VISIT (OUTPATIENT)
Dept: ORTHOPEDIC SURGERY | Facility: CLINIC | Age: 69
End: 2024-12-02
Payer: MEDICARE

## 2024-12-02 VITALS
HEIGHT: 71 IN | DIASTOLIC BLOOD PRESSURE: 88 MMHG | SYSTOLIC BLOOD PRESSURE: 140 MMHG | WEIGHT: 230 LBS | BODY MASS INDEX: 32.2 KG/M2

## 2024-12-02 DIAGNOSIS — M65.341 TRIGGER RING FINGER OF RIGHT HAND: Primary | ICD-10-CM

## 2024-12-02 PROCEDURE — 99213 OFFICE O/P EST LOW 20 MIN: CPT | Performed by: STUDENT IN AN ORGANIZED HEALTH CARE EDUCATION/TRAINING PROGRAM

## 2024-12-02 PROCEDURE — 20550 NJX 1 TENDON SHEATH/LIGAMENT: CPT | Performed by: STUDENT IN AN ORGANIZED HEALTH CARE EDUCATION/TRAINING PROGRAM

## 2024-12-02 PROCEDURE — 1160F RVW MEDS BY RX/DR IN RCRD: CPT | Performed by: STUDENT IN AN ORGANIZED HEALTH CARE EDUCATION/TRAINING PROGRAM

## 2024-12-02 PROCEDURE — 1159F MED LIST DOCD IN RCRD: CPT | Performed by: STUDENT IN AN ORGANIZED HEALTH CARE EDUCATION/TRAINING PROGRAM

## 2024-12-02 PROCEDURE — 3077F SYST BP >= 140 MM HG: CPT | Performed by: STUDENT IN AN ORGANIZED HEALTH CARE EDUCATION/TRAINING PROGRAM

## 2024-12-02 PROCEDURE — 3079F DIAST BP 80-89 MM HG: CPT | Performed by: STUDENT IN AN ORGANIZED HEALTH CARE EDUCATION/TRAINING PROGRAM

## 2024-12-02 RX ORDER — LIDOCAINE HYDROCHLORIDE 10 MG/ML
0.5 INJECTION, SOLUTION INFILTRATION; PERINEURAL
Status: COMPLETED | OUTPATIENT
Start: 2024-12-02 | End: 2024-12-02

## 2024-12-02 RX ORDER — TRIAMCINOLONE ACETONIDE 40 MG/ML
20 INJECTION, SUSPENSION INTRA-ARTICULAR; INTRAMUSCULAR
Status: COMPLETED | OUTPATIENT
Start: 2024-12-02 | End: 2024-12-02

## 2024-12-02 RX ADMIN — TRIAMCINOLONE ACETONIDE 20 MG: 40 INJECTION, SUSPENSION INTRA-ARTICULAR; INTRAMUSCULAR at 15:05

## 2024-12-02 RX ADMIN — LIDOCAINE HYDROCHLORIDE 0.5 ML: 10 INJECTION, SOLUTION INFILTRATION; PERINEURAL at 15:05

## 2024-12-02 NOTE — PROGRESS NOTES
Procedure   - Hand/Upper Extremity Injection: R ring A1 for trigger finger on 12/2/2024 3:05 PM  Indications: pain  Details: 27 G needle, volar approach  Medications: 0.5 mL lidocaine 1 %; 20 mg triamcinolone acetonide 40 MG/ML  Outcome: tolerated well, no immediate complications  Procedure, treatment alternatives, risks and benefits explained, specific risks discussed. Consent was given by the patient. Immediately prior to procedure a time out was called to verify the correct patient, procedure, equipment, support staff and site/side marked as required. Patient was prepped and draped in the usual sterile fashion.

## 2024-12-02 NOTE — PROGRESS NOTES
"                                                                 [facility]     Office Visit       Date: 12/02/2024   Patient Name: Chava Santiago  MRN: [id number]  YOB: 1955    Referring Physician: No ref. provider found     Chief Complaint:   Chief Complaint   Patient presents with    Follow-up     4.5 month follow-up: Trigger ring finger of right hand      History of Present Illness:   Chava Santiago is a 69 y.o. male RHD presenting to clinic for follow up of left small and ring trigger fingers.  At his last clinic visit he received corticosteroid injections.  He reports that the injections to his left small and ring fingers improved his symptoms.  He now complains of right ring finger catching, locking, and pain.  This is rated 4/10 when it occurs.  This requires manual straightening.  There is also stiffness noted to the PIP joint.  He is most interested in injection today.  He denies any numbness or tingling to the hands.  No other complaints or concerns.    Subjective   Review of Systems:   Review of Systems   Constitutional: Negative.    HENT: Negative.     Eyes: Negative.    Respiratory: Negative.     Cardiovascular: Negative.    Gastrointestinal: Negative.    Endocrine: Negative.    Genitourinary: Negative.    Musculoskeletal:  Positive for arthralgias.   Skin: Negative.    Allergic/Immunologic: Negative.    Neurological: Negative.    Hematological: Negative.    Psychiatric/Behavioral: Negative.        Pertinent review of systems per HPI    I reviewed the patient's chief complaint, history of present illness, review of systems, past medical history, surgical history, family history, social history, medications and allergy list in the EMR on 12/02/2024 and agree with the findings above.    Objective    Vital Signs:   Vitals:    12/02/24 1435   BP: 140/88   Weight: 104 kg (230 lb)   Height: 180.3 cm (71\")     BMI:      General: No acute distress. Alert and oriented. "   Cardiovascular: Palpable radial pulse.   Respiratory: Breathing is nonlabored.     Ortho Exam:  Examination of the right upper extremity demonstrates no obvious deformity.  No skin lesions or abrasions.  He is tender overlying the ring finger A1 pulley.  There is catching and locking during examination.  The remainder the hand wrist and digits are nontender.  Negative Tinel, Durkan's, Phalen's at the wrist.  Sensations intact light touch of the median and ulnar distribution of the hand.  Warm and well-perfused distally.    Imaging / Studies:    Imaging Results (Last 24 Hours)       ** No results found for the last 24 hours. **        EMG nerve conduction study performed on 2/10/2022 demonstrates severe bilateral carpal tunnel.    Procedure Note:  After reviewing the risks, benefits and alternatives to a steroid injection, which include but are not limited to; hypopigmentation, fat necrosis/atrophy, pain, swelling, bleeding, bruising, damage to nearby nerves/vessels, allergic reaction , transient elevation in blood glucose levels and infection a verbal consent was obtained. A time-out was then performed and the affected hand was prepped with chlorhexadine soap and ethyl chloride was used to numb the skin. The right ring finger flexor tendon sheath was injected with 0.5cc: 0.5cc mixture of Kenalog - 40 mg/ml and Lidocaine - 1% / 2 ml. The injection was well tolerated and a sterile dressing was applied. There were no complications. I advised the patient that they might experience some local discomfort for the next couple days and can apply ice to the site as needed.    Assessment / Plan    Assessment/Plan:   Chava Santiago is a 69 y.o. male with right ring trigger finger.    The patient was provided a corticosteroid injection into the right ring finger flexor tendon sheath today.  This is tolerated well.  Advised him on gentle stretching exercises and nighttime Coban wrapping for the next 2 weeks.  Additionally,  the patient denies any numbness or tingling of the hands, however he does have a prior EMG that demonstrates severe bilateral carpal tunnel syndrome.  Will continue to monitor for any symptomatic numbness and tingling.  The patient expressed understanding.  All questions and concerns were addressed.      ICD-10-CM ICD-9-CM   1. Trigger ring finger of right hand  M65.341 727.03     Follow Up:   Return in about 3 months (around 3/2/2025) for Follow Up.      Mary Ann Genao MD  [facility] Hand Surgeon

## 2025-01-13 ENCOUNTER — OFFICE VISIT (OUTPATIENT)
Dept: FAMILY MEDICINE CLINIC | Facility: CLINIC | Age: 70
End: 2025-01-13
Payer: MEDICARE

## 2025-01-13 VITALS
BODY MASS INDEX: 32.08 KG/M2 | TEMPERATURE: 98 F | HEART RATE: 80 BPM | OXYGEN SATURATION: 96 % | HEIGHT: 71 IN | DIASTOLIC BLOOD PRESSURE: 80 MMHG | SYSTOLIC BLOOD PRESSURE: 120 MMHG | RESPIRATION RATE: 16 BRPM

## 2025-01-13 DIAGNOSIS — I46.9 CARDIAC ARREST WITH VENTRICULAR FIBRILLATION: ICD-10-CM

## 2025-01-13 DIAGNOSIS — I49.01 CARDIAC ARREST WITH VENTRICULAR FIBRILLATION: ICD-10-CM

## 2025-01-13 DIAGNOSIS — I48.19 OTHER PERSISTENT ATRIAL FIBRILLATION: ICD-10-CM

## 2025-01-13 DIAGNOSIS — I50.22 HEART FAILURE WITH MILDLY REDUCED EJECTION FRACTION (HFMREF): ICD-10-CM

## 2025-01-13 DIAGNOSIS — I21.02 STEMI INVOLVING LEFT ANTERIOR DESCENDING CORONARY ARTERY: Primary | ICD-10-CM

## 2025-01-13 PROCEDURE — 1125F AMNT PAIN NOTED PAIN PRSNT: CPT | Performed by: FAMILY MEDICINE

## 2025-01-13 PROCEDURE — 3079F DIAST BP 80-89 MM HG: CPT | Performed by: FAMILY MEDICINE

## 2025-01-13 PROCEDURE — 3074F SYST BP LT 130 MM HG: CPT | Performed by: FAMILY MEDICINE

## 2025-01-13 PROCEDURE — 99214 OFFICE O/P EST MOD 30 MIN: CPT | Performed by: FAMILY MEDICINE

## 2025-01-13 RX ORDER — PANTOPRAZOLE SODIUM 40 MG/1
40 TABLET, DELAYED RELEASE ORAL DAILY
COMMUNITY

## 2025-01-13 RX ORDER — FUROSEMIDE 40 MG/1
40 TABLET ORAL EVERY OTHER DAY
COMMUNITY

## 2025-01-13 RX ORDER — FOLIC ACID 1 MG/1
1 TABLET ORAL DAILY
COMMUNITY

## 2025-01-13 RX ORDER — METOPROLOL SUCCINATE 25 MG/1
25 TABLET, EXTENDED RELEASE ORAL DAILY
COMMUNITY

## 2025-01-13 RX ORDER — SACUBITRIL AND VALSARTAN 49; 51 MG/1; MG/1
1 TABLET, FILM COATED ORAL 2 TIMES DAILY
COMMUNITY

## 2025-01-13 RX ORDER — ROSUVASTATIN CALCIUM 20 MG/1
20 TABLET, COATED ORAL DAILY
COMMUNITY

## 2025-01-13 RX ORDER — SPIRONOLACTONE 25 MG/1
25 TABLET ORAL DAILY
COMMUNITY

## 2025-01-13 RX ORDER — EZETIMIBE 10 MG/1
10 TABLET ORAL DAILY
COMMUNITY

## 2025-01-13 RX ORDER — CLOPIDOGREL BISULFATE 75 MG/1
75 TABLET ORAL DAILY
COMMUNITY

## 2025-02-28 RX ORDER — FOLIC ACID 1 MG/1
1 TABLET ORAL DAILY
Qty: 90 TABLET | Refills: 1 | Status: SHIPPED | OUTPATIENT
Start: 2025-02-28

## 2025-02-28 RX ORDER — FUROSEMIDE 40 MG/1
40 TABLET ORAL EVERY OTHER DAY
Qty: 45 TABLET | Refills: 1 | Status: SHIPPED | OUTPATIENT
Start: 2025-02-28

## 2025-02-28 NOTE — TELEPHONE ENCOUNTER
Caller: [facility] [place] [address] [id number] - [phone]  - [phone]    Relationship: Pharmacy    Best call back number: [phone]     Requested Prescriptions:   Requested Prescriptions     Pending Prescriptions Disp Refills    folic acid (FOLVITE) 1 MG tablet       Sig: Take 1 tablet by mouth Daily.    furosemide (LASIX) 40 MG tablet       Sig: Take 1 tablet by mouth Every Other Day.        Pharmacy where request should be sent: [facility] [place] [address] [email or web address] - [phone]  - [phone] FX     Last office visit with prescribing clinician: 1/13/2025   Last telemedicine visit with prescribing clinician: Visit date not found   Next office visit with prescribing clinician: 3/11/2025     Additional details provided by patient: PHARMACY HAS BEEN TRYING TO GET THIS SINCE TUESDAY. PLEASE SEND REFILLS TODAY - ALSO NEEDS VITAMIN D 100 MG ONCE PER DAY     Does the patient have less than a 3 day supply:  [x] Yes  [] No    Would you like a call back once the refill request has been completed: [] Yes [x] No    If the office needs to give you a call back, can they leave a voicemail: [] Yes [x] No    Ekaterina Cavanaugh MA   02/28/25 09:19 EST

## 2025-03-03 RX ORDER — ALLOPURINOL 300 MG/1
300 TABLET ORAL DAILY
Qty: 90 TABLET | Refills: 1 | Status: SHIPPED | OUTPATIENT
Start: 2025-03-03

## 2025-03-11 ENCOUNTER — OFFICE VISIT (OUTPATIENT)
Dept: FAMILY MEDICINE CLINIC | Facility: CLINIC | Age: 70
End: 2025-03-11
Payer: MEDICARE

## 2025-03-11 VITALS
DIASTOLIC BLOOD PRESSURE: 80 MMHG | RESPIRATION RATE: 16 BRPM | BODY MASS INDEX: 30.27 KG/M2 | OXYGEN SATURATION: 92 % | WEIGHT: 216.2 LBS | SYSTOLIC BLOOD PRESSURE: 130 MMHG | TEMPERATURE: 97.7 F | HEIGHT: 71 IN | HEART RATE: 83 BPM

## 2025-03-11 DIAGNOSIS — I10 ESSENTIAL HYPERTENSION, BENIGN: ICD-10-CM

## 2025-03-11 DIAGNOSIS — I21.02 STEMI INVOLVING LEFT ANTERIOR DESCENDING CORONARY ARTERY: ICD-10-CM

## 2025-03-11 DIAGNOSIS — I50.22 HEART FAILURE WITH MILDLY REDUCED EJECTION FRACTION (HFMREF): ICD-10-CM

## 2025-03-11 DIAGNOSIS — M10.071 ACUTE IDIOPATHIC GOUT OF RIGHT FOOT: ICD-10-CM

## 2025-03-11 DIAGNOSIS — E78.2 MIXED HYPERLIPIDEMIA: ICD-10-CM

## 2025-03-11 DIAGNOSIS — Z12.11 COLON CANCER SCREENING: Primary | ICD-10-CM

## 2025-03-11 DIAGNOSIS — I49.01 CARDIAC ARREST WITH VENTRICULAR FIBRILLATION: ICD-10-CM

## 2025-03-11 DIAGNOSIS — M17.12 PRIMARY OSTEOARTHRITIS OF LEFT KNEE: ICD-10-CM

## 2025-03-11 DIAGNOSIS — Z00.00 MEDICARE ANNUAL WELLNESS VISIT, INITIAL: ICD-10-CM

## 2025-03-11 DIAGNOSIS — I48.19 OTHER PERSISTENT ATRIAL FIBRILLATION: ICD-10-CM

## 2025-03-11 DIAGNOSIS — I46.9 CARDIAC ARREST WITH VENTRICULAR FIBRILLATION: ICD-10-CM

## 2025-03-11 NOTE — PROGRESS NOTES
The ABCs of the Annual Wellness Visit  Initial Medicare Wellness Visit    Chief Complaint   Patient presents with    Medicare Wellness-Initial Visit     Subjective   History of Present Illness:  Chava Santiago is a 69 y.o. male who presents for an Initial Medicare Wellness Visit.  History of Present Illness  The patient is a 69-year-old male who is here today for his initial Medicare wellness visit. This is also somewhat a follow-up after his cardiac arrest and resuscitation, subsequent heart catheterization and stent placement, all performed at the [facility]. He is participating in cardiac rehabilitation.    He reports overall good health but has been experiencing mild chest discomfort for several days, the cause of which remains unclear to him. He does not engage in any heavy lifting activities. His cardiac rehabilitation is progressing well, initially attending 3 sessions per week for 2 weeks, and now continuing on Mondays and Wednesdays. Despite this, he experiences shortness of breath during certain activities, which he finds frustrating as he was able to perform these tasks prior to his cardiac event. His cardiologist is Ana SANDOVAL at , with whom he has a scheduled follow-up in 06/2025. His medication regimen remains unchanged.    He has been under the care of Dr. Rodriguez, a podiatrist, for foot-related issues. Dr. Rodriguez recommended wider footwear and provided an insole, which has significantly improved his condition. He is currently wearing compression socks.    The following portions of the patient's history were reviewed and   updated as appropriate: allergies, current medications, past family history, past medical history, past social history, past surgical history, and problem list.    Compared to one year ago, the patient feels his physical   health is worse.    Compared to one year ago, the patient feels his mental   health is the same.    Recent Hospitalizations:  He was  admitted within the past 365 days at [facility].       Current Medical Providers:  Patient Care Team:  Ciaran Bangura MD as PCP - General  Ciaran Bangura MD    Outpatient Medications Prior to Visit   Medication Sig Dispense Refill    allopurinol (ZYLOPRIM) 300 MG tablet TAKE 1 TABLET BY MOUTH DAILY 90 tablet 1    clopidogrel (PLAVIX) 75 MG tablet Take 1 tablet by mouth Daily.      ezetimibe (Zetia) 10 MG tablet Take 1 tablet by mouth Daily.      folic acid (FOLVITE) 1 MG tablet Take 1 tablet by mouth Daily. 90 tablet 1    furosemide (LASIX) 40 MG tablet Take 1 tablet by mouth Every Other Day. 45 tablet 1    metoprolol succinate XL (TOPROL-XL) 25 MG 24 hr tablet Take 1 tablet by mouth Daily.      ondansetron (Zofran) 4 MG tablet Take 1 tablet by mouth Every 8 (Eight) Hours As Needed for Nausea or Vomiting. 10 tablet 1    pantoprazole (PROTONIX) 40 MG EC tablet Take 1 tablet by mouth Daily.      prednisoLONE acetate (PRED FORTE) 1 % ophthalmic suspension       rosuvastatin (CRESTOR) 20 MG tablet Take 1 tablet by mouth Daily.      sacubitril-valsartan (Entresto) 49-51 MG tablet Take 1 tablet by mouth 2 (Two) Times a Day.      spironolactone (ALDACTONE) 25 MG tablet Take 1 tablet by mouth Daily.      traMADol (ULTRAM) 50 MG tablet Take 1 tablet by mouth Every 6 (Six) Hours As Needed for Moderate Pain.       No facility-administered medications prior to visit.       Opioid medication/s are on active medication list.  and I have evaluated his active treatment plan and pain score trends (see table).  Vitals:    03/11/25 1305   PainSc: 0-No pain     I have reviewed the chart for potential of high risk medication and harmful drug interactions in the elderly.          Aspirin is not on active medication list.  Aspirin use is not indicated based on review of current medical condition/s. Risk of harm outweighs potential benefits.  .    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 Patient has an advance directive in EMR which is still valid.     Review of Systems      Objective    Vitals:    25 1305   BP: 130/80   Pulse: 83   Resp: 16   Temp: 97.7 °F (36.5 °C)   SpO2: 92%   Weight: 98.1 kg (216 lb 3.2 oz)   Height: 180.3 cm (71\")   PainSc: 0-No pain     BMI Readings from Last 1 Encounters:   25 30.15 kg/m²   BMI is within normal parameters. No follow-up required.  BMI Readings from Last 1 Encounters:   25 30.15 kg/m²   BMI is above normal parameters. Recommendations include: exercise counseling and nutrition counseling    Does the patient have evidence of cognitive impairment? No    Physical Exam  Physical Exam  Lungs are clear. No fluid or crackling.  Heart sounds normal.  No lumps in the breasts.    Lab Results   Component Value Date    HGBA1C 5.6 2024     Results           HEALTH RISK ASSESSMENT    Smoking Status:  Social History     Tobacco Use   Smoking Status Former    Current packs/day: 0.00    Types: Cigarettes    Quit date: 1983    Years since quittin.2   Smokeless Tobacco " Never     Alcohol Consumption:  Social History     Substance and Sexual Activity   Alcohol Use Not Currently    Comment: occ     Fall Risk Screen:    NATALIA Fall Risk Assessment was completed, and patient is at LOW risk for falls.Assessment completed on:3/11/2025    Depression Screen:       3/11/2025     1:10 PM   PHQ-2/PHQ-9 Depression Screening   Little interest or pleasure in doing things Not at all   Feeling down, depressed, or hopeless Not at all       Health Habits and Functional and Cognitive Screening:      3/4/2025     2:46 PM   Functional & Cognitive Status   Do you have difficulty preparing food and eating? No   Do you have difficulty bathing yourself, getting dressed or grooming yourself? No   Do you have difficulty using the toilet? No   Do you have difficulty moving around from place to place? No   Do you have trouble with steps or getting out of a bed or a chair? No   Current Diet Well Balanced Diet   Dental Exam Up to date   Eye Exam Up to date   Exercise (times per week) 3 times per week   Current Exercises Include Cardiovascular Workout;House Cleaning;Stationary Bicycling/Spin Class;Treadmill;Walking   Do you need help using the phone?  No   Are you deaf or do you have serious difficulty hearing?  No   Do you need help to go to places out of walking distance? No   Do you need help shopping? No   Do you need help preparing meals?  No   Do you need help with housework?  No   Do you need help with laundry? No   Do you need help taking your medications? No   Do you need help managing money? No   Do you ever drive or ride in a car without wearing a seat belt? No   Have you felt unusual stress, anger or loneliness in the last month? No   Who do you live with? Alone   If you need help, do you have trouble finding someone available to you? No   Have you been bothered in the last four weeks by sexual problems? No   Do you have difficulty concentrating, remembering or making decisions? No        Age-appropriate Screening Schedule:  Refer to the list below for future screening recommendations based on patient's age, sex and/or medical conditions. Orders for these recommended tests are listed in the plan section. The patient has been provided with a written plan.    Health Maintenance   Topic Date Due    Pneumococcal Vaccine 50+ (1 of 1 - PCV) Never done    AAA SCREEN ONCE  Never done    COLORECTAL CANCER SCREENING  07/13/2020    INFLUENZA VACCINE  07/01/2024    ZOSTER VACCINE (1 of 2) 03/11/2025 (Originally 11/24/2003)    COVID-19 Vaccine (3 - 2024-25 season) 04/04/2025 (Originally 9/1/2024)    BMI FOLLOWUP  05/06/2025    LIPID PANEL  12/21/2025    ANNUAL WELLNESS VISIT  03/11/2026    TDAP/TD VACCINES (2 - Td or Tdap) 02/02/2027    HEPATITIS C SCREENING  Completed            Assessment & Plan   Assessment & Plan  1. Initial Medicare wellness visit.  His pulmonary function is satisfactory, with no evidence of fluid accumulation or crackles. Cardiac function is also within normal limits. He has not undergone a colonoscopy since 2010. A Cologuard test will be ordered for him.    2. Post-cardiac arrest and stent placement follow-up.  He is participating in cardiac rehabilitation and reports some improvement. He experiences occasional shortness of breath during activities that previously did not cause him to be winded. He is under the care of Dr. Dorsey, a cardiologist at , with a follow-up scheduled in June. No changes in medications have been made recently.    3. Foot discomfort.  He consulted Dr. Rodriguez, a podiatrist, who recommended wider shoes and provided an insole, which has helped alleviate his symptoms.    Follow-up  The patient will follow up in 6 months.    PROCEDURE  The patient underwent heart catheterization and stent placement at the [facility].    CMS Preventative Services Quick Reference  Risk Factors Identified During Encounter  Immunizations Discussed/Encouraged:  Declines  immunizations  The above risks/problems have been discussed with the patient.  Follow up actions/plans if indicated are seen below in the Assessment/Plan Section.  Pertinent information has been shared with the patient in the After Visit Summary.    Diagnoses and all orders for this visit:    1. Colon cancer screening (Primary)  -     Cologuard - Stool, Per Rectum; Future    2. Medicare annual wellness visit, initial    3. Essential hypertension, benign    4. Mixed hyperlipidemia    5. STEMI involving left anterior descending coronary artery    6. Cardiac arrest with ventricular fibrillation    7. Other persistent atrial fibrillation    8. Heart failure with mildly reduced ejection fraction (HFmrEF)    9. Primary osteoarthritis of left knee    10. Acute idiopathic gout of right foot        Follow Up:  Return in about 6 months (around 9/11/2025) for Recheck.     An After Visit Summary and PPPS were given to the patient.

## 2025-04-08 ENCOUNTER — RESULTS FOLLOW-UP (OUTPATIENT)
Dept: FAMILY MEDICINE CLINIC | Facility: CLINIC | Age: 70
End: 2025-04-08
Payer: MEDICARE

## 2025-05-01 ENCOUNTER — OFFICE VISIT (OUTPATIENT)
Dept: ORTHOPEDIC SURGERY | Facility: CLINIC | Age: 70
End: 2025-05-01
Payer: MEDICARE

## 2025-05-01 VITALS — HEIGHT: 71 IN | BODY MASS INDEX: 30.1 KG/M2 | WEIGHT: 215 LBS

## 2025-05-01 DIAGNOSIS — M65.322 TRIGGER INDEX FINGER OF LEFT HAND: ICD-10-CM

## 2025-05-01 DIAGNOSIS — M65.342 TRIGGER RING FINGER OF LEFT HAND: Primary | ICD-10-CM

## 2025-05-01 RX ORDER — APIXABAN 5 MG/1
TABLET, FILM COATED ORAL
COMMUNITY
Start: 2025-03-24

## 2025-05-01 RX ORDER — TRIAMCINOLONE ACETONIDE 40 MG/ML
20 INJECTION, SUSPENSION INTRA-ARTICULAR; INTRAMUSCULAR
Status: COMPLETED | OUTPATIENT
Start: 2025-05-01 | End: 2025-05-01

## 2025-05-01 RX ORDER — LIDOCAINE HYDROCHLORIDE 10 MG/ML
0.5 INJECTION, SOLUTION EPIDURAL; INFILTRATION; INTRACAUDAL; PERINEURAL
Status: COMPLETED | OUTPATIENT
Start: 2025-05-01 | End: 2025-05-01

## 2025-05-01 RX ADMIN — LIDOCAINE HYDROCHLORIDE 0.5 ML: 10 INJECTION, SOLUTION EPIDURAL; INFILTRATION; INTRACAUDAL; PERINEURAL at 13:21

## 2025-05-01 RX ADMIN — TRIAMCINOLONE ACETONIDE 20 MG: 40 INJECTION, SUSPENSION INTRA-ARTICULAR; INTRAMUSCULAR at 13:21

## 2025-05-01 NOTE — PROGRESS NOTES
Procedure   - Hand/Upper Extremity Injection: L ring A1 for trigger finger on 5/1/2025 1:21 PM  Indications: pain  Details: 27 G needle, volar approach  Medications: 20 mg triamcinolone acetonide 40 MG/ML; 0.5 mL lidocaine PF 1% 1 %  Outcome: tolerated well, no immediate complications  Procedure, treatment alternatives, risks and benefits explained, specific risks discussed. Consent was given by the patient. Immediately prior to procedure a time out was called to verify the correct patient, procedure, equipment, support staff and site/side marked as required. Patient was prepped and draped in the usual sterile fashion.

## 2025-05-01 NOTE — PROGRESS NOTES
[facility] Orthopedic     Office Visit       Date: 05/01/2025   Patient Name: Chava Santiago  MRN: [id number]  YOB: 1955    Referring Physician: No ref. provider found     Chief Complaint:   Chief Complaint   Patient presents with    Follow-up     5 MONTH FOLLOW UP -Trigger ring finger of right hand      History of Present Illness:   Chava Santiago is a 69 y.o. male right-hand-dominant presented clinic for follow-up of right ring trigger finger.  The patient received a corticosteroid injection at his last clinic visit.  He reports his right ring finger has done well after his last injection and he now complains of left ring and index finger pain, catching and locking.  Symptoms began 2 days ago.  He has had a prior injection into the left ring finger.  He denies any other symptoms.    Subjective   Review of Systems:   Review of Systems   Constitutional:  Negative for chills, fever, unexpected weight gain and unexpected weight loss.   HENT:  Negative for congestion, postnasal drip and rhinorrhea.    Eyes:  Negative for blurred vision.   Respiratory:  Negative for shortness of breath.    Cardiovascular:  Negative for leg swelling.   Gastrointestinal:  Negative for abdominal pain, nausea and vomiting.   Genitourinary:  Negative for difficulty urinating.   Musculoskeletal:  Positive for arthralgias. Negative for gait problem, joint swelling and myalgias.   Skin:  Negative for skin lesions and wound.   Neurological:  Negative for dizziness, weakness, light-headedness and numbness.   Hematological:  Does not bruise/bleed easily.   Psychiatric/Behavioral:  Negative for depressed mood.       Pertinent review of systems per HPI    I reviewed the patient's chief complaint, history of present illness, review of systems, past medical history, surgical history, family history, social history, medications and allergy list in the EMR on  "05/01/2025 and agree with the findings above.    Objective    Vital Signs:   Vitals:    05/01/25 1258   Weight: 97.5 kg (215 lb)   Height: 180.3 cm (71\")     General: No acute distress. Alert and oriented.   Cardiovascular: Palpable radial pulse.   Respiratory: Breathing is nonlabored.   Ortho Exam:  Examination of the left upper extremity demonstrates a healed carpal tunnel incision.  No skin lesions or abrasions.  He is tender over the A1 pulleys of the index and ring fingers.  There is catching and locking of the ring finger during examination.  No catching of the index finger.  He is able to make composite fist.  Sensations intact light touch of the hand.  Warm and well-perfused distally.    Imaging / Studies:    Imaging Results (Last 24 Hours)       ** No results found for the last 24 hours. **        EMG nerve conduction study performed on 2/10/2022 demonstrates severe bilateral carpal tunnel.     Procedure Note:  After reviewing the risks, benefits and alternatives to a steroid injection, which include but are not limited to; hypopigmentation, fat necrosis/atrophy, pain, swelling, bleeding, bruising, damage to nearby nerves/vessels, allergic reaction , transient elevation in blood glucose levels and infection a verbal consent was obtained. A time-out was then performed and the affected hand was prepped with chlorhexadine soap and ethyl chloride was used to numb the skin. The left index and ring finger flexor tendon sheaths were injected with 0.5cc: 0.5cc mixture of Kenalog - 40 mg/ml and Lidocaine - 1% / 2 ml. The injection was well tolerated and a sterile dressing was applied. There were no complications. I advised the patient that they might experience some local discomfort for the next couple days and can apply ice to the site as needed.    Assessment / Plan    Assessment/Plan:   Chava Santiago is a 69 y.o. male with left index and ring trigger finger    The patient currently has symptomatic left index " and ring trigger fingers.  He has had one prior injection into the left ring finger.  We discussed her options moving forward including anti-inflammatories, stretching, therapy, Coban wrapping, and corticosteroid injection.  Operative treatment in the form of A1 pulley release was also discussed.  The patient would like to proceed with corticosteroid injection into the left index and ring fingers today.  This provided tolerated well.  I will see him back as needed symptoms recur.  All question concerns were addressed.  He is agreeable.      ICD-10-CM ICD-9-CM   1. Trigger ring finger of left hand  M65.342 727.03   2. Trigger index finger of left hand  M65.322 727.03     Follow Up:   Return if symptoms worsen or fail to improve.      Mary Ann Genao MD  [facility] Orthopedic & Hand Surgeon

## 2025-05-01 NOTE — PROGRESS NOTES
Procedure   - Hand/Upper Extremity Injection: L index A1 for trigger finger on 5/1/2025 1:21 PM  Indications: pain  Details: 27 G needle, volar approach  Medications: 20 mg triamcinolone acetonide 40 MG/ML; 0.5 mL lidocaine PF 1% 1 %  Outcome: tolerated well, no immediate complications  Procedure, treatment alternatives, risks and benefits explained, specific risks discussed. Consent was given by the patient. Immediately prior to procedure a time out was called to verify the correct patient, procedure, equipment, support staff and site/side marked as required. Patient was prepped and draped in the usual sterile fashion.

## 2025-05-19 ENCOUNTER — TELEPHONE (OUTPATIENT)
Dept: FAMILY MEDICINE CLINIC | Facility: CLINIC | Age: 70
End: 2025-05-19
Payer: MEDICARE

## 2025-05-19 NOTE — TELEPHONE ENCOUNTER
"Caller: Chava Santiago \"Amador\"    Relationship: Self    Best call back number: [phone]     What medication are you requesting: Eliquis 5 MG tablet tablet AND sacubitril-valsartan (Entresto) 49-51 MG tablet     If a prescription is needed, what is your preferred pharmacy and phone number: [facility] - [place], [place] - [id number] - [phone] [facility] [phone]      Additional notes: PATIENT IS RUNNING OUT, AND NEEDS A REFILL PRIOR TO SEEING HIS NEW CARDIOLOGIST AT THE END OF 7.2025    "

## 2025-05-20 RX ORDER — SACUBITRIL AND VALSARTAN 49; 51 MG/1; MG/1
1 TABLET, FILM COATED ORAL 2 TIMES DAILY
Qty: 60 TABLET | Refills: 2 | Status: SHIPPED | OUTPATIENT
Start: 2025-05-20

## 2025-05-20 RX ORDER — APIXABAN 5 MG/1
5 TABLET, FILM COATED ORAL EVERY 12 HOURS SCHEDULED
Qty: 60 TABLET | Refills: 2 | Status: SHIPPED | OUTPATIENT
Start: 2025-05-20

## 2025-05-29 ENCOUNTER — OFFICE VISIT (OUTPATIENT)
Dept: ORTHOPEDIC SURGERY | Facility: CLINIC | Age: 70
End: 2025-05-29
Payer: MEDICARE

## 2025-05-29 VITALS
SYSTOLIC BLOOD PRESSURE: 114 MMHG | DIASTOLIC BLOOD PRESSURE: 80 MMHG | WEIGHT: 214.95 LBS | HEIGHT: 71 IN | BODY MASS INDEX: 30.09 KG/M2

## 2025-05-29 DIAGNOSIS — Z96.652 S/P TOTAL KNEE REPLACEMENT, LEFT: Primary | ICD-10-CM

## 2025-05-29 DIAGNOSIS — M70.52 PES ANSERINUS BURSITIS OF LEFT KNEE: ICD-10-CM

## 2025-05-29 NOTE — PROGRESS NOTES
Orthopaedic Clinic Note: Knee Established Patient    Chief Complaint   Patient presents with    Follow-up     9 month recheck- 1 year S/P Total Knee Arthroplasty With Lauro Robot - Left (DOS: 5/15/24)        HPI    It has been 9  month(s) since Mr. Santiago's last visit. He returns to clinic today for follow-up left total knee arthroplasty.  Patient a year out from surgery.  He rates his pain a 3/10 on the pain scale mainly due to some pain in the pes bursa area.  He is ambulating with no assistive device.  Denies fevers chills or constitutional symptoms.  Overall he is extremely happy with his outcome.    Past Medical History:   Diagnosis Date    Acute bronchitis     Acute pharyngitis     Arthritis     Cataract     Conjunctivitis     CTS (carpal tunnel syndrome)     Gout     Hyperlipidemia     Hypertension     IBS (irritable bowel syndrome)     Knee swelling     Lumbago     Myocardial infarction 2024    Rhinitis     Tear of meniscus of knee     Visual impairment       Past Surgical History:   Procedure Laterality Date    COLONOSCOPY      EYE SURGERY Bilateral     multiple on both eyes, RIGHT EYE DETACHED RETINA, LEFT EYE HAD EYELID SURGERY    HAND SURGERY  3/11/2022    BILATERAL CARPA TUNNEL RELEASE    HERNIA REPAIR Right     x2 INGUINAL    JOINT REPLACEMENT  5/15/2024    Left Knee Replacement    KNEE SURGERY Left     arthroscopy with meniscal repair- 10 years ago    TOTAL KNEE ARTHROPLASTY Left 05/15/2024    Procedure: TOTAL KNEE ARTHROPLASTY WITH LAURO ROBOT - LEFT;  Surgeon: Rolando Woodruff MD;  Location: [facility];  Service: Robotics - Ortho;  Laterality: Left;    VASECTOMY      WRIST SURGERY Left     CTR 3/11/22      Family History   Adopted: Yes     Social History     Socioeconomic History    Marital status:    Tobacco Use    Smoking status: Former     Current packs/day: 0.00     Types: Cigarettes     Quit date: 1983     Years since quittin.4    Smokeless tobacco: Never   Vaping Use     "Vaping status: Never Used   Substance and Sexual Activity    Alcohol use: Not Currently     Comment: occ    Drug use: No    Sexual activity: Not Currently     Partners: Female      Current Outpatient Medications on File Prior to Visit   Medication Sig Dispense Refill    allopurinol (ZYLOPRIM) 300 MG tablet TAKE 1 TABLET BY MOUTH DAILY 90 tablet 1    clopidogrel (PLAVIX) 75 MG tablet Take 1 tablet by mouth Daily.      Eliquis 5 MG tablet tablet Take 1 tablet by mouth Every 12 (Twelve) Hours. 60 tablet 2    ezetimibe (Zetia) 10 MG tablet Take 1 tablet by mouth Daily.      folic acid (FOLVITE) 1 MG tablet Take 1 tablet by mouth Daily. 90 tablet 1    furosemide (LASIX) 40 MG tablet Take 1 tablet by mouth Every Other Day. 45 tablet 1    metoprolol succinate XL (TOPROL-XL) 25 MG 24 hr tablet Take 1 tablet by mouth Daily.      ondansetron (Zofran) 4 MG tablet Take 1 tablet by mouth Every 8 (Eight) Hours As Needed for Nausea or Vomiting. 10 tablet 1    pantoprazole (PROTONIX) 40 MG EC tablet Take 1 tablet by mouth Daily.      prednisoLONE acetate (PRED FORTE) 1 % ophthalmic suspension       rosuvastatin (CRESTOR) 20 MG tablet Take 1 tablet by mouth Daily.      sacubitril-valsartan (Entresto) 49-51 MG tablet Take 1 tablet by mouth 2 (Two) Times a Day. 60 tablet 2    spironolactone (ALDACTONE) 25 MG tablet Take 1 tablet by mouth Daily.      traMADol (ULTRAM) 50 MG tablet Take 1 tablet by mouth Every 6 (Six) Hours As Needed for Moderate Pain.       No current facility-administered medications on file prior to visit.      No Known Allergies     Review of Systems     The patient's Review of Systems was personally reviewed and confirmed as accurate.    Physical Exam  Blood pressure 114/80, height 180.3 cm (70.98\"), weight 97.5 kg (214 lb 15.2 oz).    Body mass index is 29.99 kg/m².    GENERAL APPEARANCE: awake, alert, oriented, in no acute distress and well developed, well nourished  LUNGS:  breathing nonlabored  EXTREMITIES: no " clubbing, cyanosis  PERIPHERAL PULSES: palpable dorsalis pedis and posterior tibial pulses bilaterally.    GAIT:  Normal        ----------  Left Knee Exam:  ----------  ALIGNMENT: neutral  ----------  RANGE OF MOTION:  Normal (0-120 degrees) with no extensor lag or flexion contracture  LIGAMENTOUS STABILITY:   stable to varus and valgus stress at terminal extension and 30 degrees without any evidence of laxity  ----------  STRENGTH:  KNEE FLEXION 5/5  KNEE EXTENSION  5/5  ANKLE DORSIFLEXION  5/5  ANKLE PLANTARFLEXION  5/5  ----------  PAIN WITH PALPATION:medial joint line  KNEE EFFUSION: no  PAIN WITH KNEE ROM: no  PATELLAR CREPITUS:  no  ----------  SENSATION TO LIGHT TOUCH:  DEEP PERONEAL/SUPERFICIAL PERONEAL/SURAL/SAPHENOUS/TIBIAL:    intact  ----------  EDEMA:  no  ERYTHEMA:    no  WOUNDS/INCISIONS:   yes, well healed surgical incision without evidence of erythema or drainage  _____________________________________________________________________  _____________________________________________________________________    RADIOGRAPHIC FINDINGS:   Indication: Status post left total knee arthroplasty    Comparison: Todays xrays were compared to previous xrays from 8/27/2024    Knee films: Demonstrate well positioned knee arthroplasty components in satisfactory alignment without evidence of wear, loosening, subsidence, fracture, or osteolysis and No significant changes compared to prior radiographs.    Assessment/Plan:   Diagnosis Plan   1. S/P total knee replacement, left  XR Knee 3+ View With [place] Left      2. Pes anserinus bursitis of left knee          Patient doing well 1 year status post left total knee arthroplasty.  He does have some mild trace pes bursitis that is not limiting daily activities.  I offered a cortisone injection which he declined today.  I recommend activity as tolerated without restrictions.  I will see the patient back in 5 years for repeat assessment x-ray 3 views left knee on return.  He  is welcome follow-up sooner should problems arise.    I recommend prophylactic antibiotics prior to invasive procedures indefinitely to minimize risk of prosthetic joint infection.  Amoxicillin 2 g orally 1 hour prior to procedure is recommended.        Rolando Woodruff MD  05/29/25  13:49 EDT

## 2025-06-29 ENCOUNTER — PATIENT MESSAGE (OUTPATIENT)
Dept: FAMILY MEDICINE CLINIC | Facility: CLINIC | Age: 70
End: 2025-06-29
Payer: MEDICARE

## 2025-08-20 RX ORDER — FOLIC ACID 1 MG/1
1000 TABLET ORAL DAILY
Qty: 90 TABLET | Refills: 1 | Status: SHIPPED | OUTPATIENT
Start: 2025-08-20

## 2025-08-27 ENCOUNTER — OFFICE VISIT (OUTPATIENT)
Dept: FAMILY MEDICINE CLINIC | Facility: CLINIC | Age: 70
End: 2025-08-27
Payer: MEDICARE

## 2025-08-27 VITALS
OXYGEN SATURATION: 93 % | SYSTOLIC BLOOD PRESSURE: 130 MMHG | BODY MASS INDEX: 30.8 KG/M2 | RESPIRATION RATE: 16 BRPM | TEMPERATURE: 98.9 F | DIASTOLIC BLOOD PRESSURE: 90 MMHG | WEIGHT: 220 LBS | HEIGHT: 71 IN | HEART RATE: 76 BPM

## 2025-08-27 DIAGNOSIS — R68.89 FLU-LIKE SYMPTOMS: ICD-10-CM

## 2025-08-27 DIAGNOSIS — M77.12 LEFT LATERAL EPICONDYLITIS: ICD-10-CM

## 2025-08-27 DIAGNOSIS — J20.9 ACUTE BRONCHITIS, UNSPECIFIED ORGANISM: Primary | ICD-10-CM

## 2025-08-27 LAB
EXPIRATION DATE: NORMAL
FLUAV AG UPPER RESP QL IA.RAPID: NOT DETECTED
FLUBV AG UPPER RESP QL IA.RAPID: NOT DETECTED
INTERNAL CONTROL: NORMAL
Lab: NORMAL
SARS-COV-2 AG UPPER RESP QL IA.RAPID: NOT DETECTED

## 2025-08-27 PROCEDURE — 1126F AMNT PAIN NOTED NONE PRSNT: CPT | Performed by: FAMILY MEDICINE

## 2025-08-27 PROCEDURE — 99213 OFFICE O/P EST LOW 20 MIN: CPT | Performed by: FAMILY MEDICINE

## 2025-08-27 PROCEDURE — 3080F DIAST BP >= 90 MM HG: CPT | Performed by: FAMILY MEDICINE

## 2025-08-27 PROCEDURE — 1160F RVW MEDS BY RX/DR IN RCRD: CPT | Performed by: FAMILY MEDICINE

## 2025-08-27 PROCEDURE — 87428 SARSCOV & INF VIR A&B AG IA: CPT | Performed by: FAMILY MEDICINE

## 2025-08-27 PROCEDURE — 3075F SYST BP GE 130 - 139MM HG: CPT | Performed by: FAMILY MEDICINE

## 2025-08-27 PROCEDURE — 1159F MED LIST DOCD IN RCRD: CPT | Performed by: FAMILY MEDICINE

## 2025-08-27 RX ORDER — BROMPHENIRAMINE MALEATE, PSEUDOEPHEDRINE HYDROCHLORIDE, AND DEXTROMETHORPHAN HYDROBROMIDE 2; 30; 10 MG/5ML; MG/5ML; MG/5ML
5 SYRUP ORAL 4 TIMES DAILY PRN
Qty: 180 ML | Refills: 0 | Status: SHIPPED | OUTPATIENT
Start: 2025-08-27

## 2025-08-27 RX ORDER — AZITHROMYCIN 250 MG/1
TABLET, FILM COATED ORAL
Qty: 6 TABLET | Refills: 0 | Status: SHIPPED | OUTPATIENT
Start: 2025-08-27

## 2025-08-27 RX ORDER — PREDNISONE 10 MG/1
TABLET ORAL
Qty: 30 TABLET | Refills: 0 | Status: SHIPPED | OUTPATIENT
Start: 2025-08-27

## 2025-08-27 RX ORDER — DICLOFENAC SODIUM 75 MG/1
75 TABLET, DELAYED RELEASE ORAL 2 TIMES DAILY
Qty: 60 TABLET | Refills: 1 | Status: SHIPPED | OUTPATIENT
Start: 2025-08-27

## (undated) DEVICE — TB SXN FRAZIER 12F STRL

## (undated) DEVICE — SOL PVPI 10PCT 0.75OZ PEEL/PCH/PACKET 1P/U STRL

## (undated) DEVICE — BNDG ELAS W/CLIP 6IN 10YD LF STRL

## (undated) DEVICE — GLV SURG SENSICARE PI MIC PF SZ9 LF STRL

## (undated) DEVICE — UNDERCAST PADDING: Brand: DEROYAL

## (undated) DEVICE — TAPE,CLOTH/SILK,CURAD,3"X10YD,LF,40/CS: Brand: CURAD

## (undated) DEVICE — UNDERGLV SURG BIOGEL INDICAT PI SZ8 BLU

## (undated) DEVICE — TRAP FLD MINIVAC MEGADYNE 100ML

## (undated) DEVICE — ANTIBACTERIAL UNDYED BRAIDED (POLYGLACTIN 910), SYNTHETIC ABSORBABLE SUTURE: Brand: COATED VICRYL

## (undated) DEVICE — BNDG ELAS CO-FLEX SLF ADHR 6IN 5YD LF STRL

## (undated) DEVICE — BLAD SAGITTAL MAKO STD

## (undated) DEVICE — BLANKT WARM UPPR/BDY ARM/OUT 57X196CM

## (undated) DEVICE — SYR LUERLOK 30CC

## (undated) DEVICE — DRSNG SURG AQUACEL AG/ADVNTGE 9X25CM 3.5X10IN

## (undated) DEVICE — ADHS SKIN PREMIERPRO EXOFIN TOPICAL HI/VISC .5ML

## (undated) DEVICE — PAD,ARMBOARD,CONV,FOAM,2X8X20",12PR/CS: Brand: MEDLINE

## (undated) DEVICE — NEEDLE, QUINCKE 22GX3.5": Brand: MEDLINE INDUSTRIES, INC.

## (undated) DEVICE — DRSNG GZ CURAD XEROFORM PETROLTM OVERWRP 1X8IN STRL

## (undated) DEVICE — DRSNG WND BORDR/ADHS NONADHR/GZ LF 4X4IN STRL

## (undated) DEVICE — KT TRAK CHECKPOINT 1FEM/1TIB MAKO SS 1P/U STRL

## (undated) DEVICE — TRY EPID SFTY 18G 3.5IN 1T7680

## (undated) DEVICE — KT PROC KN MAKO VIZADISC TRACK 1P/U STRL

## (undated) DEVICE — DRESSING,GAUZE,XEROFORM,CURAD,1"X8",ST: Brand: CURAD

## (undated) DEVICE — GLV SURG SENSICARE PI ORTHO SZ8 LF STRL

## (undated) DEVICE — PATIENT RETURN ELECTRODE, SINGLE-USE, CONTACT QUALITY MONITORING, ADULT, WITH 9FT CORD, FOR PATIENTS WEIGING OVER 33LBS. (15KG): Brand: MEGADYNE

## (undated) DEVICE — SUT VIC 1 CTX 36IN OBGYN VCP977H

## (undated) DEVICE — STRYKER PERFORMANCE SERIES SAGITTAL BLADE: Brand: STRYKER PERFORMANCE SERIES

## (undated) DEVICE — Device

## (undated) DEVICE — PIN BONE MAKO PT 4X110MM SS 1P/U STRL

## (undated) DEVICE — CATHETER,URETHRAL,REDRUBBER,STERILE,22FR: Brand: MEDLINE

## (undated) DEVICE — KT PUMP INFUBLOCK MDL 2100 PMKITSOLIS

## (undated) DEVICE — PIN BONE MAKO PT 4X140MM SS 1P/U STRL

## (undated) DEVICE — PK KN TOTL 10

## (undated) DEVICE — DISPOSABLE TOURNIQUET CUFF SINGLE BLADDER, DUAL PORT AND QUICK CONNECT CONNECTOR: Brand: COLOR CUFF

## (undated) DEVICE — SUT MONOCRYL PLS ANTIB UND 3/0  PS1 27IN